# Patient Record
Sex: FEMALE | Race: WHITE | NOT HISPANIC OR LATINO | Employment: OTHER | URBAN - METROPOLITAN AREA
[De-identification: names, ages, dates, MRNs, and addresses within clinical notes are randomized per-mention and may not be internally consistent; named-entity substitution may affect disease eponyms.]

---

## 2017-01-03 ENCOUNTER — OFFICE VISIT (OUTPATIENT)
Dept: INTERNAL MEDICINE | Facility: CLINIC | Age: 62
End: 2017-01-03

## 2017-01-03 VITALS
SYSTOLIC BLOOD PRESSURE: 130 MMHG | WEIGHT: 165.9 LBS | OXYGEN SATURATION: 98 % | HEART RATE: 79 BPM | TEMPERATURE: 98 F | DIASTOLIC BLOOD PRESSURE: 90 MMHG | BODY MASS INDEX: 26.98 KG/M2

## 2017-01-03 DIAGNOSIS — J06.9 URI WITH COUGH AND CONGESTION: Primary | ICD-10-CM

## 2017-01-03 LAB
EXPIRATION DATE: NORMAL
INTERNAL CONTROL: NORMAL
Lab: NORMAL
S PYO AG THROAT QL: NEGATIVE

## 2017-01-03 PROCEDURE — 99213 OFFICE O/P EST LOW 20 MIN: CPT | Performed by: INTERNAL MEDICINE

## 2017-01-03 PROCEDURE — 87880 STREP A ASSAY W/OPTIC: CPT | Performed by: INTERNAL MEDICINE

## 2017-01-03 NOTE — PROGRESS NOTES
Subjective   Kelly Strickland is a 61 y.o. female.   Chief Complaint   Patient presents with   • URI       History of Present Illness   Seen on [id number] by PA and given zpak. Cough, nonproductive. No fever or chills. No nausea or vomiting. No diarrhea. Sore throat x 3 days.  The following portions of the patient's history were reviewed and updated as appropriate: allergies, current medications, past family history, past medical history, past social history, past surgical history and problem list.    Review of Systems   Constitutional: Negative for activity change, appetite change, chills, diaphoresis, fatigue, fever and unexpected weight change.   HENT: Positive for sore throat. Negative for congestion, ear discharge, ear pain, mouth sores, nosebleeds, sinus pressure and sneezing.    Eyes: Negative for pain, discharge and itching.   Respiratory: Positive for cough. Negative for chest tightness, shortness of breath and wheezing.    Cardiovascular: Negative for chest pain, palpitations and leg swelling.   Gastrointestinal: Negative for abdominal pain, constipation, diarrhea, nausea and vomiting.   Endocrine: Negative for cold intolerance, heat intolerance, polydipsia and polyphagia.   Genitourinary: Negative for dysuria, flank pain, frequency, hematuria and urgency.   Musculoskeletal: Negative for arthralgias, back pain, gait problem, myalgias, neck pain and neck stiffness.   Skin: Negative for color change, pallor and rash.   Neurological: Negative for seizures, speech difficulty, numbness and headaches.   Psychiatric/Behavioral: Negative for agitation, confusion, decreased concentration and sleep disturbance. The patient is not nervous/anxious.      Visit Vitals   • /90   • Pulse 79   • Temp 98 °F (36.7 °C)   • Wt 165 lb 14.4 oz (75.3 kg)   • SpO2 98%   • BMI 26.98 kg/m2       Objective   Physical Exam   Constitutional: She is oriented to person, place, and time. She appears well-developed.   HENT:   Head:  Normocephalic.   Right Ear: External ear normal.   Left Ear: External ear normal.   Nose: Nose normal.   Mouth/Throat: Oropharynx is clear and moist.   Eyes: Conjunctivae are normal. Pupils are equal, round, and reactive to light.   Neck: No JVD present. No thyromegaly present.   Cardiovascular: Normal rate, regular rhythm and normal heart sounds.  Exam reveals no friction rub.    No murmur heard.  Pulmonary/Chest: Effort normal and breath sounds normal. No respiratory distress. She has no wheezes. She has no rales.   Abdominal: Soft. Bowel sounds are normal. She exhibits no distension. There is no tenderness. There is no guarding.   Musculoskeletal: She exhibits no edema or tenderness.   Lymphadenopathy:     She has no cervical adenopathy.   Neurological: She is alert and oriented to person, place, and time. She has normal reflexes. She displays normal reflexes. No cranial nerve deficit.   Skin: No rash noted.   Psychiatric: She has a normal mood and affect. Her behavior is normal.   Nursing note and vitals reviewed.      Assessment/Plan   Kelly was seen today for uri.    Diagnoses and all orders for this visit:    URI with cough and congestion  -     POC Rapid Strep A    strep neg. Viral. Can use otc products for relief. Notify if sxs worsen or fever develops.

## 2017-01-03 NOTE — MR AVS SNAPSHOT
Kelly Strickland   1/3/2017 8:45 AM   Office Visit    Dept Phone:  [phone]   Encounter #:  [id number]    Provider:  Mary Antonio DO   Department:  [facility] INTERNAL MEDICINE AND ENDOCRINOLOGY [place]                Your Full Care Plan              Today's Medication Changes          These changes are accurate as of: 1/3/17  9:39 AM.  If you have any questions, ask your nurse or doctor.               Stop taking medication(s)listed here:     azithromycin 250 MG tablet   Commonly known as:  ZITHROMAX Z-MALICK   Stopped by:  Mary Antonio DO                      Your Updated Medication List          This list is accurate as of: 1/3/17  9:39 AM.  Always use your most recent med list.                ibandronate 150 MG tablet   Commonly known as:  BONIVA   Take 1 tablet by mouth every 30 (thirty) days.               We Performed the Following     POC Rapid Strep A       You Were Diagnosed With        Codes Comments    URI with cough and congestion    -  Primary ICD-10-CM: J06.9  ICD-9-CM: 465.9       Instructions     None    Patient Instructions History      Upcoming Appointments     Visit Type Date Time Department    FOLLOW UP 1/3/2017  8:45 AM [facility]    PHYSICAL 2017  9:00 AM [facility]      [employer] Signup     [facility] [employer] allows you to send messages to your doctor, view your test results, renew your prescriptions, schedule appointments, and more. To sign up, go to [employer] and click on the Sign Up Now link in the New User? box. Enter your [employer] Activation Code exactly as it appears below along with the last four digits of your Social Security Number and your Date of Birth () to complete the sign-up process. If you do not sign up before the expiration date, you must request a new code.    [employer] Activation Code: [email or web address]  Expires: 2017  5:37 AM    If you have questions, you can email [email or web address] or  call [phone] to talk to our MyChart staff. Remember, "[employer]hart is NOT to be used for urgent needs. For medical emergencies, dial 911.               Other Info from Your Visit           Your Appointments     Jun 29, 2017  9:00 AM EDT   Physical with Mary Antonio DO   [facility] INTERNAL MEDICINE AND ENDOCRINOLOGY [place] (--)    [address] [address] [phone]           Arrive 15 minutes prior to appointment.              Allergies     Other        Reason for Visit     URI           Vital Signs     Blood Pressure Pulse Temperature Weight Oxygen Saturation Body Mass Index    130/90 79 98 °F (36.7 °C) 165 lb 14.4 oz (75.3 kg) 98% 26.98 kg/m2    Smoking Status                   Never Smoker           Problems and Diagnoses Noted     URI with cough and congestion    -  Primary

## 2017-01-31 ENCOUNTER — OFFICE VISIT (OUTPATIENT)
Dept: INTERNAL MEDICINE | Facility: CLINIC | Age: 62
End: 2017-01-31

## 2017-01-31 VITALS
BODY MASS INDEX: 26.51 KG/M2 | HEART RATE: 81 BPM | OXYGEN SATURATION: 99 % | WEIGHT: 163 LBS | SYSTOLIC BLOOD PRESSURE: 130 MMHG | DIASTOLIC BLOOD PRESSURE: 100 MMHG

## 2017-01-31 DIAGNOSIS — F41.1 ANXIETY AS ACUTE REACTION TO EXCEPTIONAL STRESS: Primary | ICD-10-CM

## 2017-01-31 DIAGNOSIS — Z11.59 NEED FOR HEPATITIS C SCREENING TEST: ICD-10-CM

## 2017-01-31 DIAGNOSIS — F43.0 ANXIETY AS ACUTE REACTION TO EXCEPTIONAL STRESS: Primary | ICD-10-CM

## 2017-01-31 LAB — HCV AB SER DONR QL: NORMAL

## 2017-01-31 PROCEDURE — 86803 HEPATITIS C AB TEST: CPT | Performed by: INTERNAL MEDICINE

## 2017-01-31 PROCEDURE — 36415 COLL VENOUS BLD VENIPUNCTURE: CPT | Performed by: INTERNAL MEDICINE

## 2017-01-31 PROCEDURE — 99213 OFFICE O/P EST LOW 20 MIN: CPT | Performed by: INTERNAL MEDICINE

## 2017-01-31 NOTE — PROGRESS NOTES
Subjective   Kelly Strickland is a 61 y.o. female.   Chief Complaint   Patient presents with   • Stress       History of Present Illness   Has a lot of stress right now. Daughter with her 18 m are living with her. Has not taken meds in past. SXS have been present last 3 months.   The following portions of the patient's history were reviewed and updated as appropriate: allergies, current medications, past family history, past medical history, past social history, past surgical history and problem list.    Review of Systems   Constitutional: Negative for activity change, appetite change, chills, diaphoresis, fatigue, fever and unexpected weight change.   HENT: Negative for congestion, ear discharge, ear pain, mouth sores, nosebleeds, sinus pressure, sneezing and sore throat.    Eyes: Negative for pain, discharge and itching.   Respiratory: Negative for cough, chest tightness, shortness of breath and wheezing.    Cardiovascular: Negative for chest pain, palpitations and leg swelling.   Gastrointestinal: Negative for abdominal pain, constipation, diarrhea, nausea and vomiting.   Endocrine: Negative for cold intolerance, heat intolerance, polydipsia and polyphagia.   Genitourinary: Negative for dysuria, flank pain, frequency, hematuria and urgency.   Musculoskeletal: Negative for arthralgias, back pain, gait problem, myalgias, neck pain and neck stiffness.   Skin: Negative for color change, pallor and rash.   Neurological: Negative for seizures, speech difficulty, numbness and headaches.   Psychiatric/Behavioral: Negative for agitation, confusion, decreased concentration and sleep disturbance. The patient is not nervous/anxious.         Stress     Visit Vitals   • /100   • Pulse 81   • Wt 163 lb (73.9 kg)   • SpO2 99%   • BMI 26.51 kg/m2       Objective   Physical Exam   Constitutional: She is oriented to person, place, and time. She appears well-developed.   HENT:   Head: Normocephalic.   Right Ear: External ear  normal.   Left Ear: External ear normal.   Nose: Nose normal.   Mouth/Throat: Oropharynx is clear and moist.   Eyes: Conjunctivae are normal. Pupils are equal, round, and reactive to light.   Neck: No JVD present. No thyromegaly present.   Cardiovascular: Normal rate, regular rhythm and normal heart sounds.  Exam reveals no friction rub.    No murmur heard.  Pulmonary/Chest: Effort normal and breath sounds normal. No respiratory distress. She has no wheezes. She has no rales.   Abdominal: Soft. Bowel sounds are normal. She exhibits no distension. There is no tenderness. There is no guarding.   Musculoskeletal: She exhibits no edema or tenderness.   Lymphadenopathy:     She has no cervical adenopathy.   Neurological: She is alert and oriented to person, place, and time. She has normal reflexes. She displays normal reflexes. No cranial nerve deficit.   Skin: No rash noted.   Psychiatric: She has a normal mood and affect. Her behavior is normal.   Nursing note and vitals reviewed.      Assessment/Plan   Kelly was seen today for stress.    Diagnoses and all orders for this visit:    Anxiety as acute reaction to exceptional stress  -     sertraline (ZOLOFT) 50 MG tablet; Take 1 tablet by mouth Daily.  New script  Will take 1/2 x 1 week.  Need for hepatitis C screening test  -     Hepatitis C Antibody

## 2017-01-31 NOTE — MR AVS SNAPSHOT
Kelly Strickland   1/31/2017 7:30 AM   Office Visit    Dept Phone:  [phone]   Encounter #:  [id number]    Provider:  Mary Antonio DO   Department:  [facility] INTERNAL MEDICINE AND ENDOCRINOLOGY [place]                Your Full Care Plan              Today's Medication Changes          These changes are accurate as of: 1/31/17  7:56 AM.  If you have any questions, ask your nurse or doctor.               New Medication(s)Ordered:     sertraline 50 MG tablet   Commonly known as:  ZOLOFT   Take 1 tablet by mouth Daily.            Where to Get Your Medications      These medications were sent to [facility]/pharmacy #[id number] - [place], [place] - [address] - [phone]  - [phone] [place] [address]    Hours:  24-hours Phone:  [phone]     sertraline 50 MG tablet                  Your Updated Medication List          This list is accurate as of: 1/31/17  7:56 AM.  Always use your most recent med list.                ibandronate 150 MG tablet   Commonly known as:  BONIVA   Take 1 tablet by mouth every 30 (thirty) days.       sertraline 50 MG tablet   Commonly known as:  ZOLOFT   Take 1 tablet by mouth Daily.               We Performed the Following     Hepatitis C Antibody       You Were Diagnosed With        Codes Comments    Anxiety as acute reaction to exceptional stress    -  Primary ICD-10-CM: F41.1, F43.0  ICD-9-CM: 308.0     Need for hepatitis C screening test     ICD-10-CM: Z11.59  ICD-9-CM: V73.89       Instructions     None    Patient Instructions History      Upcoming Appointments     Visit Type Date Time Department    SAME DAY 1/31/2017  7:30 AM [facility]    FOLLOW UP 2/21/2017  7:30 AM [facility]    PHYSICAL 6/29/2017  9:00 AM [facility]      MyChart Signup     [facility] [employer] [facility] allows you to send messages to your doctor, view your test results, renew your prescriptions, schedule appointments, and more. To sign up, go  to [email or web address] and click on the Sign Up Now link in the New User? box. Enter your [employer] Activation Code exactly as it appears below along with the last four digits of your Social Security Number and your Date of Birth () to complete the sign-up process. If you do not sign up before the expiration date, you must request a new code.    [employer] Activation Code: [email or web address]  Expires: 2017  5:38 AM    If you have questions, you can email [email or web address] or call [phone] to talk to our [employer] staff. Remember, [employer] is NOT to be used for urgent needs. For medical emergencies, dial 911.               Other Info from Your Visit           Your Appointments     2017  7:30 AM EST   Follow Up with Mary Antonio DO   [facility] INTERNAL MEDICINE AND ENDOCRINOLOGY [place] (--)    [address]  [facility] [address]   [phone]           Arrive 15 minutes prior to appointment.            2017  9:00 AM EDT   Physical with Mary Antonio DO   [facility] INTERNAL MEDICINE AND ENDOCRINOLOGY [place] (--)    [address]  [facility] [address]   [phone]           Arrive 15 minutes prior to appointment.              Allergies     Other        Reason for Visit     Stress           Vital Signs     Blood Pressure Pulse Weight Oxygen Saturation Body Mass Index Smoking Status    130/100 81 163 lb (73.9 kg) 99% 26.51 kg/m2 Never Smoker      Problems and Diagnoses Noted     Anxiety as acute reaction to exceptional stress    -  Primary    Need for hepatitis C screening test          Results

## 2017-02-13 ENCOUNTER — TELEPHONE (OUTPATIENT)
Dept: INTERNAL MEDICINE | Facility: CLINIC | Age: 62
End: 2017-02-13

## 2017-02-13 RX ORDER — OSELTAMIVIR PHOSPHATE 75 MG/1
75 CAPSULE ORAL DAILY
Qty: 10 CAPSULE | Refills: 0 | Status: SHIPPED | OUTPATIENT
Start: 2017-02-13 | End: 2017-03-10

## 2017-02-13 NOTE — TELEPHONE ENCOUNTER
----- Message from Mary Antonio DO sent at 2/13/2017  2:43 PM EST -----  Contact: PATIENT   tamiflu 75 mg once a day x 10 days (let her know this is prophylaxis dose)  ----- Message -----     From: Tran LIVINGSTON MA     Sent: 2/13/2017   1:44 PM       To: Mary Antonio DO    Pls advise.     ----- Message -----     From: Charo Jain     Sent: 2/13/2017   8:50 AM       To: Myrna Voss MA    RE: TAMIFLU    MS CUEVAS STATES THAT BOTH HER DAUGHTER AND GRANDDAUGHTER HAVE TESTED POSITIVE FOR THE FLU. MS CUEVAS WOULD LIKE TO KNOW IF IT WOULD BE POSSIBLE FOR DR ANTONIO TO WRITE AN ORDER FOR TAMIFLU FOR HER AS A PREVENTATIVE. SHE HAS NO SYMPTOMS AT THIS TIME BUT IS CARING FOR THE FAMILY MEMBERS THAT ARE CURRENTLY ILL.    [facility] ON [address]    CALL BACK #[phone]

## 2017-02-15 PROBLEM — R09.89 CAROTID BRUIT: Status: ACTIVE | Noted: 2017-02-15

## 2017-02-21 ENCOUNTER — OFFICE VISIT (OUTPATIENT)
Dept: INTERNAL MEDICINE | Facility: CLINIC | Age: 62
End: 2017-02-21

## 2017-02-21 VITALS
SYSTOLIC BLOOD PRESSURE: 110 MMHG | WEIGHT: 159.2 LBS | BODY MASS INDEX: 25.89 KG/M2 | HEART RATE: 77 BPM | OXYGEN SATURATION: 99 % | DIASTOLIC BLOOD PRESSURE: 80 MMHG

## 2017-02-21 DIAGNOSIS — F41.9 ANXIETY: Primary | ICD-10-CM

## 2017-02-21 PROCEDURE — 99213 OFFICE O/P EST LOW 20 MIN: CPT | Performed by: INTERNAL MEDICINE

## 2017-02-21 NOTE — PROGRESS NOTES
Subjective   Kelly Strickland is a 62 y.o. female.   Chief Complaint   Patient presents with   • Anxiety       History of Present Illness   F/u on recent diagnosis of anxiety. Started on Zoloft and seems to be working. Takes it at night because it makes her sleepy.  The following portions of the patient's history were reviewed and updated as appropriate: allergies, current medications, past family history, past medical history, past social history, past surgical history and problem list.    Review of Systems   Constitutional: Negative for activity change, appetite change, chills, diaphoresis, fatigue, fever and unexpected weight change.   HENT: Negative for congestion, ear discharge, ear pain, mouth sores, nosebleeds, sinus pressure, sneezing and sore throat.    Eyes: Negative for pain, discharge and itching.   Respiratory: Negative for cough, chest tightness, shortness of breath and wheezing.    Cardiovascular: Negative for chest pain, palpitations and leg swelling.   Gastrointestinal: Negative for abdominal pain, constipation, diarrhea, nausea and vomiting.   Endocrine: Negative for cold intolerance, heat intolerance, polydipsia and polyphagia.   Genitourinary: Negative for dysuria, flank pain, frequency, hematuria and urgency.   Musculoskeletal: Negative for arthralgias, back pain, gait problem, myalgias, neck pain and neck stiffness.   Skin: Negative for color change, pallor and rash.   Neurological: Negative for seizures, speech difficulty, numbness and headaches.   Psychiatric/Behavioral: Negative for agitation, confusion, decreased concentration and sleep disturbance. The patient is not nervous/anxious.      Visit Vitals   • /80   • Pulse 77   • Wt 159 lb 3.2 oz (72.2 kg)   • SpO2 99%   • BMI 25.89 kg/m2       Objective   Physical Exam   Constitutional: She is oriented to person, place, and time. She appears well-developed.   HENT:   Head: Normocephalic.   Right Ear: External ear normal.   Left Ear:  External ear normal.   Nose: Nose normal.   Mouth/Throat: Oropharynx is clear and moist.   Eyes: Conjunctivae are normal. Pupils are equal, round, and reactive to light.   Neck: No JVD present. No thyromegaly present.   Cardiovascular: Normal rate, regular rhythm and normal heart sounds.  Exam reveals no friction rub.    No murmur heard.  Pulmonary/Chest: Effort normal and breath sounds normal. No respiratory distress. She has no wheezes. She has no rales.   Abdominal: Soft. Bowel sounds are normal. She exhibits no distension. There is no tenderness. There is no guarding.   Musculoskeletal: She exhibits no edema or tenderness.   Lymphadenopathy:     She has no cervical adenopathy.   Neurological: She is alert and oriented to person, place, and time. She has normal reflexes. She displays normal reflexes. No cranial nerve deficit.   Skin: No rash noted.   Psychiatric: She has a normal mood and affect. Her behavior is normal.   Nursing note and vitals reviewed.      Assessment/Plan   Kelly was seen today for anxiety.    Diagnoses and all orders for this visit:    Anxiety  Zoloft is helping. Continue current dose.

## 2017-03-10 ENCOUNTER — APPOINTMENT (OUTPATIENT)
Dept: GENERAL RADIOLOGY | Facility: HOSPITAL | Age: 62
End: 2017-03-10

## 2017-03-10 ENCOUNTER — HOSPITAL ENCOUNTER (OUTPATIENT)
Facility: HOSPITAL | Age: 62
Setting detail: OBSERVATION
LOS: 1 days | Discharge: HOME | End: 2017-03-14
Attending: EMERGENCY MEDICINE | Admitting: INTERNAL MEDICINE

## 2017-03-10 ENCOUNTER — HOSPITAL ENCOUNTER (OUTPATIENT)
Facility: HOSPITAL | Age: 62
Setting detail: SURGERY ADMIT
End: 2017-03-10
Attending: ORTHOPAEDIC SURGERY | Admitting: ORTHOPAEDIC SURGERY

## 2017-03-10 DIAGNOSIS — Z74.09 IMPAIRED MOBILITY AND ADLS: ICD-10-CM

## 2017-03-10 DIAGNOSIS — Z74.09 IMPAIRED FUNCTIONAL MOBILITY, BALANCE, GAIT, AND ENDURANCE: ICD-10-CM

## 2017-03-10 DIAGNOSIS — S42.291A OTHER CLOSED DISPLACED FRACTURE OF PROXIMAL END OF RIGHT HUMERUS, INITIAL ENCOUNTER: Primary | ICD-10-CM

## 2017-03-10 DIAGNOSIS — Z78.9 IMPAIRED MOBILITY AND ADLS: ICD-10-CM

## 2017-03-10 PROBLEM — M85.80 OSTEOPENIA: Status: ACTIVE | Noted: 2017-03-10

## 2017-03-10 PROBLEM — K21.9 GERD (GASTROESOPHAGEAL REFLUX DISEASE): Status: ACTIVE | Noted: 2017-03-10

## 2017-03-10 PROBLEM — S42.309A CLOSED FRACTURE OF HUMERUS: Status: ACTIVE | Noted: 2017-03-10

## 2017-03-10 LAB
ANION GAP SERPL CALCULATED.3IONS-SCNC: 2 MMOL/L (ref 3–11)
BASOPHILS # BLD AUTO: 0.01 10*3/MM3 (ref 0–0.2)
BASOPHILS NFR BLD AUTO: 0.1 % (ref 0–1)
BUN BLD-MCNC: 12 MG/DL (ref 9–23)
BUN/CREAT SERPL: 17.1 (ref 7–25)
CALCIUM SPEC-SCNC: 9 MG/DL (ref 8.7–10.4)
CHLORIDE SERPL-SCNC: 110 MMOL/L (ref 99–109)
CO2 SERPL-SCNC: 29 MMOL/L (ref 20–31)
CREAT BLD-MCNC: 0.7 MG/DL (ref 0.6–1.3)
DEPRECATED RDW RBC AUTO: 44.9 FL (ref 37–54)
EOSINOPHIL # BLD AUTO: 0.06 10*3/MM3 (ref 0.1–0.3)
EOSINOPHIL NFR BLD AUTO: 0.5 % (ref 0–3)
ERYTHROCYTE [DISTWIDTH] IN BLOOD BY AUTOMATED COUNT: 13.1 % (ref 11.3–14.5)
GFR SERPL CREATININE-BSD FRML MDRD: 85 ML/MIN/1.73
GLUCOSE BLD-MCNC: 103 MG/DL (ref 70–100)
HCT VFR BLD AUTO: 42.1 % (ref 34.5–44)
HGB BLD-MCNC: 14 G/DL (ref 11.5–15.5)
HOLD SPECIMEN: NORMAL
HOLD SPECIMEN: NORMAL
IMM GRANULOCYTES # BLD: 0.04 10*3/MM3 (ref 0–0.03)
IMM GRANULOCYTES NFR BLD: 0.3 % (ref 0–0.6)
LYMPHOCYTES # BLD AUTO: 1.07 10*3/MM3 (ref 0.6–4.8)
LYMPHOCYTES NFR BLD AUTO: 8.8 % (ref 24–44)
MCH RBC QN AUTO: 31 PG (ref 27–31)
MCHC RBC AUTO-ENTMCNC: 33.3 G/DL (ref 32–36)
MCV RBC AUTO: 93.1 FL (ref 80–99)
MONOCYTES # BLD AUTO: 0.59 10*3/MM3 (ref 0–1)
MONOCYTES NFR BLD AUTO: 4.8 % (ref 0–12)
NEUTROPHILS # BLD AUTO: 10.42 10*3/MM3 (ref 1.5–8.3)
NEUTROPHILS NFR BLD AUTO: 85.5 % (ref 41–71)
PLATELET # BLD AUTO: 276 10*3/MM3 (ref 150–450)
PMV BLD AUTO: 9.5 FL (ref 6–12)
POTASSIUM BLD-SCNC: 4.3 MMOL/L (ref 3.5–5.5)
RBC # BLD AUTO: 4.52 10*6/MM3 (ref 3.89–5.14)
SODIUM BLD-SCNC: 141 MMOL/L (ref 132–146)
WBC NRBC COR # BLD: 12.19 10*3/MM3 (ref 3.5–10.8)
WHOLE BLOOD HOLD SPECIMEN: NORMAL
WHOLE BLOOD HOLD SPECIMEN: NORMAL

## 2017-03-10 PROCEDURE — 25010000002 HYDROMORPHONE PER 4 MG: Performed by: EMERGENCY MEDICINE

## 2017-03-10 PROCEDURE — 99284 EMERGENCY DEPT VISIT MOD MDM: CPT

## 2017-03-10 PROCEDURE — 25010000002 LORAZEPAM PER 2 MG: Performed by: EMERGENCY MEDICINE

## 2017-03-10 PROCEDURE — 93005 ELECTROCARDIOGRAM TRACING: CPT | Performed by: PHYSICIAN ASSISTANT

## 2017-03-10 PROCEDURE — 25010000002 MORPHINE PER 10 MG: Performed by: EMERGENCY MEDICINE

## 2017-03-10 PROCEDURE — 85025 COMPLETE CBC W/AUTO DIFF WBC: CPT | Performed by: PHYSICIAN ASSISTANT

## 2017-03-10 PROCEDURE — 96376 TX/PRO/DX INJ SAME DRUG ADON: CPT

## 2017-03-10 PROCEDURE — 73060 X-RAY EXAM OF HUMERUS: CPT

## 2017-03-10 PROCEDURE — 80048 BASIC METABOLIC PNL TOTAL CA: CPT | Performed by: PHYSICIAN ASSISTANT

## 2017-03-10 PROCEDURE — 25010000002 ONDANSETRON PER 1 MG: Performed by: EMERGENCY MEDICINE

## 2017-03-10 PROCEDURE — 25010000002 HYDROMORPHONE PER 4 MG

## 2017-03-10 PROCEDURE — 71010 HC CHEST PA OR AP: CPT

## 2017-03-10 PROCEDURE — G0378 HOSPITAL OBSERVATION PER HR: HCPCS

## 2017-03-10 PROCEDURE — 25010000002 HYDROMORPHONE PER 4 MG: Performed by: INTERNAL MEDICINE

## 2017-03-10 RX ORDER — LORAZEPAM 2 MG/ML
0.5 INJECTION INTRAMUSCULAR ONCE
Status: COMPLETED | OUTPATIENT
Start: 2017-03-10 | End: 2017-03-10

## 2017-03-10 RX ORDER — SODIUM CHLORIDE 0.9 % (FLUSH) 0.9 %
1-10 SYRINGE (ML) INJECTION AS NEEDED
Status: DISCONTINUED | OUTPATIENT
Start: 2017-03-10 | End: 2017-03-14 | Stop reason: HOSPADM

## 2017-03-10 RX ORDER — HYDROMORPHONE HYDROCHLORIDE 1 MG/ML
0.5 INJECTION, SOLUTION INTRAMUSCULAR; INTRAVENOUS; SUBCUTANEOUS ONCE
Status: COMPLETED | OUTPATIENT
Start: 2017-03-10 | End: 2017-03-10

## 2017-03-10 RX ORDER — MORPHINE SULFATE 4 MG/ML
4 INJECTION, SOLUTION INTRAMUSCULAR; INTRAVENOUS ONCE
Status: COMPLETED | OUTPATIENT
Start: 2017-03-10 | End: 2017-03-10

## 2017-03-10 RX ORDER — ONDANSETRON 2 MG/ML
4 INJECTION INTRAMUSCULAR; INTRAVENOUS ONCE
Status: COMPLETED | OUTPATIENT
Start: 2017-03-10 | End: 2017-03-10

## 2017-03-10 RX ORDER — HYDROMORPHONE HYDROCHLORIDE 1 MG/ML
0.5 INJECTION, SOLUTION INTRAMUSCULAR; INTRAVENOUS; SUBCUTANEOUS
Status: DISCONTINUED | OUTPATIENT
Start: 2017-03-10 | End: 2017-03-14

## 2017-03-10 RX ORDER — HYDROCODONE BITARTRATE AND ACETAMINOPHEN 5; 325 MG/1; MG/1
1 TABLET ORAL EVERY 4 HOURS PRN
Status: DISCONTINUED | OUTPATIENT
Start: 2017-03-10 | End: 2017-03-14 | Stop reason: HOSPADM

## 2017-03-10 RX ORDER — ONDANSETRON 2 MG/ML
4 INJECTION INTRAMUSCULAR; INTRAVENOUS EVERY 6 HOURS PRN
Status: DISCONTINUED | OUTPATIENT
Start: 2017-03-10 | End: 2017-03-14 | Stop reason: HOSPADM

## 2017-03-10 RX ORDER — HYDRALAZINE HYDROCHLORIDE 20 MG/ML
10 INJECTION INTRAMUSCULAR; INTRAVENOUS EVERY 6 HOURS PRN
Status: DISCONTINUED | OUTPATIENT
Start: 2017-03-10 | End: 2017-03-14 | Stop reason: HOSPADM

## 2017-03-10 RX ORDER — IBUPROFEN 200 MG
200 TABLET ORAL EVERY 6 HOURS PRN
COMMUNITY
End: 2017-06-29

## 2017-03-10 RX ORDER — DOCUSATE SODIUM 100 MG/1
100 CAPSULE, LIQUID FILLED ORAL 2 TIMES DAILY
Status: DISCONTINUED | OUTPATIENT
Start: 2017-03-10 | End: 2017-03-14 | Stop reason: HOSPADM

## 2017-03-10 RX ADMIN — LORAZEPAM 0.5 MG: 2 INJECTION INTRAMUSCULAR; INTRAVENOUS at 11:45

## 2017-03-10 RX ADMIN — HYDROMORPHONE HYDROCHLORIDE 1 MG: 1 INJECTION, SOLUTION INTRAMUSCULAR; INTRAVENOUS; SUBCUTANEOUS at 08:37

## 2017-03-10 RX ADMIN — DOCUSATE SODIUM 100 MG: 100 CAPSULE, LIQUID FILLED ORAL at 17:27

## 2017-03-10 RX ADMIN — LORAZEPAM 0.5 MG: 2 INJECTION INTRAMUSCULAR; INTRAVENOUS at 10:21

## 2017-03-10 RX ADMIN — MORPHINE SULFATE 4 MG: 4 INJECTION, SOLUTION INTRAMUSCULAR; INTRAVENOUS at 08:20

## 2017-03-10 RX ADMIN — HYDROMORPHONE HYDROCHLORIDE 0.5 MG: 1 INJECTION, SOLUTION INTRAMUSCULAR; INTRAVENOUS; SUBCUTANEOUS at 11:44

## 2017-03-10 RX ADMIN — HYDROMORPHONE HYDROCHLORIDE 0.5 MG: 1 INJECTION, SOLUTION INTRAMUSCULAR; INTRAVENOUS; SUBCUTANEOUS at 19:27

## 2017-03-10 RX ADMIN — HYDROMORPHONE HYDROCHLORIDE 0.5 MG: 1 INJECTION, SOLUTION INTRAMUSCULAR; INTRAVENOUS; SUBCUTANEOUS at 22:02

## 2017-03-10 RX ADMIN — ONDANSETRON 4 MG: 2 INJECTION INTRAMUSCULAR; INTRAVENOUS at 10:22

## 2017-03-10 RX ADMIN — Medication 1 MG: at 08:37

## 2017-03-10 RX ADMIN — SERTRALINE HYDROCHLORIDE 25 MG: 50 TABLET ORAL at 20:10

## 2017-03-10 RX ADMIN — HYDROMORPHONE HYDROCHLORIDE 0.5 MG: 1 INJECTION, SOLUTION INTRAMUSCULAR; INTRAVENOUS; SUBCUTANEOUS at 10:21

## 2017-03-10 RX ADMIN — HYDROCODONE BITARTRATE AND ACETAMINOPHEN 1 TABLET: 5; 325 TABLET ORAL at 15:53

## 2017-03-10 RX ADMIN — HYDROMORPHONE HYDROCHLORIDE 0.5 MG: 1 INJECTION, SOLUTION INTRAMUSCULAR; INTRAVENOUS; SUBCUTANEOUS at 16:47

## 2017-03-10 NOTE — PLAN OF CARE
Problem: Fall Risk (Adult)  Goal: Identify Related Risk Factors and Signs and Symptoms  Outcome: Ongoing (interventions implemented as appropriate)    03/10/17 1642   Fall Risk   Fall Risk: Related Risk Factors history of falls   Fall Risk: Signs and Symptoms presence of risk factors       Goal: Absence of Falls  Outcome: Ongoing (interventions implemented as appropriate)    03/10/17 1642   Fall Risk (Adult)   Absence of Falls making progress toward outcome         Problem: Pain, Acute (Adult)  Goal: Identify Related Risk Factors and Signs and Symptoms  Outcome: Ongoing (interventions implemented as appropriate)    03/10/17 1642   Pain, Acute   Related Risk Factors (Acute Pain) fear;patient perception;persistent pain   Signs and Symptoms (Acute Pain) alteration in muscle tone;verbalization of pain descriptors;facial mask of pain/grimace       Goal: Acceptable Pain Control/Comfort Level  Outcome: Ongoing (interventions implemented as appropriate)    03/10/17 1642   Pain, Acute (Adult)   Acceptable Pain Control/Comfort Level making progress toward outcome

## 2017-03-10 NOTE — ED PROVIDER NOTES
Subjective   Patient is a 62 y.o. female presenting with fall.   History provided by:  Patient  Fall   Mechanism of injury: fall    Injury location:  Shoulder/arm  Shoulder/arm injury location:  R upper arm  Incident location:  Home  Time since incident: [facility].  Fall:     Fall occurred: Walking down stairs, tripped on last step while carrying a child in left arm and landed on rigth arm.   Prior to arrival data:     Medications administered:  Fentanyl (Zofran )    Immobilization:  RUE splint (by EMS)  Associated symptoms: no abdominal pain, no back pain, no chest pain, no difficulty breathing, no headaches, no loss of consciousness, no neck pain and no vomiting    Risk factors: no anticoagulation therapy    Denies prior hx of fractures. Obvious deformity to right upper arm.   Patient has not eaten today, last intake last night.    Review of Systems   Respiratory: Negative for shortness of breath.    Cardiovascular: Negative for chest pain.   Gastrointestinal: Negative for abdominal pain and vomiting.   Musculoskeletal: Positive for arthralgias (Right upper arm ). Negative for back pain and neck pain.   Neurological: Negative for dizziness, loss of consciousness, weakness and headaches.   All other systems reviewed and are negative.      Past Medical History   Diagnosis Date   • Acid reflux    • Basal cell carcinoma of skin    • Herpes zoster    • Migraine    • Nephrolithiasis    • Osteopenia    • Thoracic vertebral fracture    • Vitamin D deficiency        Allergies   Allergen Reactions   • Other        Past Surgical History   Procedure Laterality Date   • Cholecystectomy     • Mohs surgery     • Cystoscopy w/ laser lithotripsy     • Breast biopsy         Family History   Problem Relation Age of Onset   • Stroke Mother    • Hypertension Mother    • Osteoporosis Mother    • Kidney cancer Father    • Prostate cancer Father    • Diabetes type II Father    • Breast cancer Sister    • Osteoporosis Maternal Aunt    • Breast  cancer Maternal Aunt    • Colon cancer Maternal Aunt        Social History     Social History   • Marital status:      Spouse name: N/A   • Number of children: N/A   • Years of education: N/A     Social History Main Topics   • Smoking status: Never Smoker   • Smokeless tobacco: None   • Alcohol use Yes      Comment: occasionally   • Drug use: No   • Sexual activity: Not Asked     Other Topics Concern   • None     Social History Narrative           Objective   Physical Exam   Constitutional: She appears well-developed and well-nourished. No distress.   HENT:   Head: Atraumatic.   Eyes: Conjunctivae are normal.   Neck: Normal range of motion. Neck supple.   Cardiovascular: Normal rate, regular rhythm and intact distal pulses.    Pulses:       Carotid pulses are 2+ on the right side, and 2+ on the left side.  Pulmonary/Chest: Effort normal and breath sounds normal.   Musculoskeletal:        Right elbow: No tenderness found.        Right wrist: She exhibits no tenderness.        Cervical back: She exhibits no tenderness.        Right upper arm: She exhibits tenderness, swelling and deformity.        Right forearm: She exhibits no tenderness.   Patient able to move all fingers and denies any numbness or tingling to right hand.   Skin intact at area of deformity.    Skin: Skin is warm.   Psychiatric: Her mood appears anxious.   Nursing note and vitals reviewed.      Splint Application  Date/Time: 3/10/2017 5:51 PM  Performed by: STEWART ROSALES  Authorized by: DANNA LIMA   Consent: Verbal consent obtained.  Risks and benefits: risks, benefits and alternatives were discussed  Consent given by: patient  Location details: right arm  Splint type: long arm  Supplies used: Ortho-Glass and cotton padding  Post-procedure: The splinted body part was neurovascularly unchanged following the procedure.  Patient tolerance: Patient tolerated the procedure well with no immediate complications               ED Course  ED  Course   Comment By Time   Contacted 's office. Staff relayed Dr. Dias will contact ED regarding treatment.  PATRICIA Toribio 03/10 0935      Spoke with Dr. Dias multiple times. He instructed to place patient in posterior right long arm and sling. Admit to Dr. DURAN and he will see in consult. Surgery likely planned for Monday for open internal fixation.     Re-examined patient multiple times in ED and discussed treatment plan / admission. Pt is agreeable with plan.     Discussed patient with Dr. Castro.     Recent Results (from the past 24 hour(s))   Basic Metabolic Panel    Collection Time: 03/10/17  9:55 AM   Result Value Ref Range    Glucose 103 (H) 70 - 100 mg/dL    BUN 12 9 - 23 mg/dL    Creatinine 0.70 0.60 - 1.30 mg/dL    Sodium 141 132 - 146 mmol/L    Potassium 4.3 3.5 - 5.5 mmol/L    Chloride 110 (H) 99 - 109 mmol/L    CO2 29.0 20.0 - 31.0 mmol/L    Calcium 9.0 8.7 - 10.4 mg/dL    eGFR Non African Amer 85 >60 mL/min/1.73    BUN/Creatinine Ratio 17.1 7.0 - 25.0    Anion Gap 2.0 (L) 3.0 - 11.0 mmol/L   Green Top (Gel)    Collection Time: 03/10/17  9:55 AM   Result Value Ref Range    Extra Tube Hold for add-ons.    Lavender Top    Collection Time: 03/10/17  9:55 AM   Result Value Ref Range    Extra Tube hold for add-on    CBC Auto Differential    Collection Time: 03/10/17  9:55 AM   Result Value Ref Range    WBC 12.19 (H) 3.50 - 10.80 10*3/mm3    RBC 4.52 3.89 - 5.14 10*6/mm3    Hemoglobin 14.0 11.5 - 15.5 g/dL    Hematocrit 42.1 34.5 - 44.0 %    MCV 93.1 80.0 - 99.0 fL    MCH 31.0 27.0 - 31.0 pg    MCHC 33.3 32.0 - 36.0 g/dL    RDW 13.1 11.3 - 14.5 %    RDW-SD 44.9 37.0 - 54.0 fl    MPV 9.5 6.0 - 12.0 fL    Platelets 276 150 - 450 10*3/mm3    Neutrophil % 85.5 (H) 41.0 - 71.0 %    Lymphocyte % 8.8 (L) 24.0 - 44.0 %    Monocyte % 4.8 0.0 - 12.0 %    Eosinophil % 0.5 0.0 - 3.0 %    Basophil % 0.1 0.0 - 1.0 %    Immature Grans % 0.3 0.0 - 0.6 %    Neutrophils, Absolute 10.42 (H) 1.50 - 8.30 10*3/mm3     "Lymphocytes, Absolute 1.07 0.60 - 4.80 10*3/mm3    Monocytes, Absolute 0.59 0.00 - 1.00 10*3/mm3    Eosinophils, Absolute 0.06 (L) 0.10 - 0.30 10*3/mm3    Basophils, Absolute 0.01 0.00 - 0.20 10*3/mm3    Immature Grans, Absolute 0.04 (H) 0.00 - 0.03 10*3/mm3   Light Blue Top    Collection Time: 03/10/17 10:19 AM   Result Value Ref Range    Extra Tube hold for add-on    Gold Top - SST    Collection Time: 03/10/17 10:19 AM   Result Value Ref Range    Extra Tube Hold for add-ons.      Note: In addition to lab results from this visit, the labs listed above may include labs taken at another facility or during a different encounter within the last 24 hours. Please correlate lab times with ED admission and discharge times for further clarification of the services performed during this visit.    XR Chest 1 View   Final Result   No acute chest pathology.       D:  03/10/2017   E:  03/10/2017       This report was finalized on 3/10/2017 4:04 PM by Dr. Pb Singletary MD.          XR Humerus Right   Final Result   Fracture of the right humerus with overlapping of fracture   fragments.       D:  03/10/2017   E:  03/10/2017       This report was finalized on 3/10/2017 3:36 PM by Dr. Pb Singletary MD.            Vitals:    03/10/17 0812 03/10/17 0900 03/10/17 1000 03/10/17 1226   BP: 169/96 149/86 132/89 137/62   BP Location: Left arm   Left arm   Patient Position: Sitting   Lying   Pulse: 93 73 83 92   Resp: 18   16   Temp: 98.1 °F (36.7 °C)   97.3 °F (36.3 °C)   TempSrc: Oral   Tympanic   SpO2: 97% 96% 92%    Weight: 158 lb (71.7 kg)      Height: 65\" (165.1 cm)        Medications   pneumococcal polysaccharide 23-valent (PNEUMOVAX-23) vaccine 0.5 mL (not administered)   sodium chloride 0.9 % flush 1-10 mL (not administered)   hydrALAZINE (APRESOLINE) injection 10 mg (not administered)   ondansetron (ZOFRAN) injection 4 mg (not administered)   sodium chloride 0.9 % bolus 500 mL (not administered)   HYDROcodone-acetaminophen (NORCO) " 5-325 MG per tablet 1 tablet (1 tablet Oral Given 3/10/17 1553)   HYDROmorphone (DILAUDID) injection 0.5 mg (0.5 mg Intravenous Given 3/10/17 1647)   sertraline (ZOLOFT) tablet 25 mg (not administered)   docusate sodium (COLACE) capsule 100 mg (100 mg Oral Given 3/10/17 1727)   Morphine sulfate (PF) injection 4 mg (4 mg Intravenous Given 3/10/17 0820)   HYDROmorphone (DILAUDID) injection 1 mg ( Intravenous Canceled Entry 3/10/17 0839)   LORazepam (ATIVAN) injection 0.5 mg (0.5 mg Intravenous Given 3/10/17 1021)   HYDROmorphone (DILAUDID) injection 0.5 mg (0.5 mg Intravenous Given 3/10/17 1021)   ondansetron (ZOFRAN) injection 4 mg (4 mg Intravenous Given 3/10/17 1022)   HYDROmorphone (DILAUDID) injection 0.5 mg (0.5 mg Intravenous Given 3/10/17 1144)   LORazepam (ATIVAN) injection 0.5 mg (0.5 mg Intravenous Given 3/10/17 1145)     ECG/EMG Results (last 24 hours)     Procedure Component Value Units Date/Time    ECG 12 Lead [[id number]] Collected:  03/10/17 1037     Updated:  03/10/17 1044    Narrative:       Test Reason : Pre op  Blood Pressure : **/** mmHG  Vent. Rate : 071 BPM     Atrial Rate : 071 BPM     P-R Int : 134 ms          QRS Dur : 086 ms      QT Int : 380 ms       P-R-T Axes : 026 004 -07 degrees     QTc Int : 412 ms    Normal sinus rhythm  Nonspecific T wave abnormality  Abnormal ECG  No previous ECGs available  Confirmed by JENNIFER PERSON MD ([id number]) on 3/10/2017 10:44:42 AM    Referred By:  RAZA           Confirmed By:JENNIFER PERSON MD                    [facility]    Final diagnoses:   Other closed displaced fracture of proximal end of right humerus, initial encounter            PATRICIA Toribio  03/10/17 1065

## 2017-03-10 NOTE — CONSULTS
Patient: Kelly Strickland    Date of Admission: 3/10/2017  8:16 AM    YOB: 1955    Medical Record Number: [id number]    Attending Physician: Percy Rhodes MD     Primary Care Physician:  Dr. Mary Antonio    Consulting Physician: Cameron Dias MD      Chief Complaints: Right arm pain    History of Present Illness: Kelly is a pleasant 63yo RHD female admitted to [facility] with a right humerus fracture.  She was carrying her granddaughter in her left arm and missed the last step.  She fell onto her right side.  She experienced immediate pain and deformity to the right arm.  She presented to [facility] ER where x-rays revealed a displaced humerus fracture.  I was contacted for definitive management of the fracture and she was admitted to Dr. DURAN for medical management.  Upon my evaluation of the patient and her hospital room she is comfortable in a long arm splint from the ER.  She has an isolated complaint of right arm pain.  Denies any numbness or tingling.  She takes Boniva for osteopenia.         Allergies   Allergen Reactions   • Other         Home Medications:  Prescriptions Prior to Admission   Medication Sig Dispense Refill Last Dose   • ibandronate (BONIVA) 150 MG tablet Take 1 tablet by mouth every 30 (thirty) days. 12 tablet 0 Past Month at Unknown time   • ibuprofen (ADVIL,MOTRIN) 200 MG tablet Take 200 mg by mouth Every 6 (Six) Hours As Needed for Mild Pain (1-3).      • sertraline (ZOLOFT) 50 MG tablet Take 1 tablet by mouth Daily. (Patient taking differently: Take 25 mg by mouth Every Night.) 30 tablet 1 Taking       Current Medications:  Scheduled Meds:  docusate sodium 100 mg Oral BID   sertraline 25 mg Oral Nightly     Continuous Infusions:   PRN Meds:.hydrALAZINE  •  HYDROcodone-acetaminophen  •  HYDROmorphone  •  ondansetron  •  pneumococcal polysaccharide 23-valent  •  sodium chloride  •  sodium chloride    Past Medical History   Diagnosis Date   • Acid reflux     • Basal cell carcinoma of skin    • Herpes zoster    • Migraine    • Nephrolithiasis    • Osteopenia    • Thoracic vertebral fracture    • Vitamin D deficiency         Past Surgical History   Procedure Laterality Date   • Cholecystectomy     • Mohs surgery     • Cystoscopy w/ laser lithotripsy     • Breast biopsy          Social History     Occupational History   • Not on file.     Social History Main Topics   • Smoking status: Never Smoker   • Smokeless tobacco: Not on file   • Alcohol use Yes      Comment: occasionally   • Drug use: No   • Sexual activity: Not on file    Social History     Social History Narrative        Family History   Problem Relation Age of Onset   • Stroke Mother    • Hypertension Mother    • Osteoporosis Mother    • Kidney cancer Father    • Prostate cancer Father    • Diabetes type II Father    • Breast cancer Sister    • Osteoporosis Maternal Aunt    • Breast cancer Maternal Aunt    • Colon cancer Maternal Aunt          Review of Systems:   HEENT: Patient denies any headaches, vision changes, change in hearing, or tinnitus, Patient denies any rhinorrhea,epistaxis, sinus pain, mouth or dental problems, sore throat or hoarseness, or dysphagia  Pulmonary: Patient denies any cough, congestion, SOA, or wheezing  Cardiovascular: Patient denies any chest pain, dyspnea, palpitations, weakness, intolerance of exercise, varicosities, swelling of extremities, known murmur  Gastrointestinal:  Patient denies nausea, vomiting, diarrhea, constipation, loss  of appetite, change in appetite, dysphagia, gas, heartburn, melena, change in bowel habits, use of laxatives or other drugs to alter the function of the gastrointestinal tract.  Genital/Urinary: Patient denies dysuria, change in color of urine, change in frequency of urination, pain with urgency, incontinence, retention, or nocturia.  Musculoskeletal: Right arm pain  Neurological: Patient denies dizziness, tremor, ataxia, difficulty in speaking,  "change in speech, paresthesia, loss of sensation, seizures, syncope, changes in memory.  Endocrine system: Patient denies tremors, palpitations, intolerance of heat or cold, polyuria, polydipsia, polyphagia, diaphoresis, exophthalmos, or goiter.  Psychological: Patient denies thoughts/plans or harming self or other; depression,  insomnia, night terrors, mindy, memory loss, disorientation.  Skin: Patient denies any bruising, rashes, discoloration, pruritus, wounds, ulcers, decubiti, changes in the hair or nails  Hematopoietic: Patient denies history of spontaneous or excessive bleeding, epistaxis, hematuria, melena, fatigue, enlarged or tender lymph nodes, pallor, history of anemia.    Physical Exam: 62 y.o. female  General Appearance:    Alert, cooperative, in no acute distress                 Vitals:    03/10/17 0812 03/10/17 0900 03/10/17 1000 03/10/17 1226   BP: 169/96 149/86 132/89 137/62   BP Location: Left arm   Left arm   Patient Position: Sitting   Lying   Pulse: 93 73 83 92   Resp: 18   16   Temp: 98.1 °F (36.7 °C)   97.3 °F (36.3 °C)   TempSrc: Oral   Tympanic   SpO2: 97% 96% 92%    Weight: 158 lb (71.7 kg)      Height: 65\" (165.1 cm)           Extremities:  Right upper extremity is in a long arm posterior splint from the ER.  By report skin was intact  Neurovascularly intact distally with positive thumb extension, finger abduction, finger flexion and thumb adduction sensation intact to light touch   2+ radial pulse and brisk capillary refill        Diagnostic Tests:    Admission on 03/10/2017   Component Date Value Ref Range Status   • Glucose 03/10/2017 103* 70 - 100 mg/dL Final   • BUN 03/10/2017 12  9 - 23 mg/dL Final   • Creatinine 03/10/2017 0.70  0.60 - 1.30 mg/dL Final   • Sodium 03/10/2017 141  132 - 146 mmol/L Final   • Potassium 03/10/2017 4.3  3.5 - 5.5 mmol/L Final   • Chloride 03/10/2017 110* 99 - 109 mmol/L Final   • CO2 03/10/2017 29.0  20.0 - 31.0 mmol/L Final   • Calcium 03/10/2017 9.0  " 8.7 - 10.4 mg/dL Final   • eGFR Non African Amer 03/10/2017 85  >60 mL/min/1.73 Final   • BUN/Creatinine Ratio 03/10/2017 17.1  7.0 - 25.0 Final   • Anion Gap 03/10/2017 2.0* 3.0 - 11.0 mmol/L Final   • Extra Tube 03/10/2017 hold for add-on   Final    Auto resulted   • Extra Tube 03/10/2017 Hold for add-ons.   Final    Auto resulted.   • Extra Tube 03/10/2017 hold for add-on   Final    Auto resulted   • Extra Tube 03/10/2017 Hold for add-ons.   Final    Auto resulted.   • WBC 03/10/2017 12.19* 3.50 - 10.80 10*3/mm3 Final   • RBC 03/10/2017 4.52  3.89 - 5.14 10*6/mm3 Final   • Hemoglobin 03/10/2017 14.0  11.5 - 15.5 g/dL Final   • Hematocrit 03/10/2017 42.1  34.5 - 44.0 % Final   • MCV 03/10/2017 93.1  80.0 - 99.0 fL Final   • MCH 03/10/2017 31.0  27.0 - 31.0 pg Final   • MCHC 03/10/2017 33.3  32.0 - 36.0 g/dL Final   • RDW 03/10/2017 13.1  11.3 - 14.5 % Final   • RDW-SD 03/10/2017 44.9  37.0 - 54.0 fl Final   • MPV 03/10/2017 9.5  6.0 - 12.0 fL Final   • Platelets 03/10/2017 276  150 - 450 10*3/mm3 Final   • Neutrophil % 03/10/2017 85.5* 41.0 - 71.0 % Final   • Lymphocyte % 03/10/2017 8.8* 24.0 - 44.0 % Final   • Monocyte % 03/10/2017 4.8  0.0 - 12.0 % Final   • Eosinophil % 03/10/2017 0.5  0.0 - 3.0 % Final   • Basophil % 03/10/2017 0.1  0.0 - 1.0 % Final   • Immature Grans % 03/10/2017 0.3  0.0 - 0.6 % Final   • Neutrophils, Absolute 03/10/2017 10.42* 1.50 - 8.30 10*3/mm3 Final   • Lymphocytes, Absolute 03/10/2017 1.07  0.60 - 4.80 10*3/mm3 Final   • Monocytes, Absolute 03/10/2017 0.59  0.00 - 1.00 10*3/mm3 Final   • Eosinophils, Absolute 03/10/2017 0.06* 0.10 - 0.30 10*3/mm3 Final   • Basophils, Absolute 03/10/2017 0.01  0.00 - 0.20 10*3/mm3 Final   • Immature Grans, Absolute 03/10/2017 0.04* 0.00 - 0.03 10*3/mm3 Final       Xr Humerus Right    Result Date: 3/10/2017  Narrative: EXAMINATION: XR HUMERUS RIGHT- 03/10/2017  INDICATION: pain, fall, deformity  COMPARISON: NONE  FINDINGS: A fracture involves the  diaphyseal shaft of the humerus. There is marked overlap of fracture fragments. Marked soft tissue swelling is present.         Impression: Fracture of the right humerus with overlapping of fracture fragments.  D:  03/10/2017 E:  03/10/2017  This report was finalized on 3/10/2017 3:36 PM by Dr. Pb Singletary MD.      Xr Chest 1 View    Result Date: 3/10/2017  Narrative: EXAMINATION: XR CHEST 1 VW- 03/10/2017  INDICATION: S42.291A-Other displaced fracture of upper end of right humerus, initial encounter for closed fracture  COMPARISON: 01/19/2012  FINDINGS: The heart size is normal. There is no pneumothorax or effusion. The lungs are free of opacities. The osseous structures of the chest are normal.         Impression: No acute chest pathology.  D:  03/10/2017 E:  03/10/2017  This report was finalized on 3/10/2017 4:04 PM by Dr. Pb Singletary MD.          Assessment:62-year-old right-hand-dominant female with displaced humerus shaft fracture    Patient Active Problem List   Diagnosis   • Disorder of bilirubin excretion   • Elevated blood-pressure reading without diagnosis of hypertension   • Herpes zoster   • Carotid bruit   • Closed fracture of humerus   • GERD (gastroesophageal reflux disease)   • Osteopenia           Plan:  The patient voiced understanding of the risks, benefits, and alternative forms of treatment that were discussed and the patient consents to proceed with open reduction internal fixation of the right humerus shaft fracture.  -I reviewed the x-ray findings with Kelly and her sister. Her  returns from a business trip to [facility].  She understands that with this amount of fracture displacement and shortening that we would recommend open reduction internal fixation.  I discussed the advantages and disadvantages of operative versus nonoperative treatment and she agrees to proceed with surgery.  She understands risks to include but not limited to infection, pain, stiffness, malunion,  nonunion, neurovascular injury and medical risks associated with surgery.  She is comfortable in the long arm splint and pain is well-controlled.  -I discussed this case with Dr. Jimenes in the office today and she is on his surgery schedule for Monday for open reduction internal fixation of the right humerus shaft fracture.  -Continue the long-arm splint over the weekend and pain control as needed.  Okay to ambulate as tolerated.  -Plan nothing by mouth after midnight Sunday night for surgery Monday morning.  -I answered all questions to her satisfaction, she understands the plan and agrees to proceed with surgery.  -Thank you very much for this consult, Kelly was a pleasure to evaluate and treat.      Discharge Plan: plan discharge home 1-2 days postop      Date: 3/10/2017    Cameron Dias MD

## 2017-03-10 NOTE — H&P
Patient Name: Kelly Strickland  MRN: [id number]  : 1955  DOS: 3/10/2017    Attending: Percy Rhodes MD    Primary Care Provider: Mary Antonio DO      Chief complaint:  Right humerus fracture    Subjective   Patient is a 62 y.o. female presented to [facility] ER after a fall with complaints of right arm pain. Xray confirmed a right humerus fracture. She is being admitted for pain management and orthopedic consult for possible surgical intervention. She denies pain other than arm. She denies syncope at time of fall.    She apparently was going down some steps at her home, holding a grandchild. Missed a step when the dog went in front of her, fell landing on RUE.     When seen in her room she is doing ok. She is drowsy from pain medication, but pain is controlled. She denies nausea, shortness of breath or chest pain. No hx of DVT or PE.       Allergies:  Allergies   Allergen Reactions   • Other        Meds:  Prescriptions Prior to Admission   Medication Sig Dispense Refill Last Dose   • ibandronate (BONIVA) 150 MG tablet Take 1 tablet by mouth every 30 (thirty) days. 12 tablet 0 Past Month at Unknown time   • ibuprofen (ADVIL,MOTRIN) 200 MG tablet Take 200 mg by mouth Every 6 (Six) Hours As Needed for Mild Pain (1-3).      • sertraline (ZOLOFT) 50 MG tablet Take 1 tablet by mouth Daily. (Patient taking differently: Take 25 mg by mouth Every Night.) 30 tablet 1 Taking       History:   Past Medical History   Diagnosis Date   • Acid reflux    • Basal cell carcinoma of skin    • Herpes zoster    • Migraine    • Nephrolithiasis    • Osteopenia    • Thoracic vertebral fracture    • Vitamin D deficiency      Past Surgical History   Procedure Laterality Date   • Cholecystectomy     • Mohs surgery     • Cystoscopy w/ laser lithotripsy     • Breast biopsy       Family History   Problem Relation Age of Onset   • Stroke Mother    • Hypertension Mother    • Osteoporosis Mother    • Kidney cancer Father    • Prostate  "cancer Father    • Diabetes type II Father    • Breast cancer Sister    • Osteoporosis Maternal Aunt    • Breast cancer Maternal Aunt    • Colon cancer Maternal Aunt      Social History   Substance Use Topics   • Smoking status: Never Smoker   • Smokeless tobacco: None   • Alcohol use Yes      Comment: occasionally   . 2 children. Works in the home.    Review of Systems  All systems were reviewed and negative except for:  Gastrointestinal: postitive for  constipation    Vital Signs  Visit Vitals   • /62 (BP Location: Left arm, Patient Position: Lying)   • Pulse 92   • Temp 97.3 °F (36.3 °C) (Tympanic)   • Resp 16   • Ht 65\" (165.1 cm)   • Wt 158 lb (71.7 kg)   • SpO2 92%   • BMI 26.29 kg/m2       Physical Exam:    General Appearance:    Alert, cooperative, in no acute distress   Head:    Normocephalic, without obvious abnormality, atraumatic   Eyes:            Lids and lashes normal, conjunctivae and sclerae normal, no   icterus, no pallor, corneas clear, PERRLA   Ears:    Ears appear intact with no abnormalities noted   Neck:   No adenopathy, supple, trachea midline, no thyromegaly, no     carotid bruit, no JVD   Lungs:     Clear to auscultation,respirations regular, even and                   unlabored    Heart:    Regular rhythm and normal rate, normal S1 and S2, no            murmur, no gallop, no rub, no click   Abdomen:     Normal bowel sounds, no masses, no organomegaly, soft        non-tender, non-distended, no guarding, no rebound                 tenderness   Genitalia:    Deferred   Extremities:   Right arm in soft cast and sling. Good cap refill and movement of right digits.   Pulses:   Pulses palpable and equal bilaterally   Skin:   No bleeding, bruising or rash   Neurologic:   Cranial nerves 2 - 12 grossly intact, sensation intact      I reviewed the patient's new clinical results.         Results from last 7 days  Lab Units 03/10/17  0955   WBC 10*3/mm3 12.19*   HEMOGLOBIN g/dL 14.0 "   HEMATOCRIT % 42.1   PLATELETS 10*3/mm3 276       Results from last 7 days  Lab Units 03/10/17  0955   SODIUM mmol/L 141   POTASSIUM mmol/L 4.3   CHLORIDE mmol/L 110*   TOTAL CO2 mmol/L 29.0   BUN mg/dL 12   CREATININE mg/dL 0.70   CALCIUM mg/dL 9.0   GLUCOSE mg/dL 103*     Study Result      EXAMINATION: XR HUMERUS RIGHT- 03/10/2017      INDICATION: pain, fall, deformity       COMPARISON: NONE      FINDINGS: A fracture involves the diaphyseal shaft of the humerus. There  is marked overlap of fracture fragments. Marked soft tissue swelling is  present.       IMPRESSION:  Fracture of the right humerus with overlapping of fracture  fragments.      D: 03/10/2017  E: 03/10/2017       Assessment and Plan:   Active Problems:    Closed fracture of humerus    GERD (gastroesophageal reflux disease)    Osteopenia      Plan  1. Orthopedic consult( Discussed with . Planned surgery Monday by )  2. Pain control-prns( start with Lortab, Dilaudid prn, change as needed)  3. IS-encourage  4. DVT proph- [facility]  5. Bowel regimen  6. Resume home medications as appropriate  7. Monitor post-op labs  8. DC planning. Following surgery.WILFRID Dennison  03/10/17  1:44 PM   Seen and examined by me. Agree with above. Discussed with patient.

## 2017-03-11 LAB
ANION GAP SERPL CALCULATED.3IONS-SCNC: 6 MMOL/L (ref 3–11)
BASOPHILS # BLD AUTO: 0.01 10*3/MM3 (ref 0–0.2)
BASOPHILS NFR BLD AUTO: 0.1 % (ref 0–1)
BUN BLD-MCNC: 10 MG/DL (ref 9–23)
BUN/CREAT SERPL: 16.7 (ref 7–25)
CALCIUM SPEC-SCNC: 8.9 MG/DL (ref 8.7–10.4)
CHLORIDE SERPL-SCNC: 104 MMOL/L (ref 99–109)
CO2 SERPL-SCNC: 27 MMOL/L (ref 20–31)
CREAT BLD-MCNC: 0.6 MG/DL (ref 0.6–1.3)
DEPRECATED RDW RBC AUTO: 47.3 FL (ref 37–54)
EOSINOPHIL # BLD AUTO: 0.14 10*3/MM3 (ref 0.1–0.3)
EOSINOPHIL NFR BLD AUTO: 2.1 % (ref 0–3)
ERYTHROCYTE [DISTWIDTH] IN BLOOD BY AUTOMATED COUNT: 13.4 % (ref 11.3–14.5)
GFR SERPL CREATININE-BSD FRML MDRD: 101 ML/MIN/1.73
GLUCOSE BLD-MCNC: 91 MG/DL (ref 70–100)
HCT VFR BLD AUTO: 38.3 % (ref 34.5–44)
HGB BLD-MCNC: 12.3 G/DL (ref 11.5–15.5)
IMM GRANULOCYTES # BLD: 0.02 10*3/MM3 (ref 0–0.03)
IMM GRANULOCYTES NFR BLD: 0.3 % (ref 0–0.6)
LYMPHOCYTES # BLD AUTO: 1.3 10*3/MM3 (ref 0.6–4.8)
LYMPHOCYTES NFR BLD AUTO: 19.2 % (ref 24–44)
MCH RBC QN AUTO: 30.4 PG (ref 27–31)
MCHC RBC AUTO-ENTMCNC: 32.1 G/DL (ref 32–36)
MCV RBC AUTO: 94.8 FL (ref 80–99)
MONOCYTES # BLD AUTO: 0.5 10*3/MM3 (ref 0–1)
MONOCYTES NFR BLD AUTO: 7.4 % (ref 0–12)
NEUTROPHILS # BLD AUTO: 4.8 10*3/MM3 (ref 1.5–8.3)
NEUTROPHILS NFR BLD AUTO: 70.9 % (ref 41–71)
NRBC BLD MANUAL-RTO: 0 /100 WBC (ref 0–0)
PLATELET # BLD AUTO: 261 10*3/MM3 (ref 150–450)
PMV BLD AUTO: 9.6 FL (ref 6–12)
POTASSIUM BLD-SCNC: 3.8 MMOL/L (ref 3.5–5.5)
RBC # BLD AUTO: 4.04 10*6/MM3 (ref 3.89–5.14)
SODIUM BLD-SCNC: 137 MMOL/L (ref 132–146)
WBC NRBC COR # BLD: 6.77 10*3/MM3 (ref 3.5–10.8)

## 2017-03-11 PROCEDURE — G0378 HOSPITAL OBSERVATION PER HR: HCPCS

## 2017-03-11 PROCEDURE — 25010000002 HYDROMORPHONE PER 4 MG: Performed by: INTERNAL MEDICINE

## 2017-03-11 PROCEDURE — 85025 COMPLETE CBC W/AUTO DIFF WBC: CPT | Performed by: INTERNAL MEDICINE

## 2017-03-11 PROCEDURE — 96376 TX/PRO/DX INJ SAME DRUG ADON: CPT

## 2017-03-11 PROCEDURE — 80048 BASIC METABOLIC PNL TOTAL CA: CPT | Performed by: INTERNAL MEDICINE

## 2017-03-11 PROCEDURE — 94799 UNLISTED PULMONARY SVC/PX: CPT

## 2017-03-11 RX ORDER — OXYCODONE HYDROCHLORIDE AND ACETAMINOPHEN 5; 325 MG/1; MG/1
1 TABLET ORAL EVERY 4 HOURS PRN
Status: DISCONTINUED | OUTPATIENT
Start: 2017-03-11 | End: 2017-03-14

## 2017-03-11 RX ADMIN — OXYCODONE AND ACETAMINOPHEN 1 TABLET: 5; 325 TABLET ORAL at 10:34

## 2017-03-11 RX ADMIN — OXYCODONE AND ACETAMINOPHEN 1 TABLET: 5; 325 TABLET ORAL at 05:59

## 2017-03-11 RX ADMIN — OXYCODONE AND ACETAMINOPHEN 1 TABLET: 5; 325 TABLET ORAL at 02:15

## 2017-03-11 RX ADMIN — DOCUSATE SODIUM 100 MG: 100 CAPSULE, LIQUID FILLED ORAL at 07:49

## 2017-03-11 RX ADMIN — OXYCODONE AND ACETAMINOPHEN 1 TABLET: 5; 325 TABLET ORAL at 22:33

## 2017-03-11 RX ADMIN — HYDROMORPHONE HYDROCHLORIDE 0.5 MG: 1 INJECTION, SOLUTION INTRAMUSCULAR; INTRAVENOUS; SUBCUTANEOUS at 01:17

## 2017-03-11 RX ADMIN — OXYCODONE AND ACETAMINOPHEN 1 TABLET: 5; 325 TABLET ORAL at 18:34

## 2017-03-11 RX ADMIN — SERTRALINE HYDROCHLORIDE 25 MG: 50 TABLET ORAL at 20:54

## 2017-03-11 RX ADMIN — DOCUSATE SODIUM 100 MG: 100 CAPSULE, LIQUID FILLED ORAL at 18:34

## 2017-03-11 RX ADMIN — HYDROMORPHONE HYDROCHLORIDE 0.5 MG: 1 INJECTION, SOLUTION INTRAMUSCULAR; INTRAVENOUS; SUBCUTANEOUS at 20:54

## 2017-03-11 RX ADMIN — OXYCODONE AND ACETAMINOPHEN 1 TABLET: 5; 325 TABLET ORAL at 14:44

## 2017-03-11 NOTE — PROGRESS NOTES
"IM progress note      Kelly Strickland  [id number]  1955     LOS: 1 day     Attending: Percy Rhodes MD    Primary Care Provider: Mary Antonio DO      Chief Complaint/Reason for visit:    Chief Complaint   Patient presents with   • Arm Injury       Subjective   Pain is better controlled with Percocet. No f/c/n/vom/sob.     Objective     Vital Signs  Blood pressure 118/56, pulse 73, temperature 98.4 °F (36.9 °C), temperature source Temporal Artery , resp. rate 16, height 65\" (165.1 cm), weight 158 lb (71.7 kg), SpO2 96 %.  Temp (24hrs), Av.9 °F (36.6 °C), Min:97.3 °F (36.3 °C), Max:98.4 °F (36.9 °C)      Intake/Output:    Intake/Output Summary (Last 24 hours) at 17 1041  Last data filed at 17 0758   Gross per 24 hour   Intake      0 ml   Output    450 ml   Net   -450 ml           Physical Exam:     General Appearance:    Alert, cooperative, in no acute distress   Head:    Normocephalic, without obvious abnormality, atraumatic    Lungs:     Normal effort, symmetric chest rise, no crepitus, clear to      auscultation bilaterally                  Heart:    Regular rhythm and normal rate, normal S1 and S2    Abdomen:     Normal bowel sounds, no masses, no organomegaly, soft        non-tender, non-distended, no guarding, no rebound                tenderness   Extremities:   No clubbing, cyanosis or edema.  No deformities.    RUE in sling and splint. Moves hand well. Cap refill normal.   Pulses:   Pulses palpable and equal bilaterally   Skin:   No bleeding, bruising or rash          Results Review:     I reviewed the patient's new clinical results.     Results from last 7 days  Lab Units 17  0513 03/10/17  0955   WBC 10*3/mm3 6.77 12.19*   HEMOGLOBIN g/dL 12.3 14.0   HEMATOCRIT % 38.3 42.1   PLATELETS 10*3/mm3 261 276       Results from last 7 days  Lab Units 17  0513 03/10/17  0955   SODIUM mmol/L 137 141   POTASSIUM mmol/L 3.8 4.3   CHLORIDE mmol/L 104 110*   TOTAL CO2 mmol/L " 27.0 29.0   BUN mg/dL 10 12   CREATININE mg/dL 0.60 0.70   CALCIUM mg/dL 8.9 9.0   GLUCOSE mg/dL 91 103*     I reviewed the patient's new imaging including images and reports.    All medications reviewed.     docusate sodium 100 mg Oral BID   sertraline 25 mg Oral Nightly       hydrALAZINE 10 mg Q6H PRN   HYDROcodone-acetaminophen 1 tablet Q4H PRN   HYDROmorphone 0.5 mg Q2H PRN   ondansetron 4 mg Q6H PRN   oxyCODONE-acetaminophen 1 tablet Q4H PRN   pneumococcal polysaccharide 23-valent 0.5 mL During Hospitalization   sodium chloride 500 mL TID PRN   sodium chloride 1-10 mL PRN       Assessment/Plan     Active Problems:    Closed fracture of humerus, right.     GERD (gastroesophageal reflux disease)    Osteopenia       Plan  1. Encouraged ambulation.  2. Pain control-prns. Regimen working well.    3. IS-encouraged  4. DVT proph-[facility]/ in expectation of surgery Monday/ambulate.  5. Bowel regimen     Discussed with pt and her . Discussed with .    Percy Rhodes MD  03/11/17  10:41 AM

## 2017-03-11 NOTE — PROGRESS NOTES
"Visit Vitals   • /56   • Pulse 73   • Temp 98.4 °F (36.9 °C) (Temporal Artery )   • Resp 16   • Ht 65\" (165.1 cm)   • Wt 158 lb (71.7 kg)   • SpO2 96%   • BMI 26.29 kg/m2       Lab Results (last 24 hours)     Procedure Component Value Units Date/Time    CBC & Differential [[id number]] Collected:  03/10/17 0955    Specimen:  Blood Updated:  03/10/17 1036    Narrative:       The following orders were created for panel order CBC & Differential.  Procedure                               Abnormality         Status                     ---------                               -----------         ------                     CBC Auto Differential[[id number]]         Abnormal            Final result                 Please view results for these tests on the individual orders.    CBC Auto Differential [[id number]]  (Abnormal) Collected:  03/10/17 0955    Specimen:  Blood from Arm, Left Updated:  03/10/17 1036     WBC 12.19 (H) 10*3/mm3      RBC 4.52 10*6/mm3      Hemoglobin 14.0 g/dL      Hematocrit 42.1 %      MCV 93.1 fL      MCH 31.0 pg      MCHC 33.3 g/dL      RDW 13.1 %      RDW-SD 44.9 fl      MPV 9.5 fL      Platelets 276 10*3/mm3      Neutrophil % 85.5 (H) %      Lymphocyte % 8.8 (L) %      Monocyte % 4.8 %      Eosinophil % 0.5 %      Basophil % 0.1 %      Immature Grans % 0.3 %      Neutrophils, Absolute 10.42 (H) 10*3/mm3      Lymphocytes, Absolute 1.07 10*3/mm3      Monocytes, Absolute 0.59 10*3/mm3      Eosinophils, Absolute 0.06 (L) 10*3/mm3      Basophils, Absolute 0.01 10*3/mm3      Immature Grans, Absolute 0.04 (H) 10*3/mm3     Basic Metabolic Panel [[id number]]  (Abnormal) Collected:  03/10/17 0955    Specimen:  Blood from Arm, Left Updated:  03/10/17 1055     Glucose 103 (H) mg/dL      BUN 12 mg/dL      Creatinine 0.70 mg/dL      Sodium 141 mmol/L      Potassium 4.3 mmol/L      Chloride 110 (H) mmol/L      CO2 29.0 mmol/L      Calcium 9.0 mg/dL      eGFR Non African Amer 85 mL/min/1.73      BUN/Creatinine Ratio " 17.1      Anion Gap 2.0 (L) mmol/L     Narrative:       National Kid[employer] Guidelines    Stage                           Description                             GFR                      1                               Normal or High                          90+  2                               Mild decrease                            60-89  3                               Moderate decrease                   30-59  4                               Severe decrease                       15-29  5                               Kidney failure                             <15    Green Top (Gel) [[id number]] Collected:  03/10/17 0955    Specimen:  Blood from Arm, Left Updated:  03/10/17 1401     Extra Tube Hold for add-ons.       Auto resulted.       Lavender Top [[id number]] Collected:  03/10/17 0955    Specimen:  Blood from Arm, Left Updated:  03/10/17 1401     Extra Tube hold for add-on       Auto resulted       [place] Draw [[id number]] Collected:  03/10/17 0955    Specimen:  Blood Updated:  03/10/17 1501    Narrative:       The following orders were created for panel order [place] Draw.  Procedure                               Abnormality         Status                     ---------                               -----------         ------                     Light Blue Top[[id number]]                                    Final result               Green Top (Gel)[[id number]]                                   Final result               Lavender Top[[id number]]                                      Final result               Gold Top - SST[[id number]]                                    Final result               Green Top (No Gel)[[id number]]                                                             Please view results for these tests on the individual orders.    Light Blue Top [[id number]] Collected:  03/10/17 1019    Specimen:  Blood Updated:  03/10/17 1501     Extra Tube hold for add-on       Auto resulted       Gold Top - SST  [[id number]] Collected:  03/10/17 1019    Specimen:  Blood Updated:  03/10/17 1501     Extra Tube Hold for add-ons.       Auto resulted.       Basic Metabolic Panel [[id number]]  (Normal) Collected:  03/11/17 0513    Specimen:  Blood Updated:  03/11/17 0707     Glucose 91 mg/dL      BUN 10 mg/dL      Creatinine 0.60 mg/dL      Sodium 137 mmol/L      Potassium 3.8 mmol/L      Chloride 104 mmol/L      CO2 27.0 mmol/L      Calcium 8.9 mg/dL      eGFR Non African Amer 101 mL/min/1.73      BUN/Creatinine Ratio 16.7      Anion Gap 6.0 mmol/L     Narrative:       National Kidney Foundation Guidelines    Stage                           Description                             GFR                      1                               Normal or High                          90+  2                               Mild decrease                            60-89  3                               Moderate decrease                   30-59  4                               Severe decrease                       15-29  5                               Kidney failure                             <15    CBC Auto Differential [[id number]]  (Abnormal) Collected:  03/11/17 0513    Specimen:  Blood Updated:  03/11/17 0826     WBC 6.77 10*3/mm3      RBC 4.04 10*6/mm3      Hemoglobin 12.3 g/dL      Hematocrit 38.3 %      MCV 94.8 fL      MCH 30.4 pg      MCHC 32.1 g/dL      RDW 13.4 %      RDW-SD 47.3 fl      MPV 9.6 fL      Platelets 261 10*3/mm3      Neutrophil % 70.9 %      Lymphocyte % 19.2 (L) %      Monocyte % 7.4 %      Eosinophil % 2.1 %      Basophil % 0.1 %      Immature Grans % 0.3 %      Neutrophils, Absolute 4.80 10*3/mm3      Lymphocytes, Absolute 1.30 10*3/mm3      Monocytes, Absolute 0.50 10*3/mm3      Eosinophils, Absolute 0.14 10*3/mm3      Basophils, Absolute 0.01 10*3/mm3      Immature Grans, Absolute 0.02 10*3/mm3      nRBC 0.0 /100 WBC     Narrative:       Verified by repeat analysis.          Imaging Results (last 24 hours)      Procedure Component Value Units Date/Time    XR Humerus Right [[id number]] Collected:  03/10/17 0951     Updated:  03/10/17 1538    Narrative:       EXAMINATION: XR HUMERUS RIGHT- 03/10/2017     INDICATION: pain, fall, deformity      COMPARISON: NONE     FINDINGS: A fracture involves the diaphyseal shaft of the humerus. There  is marked overlap of fracture fragments. Marked soft tissue swelling is  present.           Impression:       Fracture of the right humerus with overlapping of fracture  fragments.     D:  03/10/2017  E:  03/10/2017     This report was finalized on 3/10/2017 3:36 PM by Dr. Pb Singletary MD.       XR Chest 1 View [[id number]] Collected:  03/10/17 1210     Updated:  03/10/17 1606    Narrative:       EXAMINATION: XR CHEST 1 VW- 03/10/2017     INDICATION: S42.291A-Other displaced fracture of upper end of right  humerus, initial encounter for closed fracture      COMPARISON: 01/19/2012     FINDINGS: The heart size is normal. There is no pneumothorax or  effusion. The lungs are free of opacities. The osseous structures of the  chest are normal.           Impression:       No acute chest pathology.     D:  03/10/2017  E:  03/10/2017     This report was finalized on 3/10/2017 4:04 PM by Dr. Pb Singletary MD.             Patient Care Team:  Mary Antonio DO as PCP - General    SUBJECTIVE: Kelly reports she is feeling better.  Pain is well-controlled with Percocet.  She is eating and drinking okay.  She's been getting up to go to the bathroom on her own.    PHYSICAL EXAM  Right long arm splint is clean, dry and intact  Neurovascularly intact distally     Active Problems:    Closed fracture of humerus    GERD (gastroesophageal reflux disease)    Osteopenia      PLAN / DISPOSITION: 62-year-old female with displaced right humerus shaft fracture    -Continue adequate pain control over the weekend, currently doing well with Percocet.  -Plan open reduction internal fixation right humerus shaft fracture by   Yudy on Monday.    Cameron Dias MD  03/11/17  9:06 AM

## 2017-03-11 NOTE — PLAN OF CARE
Problem: Fall Risk (Adult)  Goal: Identify Related Risk Factors and Signs and Symptoms  Outcome: Ongoing (interventions implemented as appropriate)    03/11/17 0443   Fall Risk   Fall Risk: Related Risk Factors history of falls;environment unfamiliar   Fall Risk: Signs and Symptoms presence of risk factors       Goal: Absence of Falls  Outcome: Ongoing (interventions implemented as appropriate)    03/11/17 0443   Fall Risk (Adult)   Absence of Falls making progress toward outcome         Problem: Pain, Acute (Adult)  Goal: Identify Related Risk Factors and Signs and Symptoms  Outcome: Ongoing (interventions implemented as appropriate)    03/11/17 0443   Pain, Acute   Related Risk Factors (Acute Pain) trauma   Signs and Symptoms (Acute Pain) BADLs/IADLs reluctance/inability to perform;guarding/abnormal posturing/positioning;verbalization of pain descriptors       Goal: Acceptable Pain Control/Comfort Level  Outcome: Ongoing (interventions implemented as appropriate)    03/11/17 0443   Pain, Acute (Adult)   Acceptable Pain Control/Comfort Level making progress toward outcome         Problem: Patient Care Overview (Adult)  Goal: Plan of Care Review  Outcome: Ongoing (interventions implemented as appropriate)    03/11/17 0443   Coping/Psychosocial Response Interventions   Plan Of Care Reviewed With patient   Patient Care Overview   Progress no change   Outcome Evaluation   Outcome Summary/Follow up Plan Patients pain was better controlled since adding Perocet PRN patient was able to rest several hours and reports pain was decreased.

## 2017-03-12 ENCOUNTER — ANESTHESIA EVENT (OUTPATIENT)
Dept: PERIOP | Facility: HOSPITAL | Age: 62
End: 2017-03-12

## 2017-03-12 PROCEDURE — 94799 UNLISTED PULMONARY SVC/PX: CPT

## 2017-03-12 PROCEDURE — 25010000002 HYDROMORPHONE PER 4 MG: Performed by: INTERNAL MEDICINE

## 2017-03-12 PROCEDURE — 96376 TX/PRO/DX INJ SAME DRUG ADON: CPT

## 2017-03-12 PROCEDURE — G0378 HOSPITAL OBSERVATION PER HR: HCPCS

## 2017-03-12 RX ORDER — FAMOTIDINE 10 MG/ML
20 INJECTION, SOLUTION INTRAVENOUS ONCE
Status: CANCELLED | OUTPATIENT
Start: 2017-03-12 | End: 2017-03-12

## 2017-03-12 RX ORDER — FAMOTIDINE 20 MG/1
20 TABLET, FILM COATED ORAL ONCE
Status: CANCELLED | OUTPATIENT
Start: 2017-03-12 | End: 2017-03-12

## 2017-03-12 RX ORDER — LIDOCAINE HYDROCHLORIDE 10 MG/ML
1 INJECTION, SOLUTION EPIDURAL; INFILTRATION; INTRACAUDAL; PERINEURAL ONCE
Status: CANCELLED | OUTPATIENT
Start: 2017-03-12 | End: 2017-03-12

## 2017-03-12 RX ORDER — SODIUM CHLORIDE 0.9 % (FLUSH) 0.9 %
1-10 SYRINGE (ML) INJECTION AS NEEDED
Status: CANCELLED | OUTPATIENT
Start: 2017-03-12

## 2017-03-12 RX ORDER — SODIUM CHLORIDE, SODIUM LACTATE, POTASSIUM CHLORIDE, CALCIUM CHLORIDE 600; 310; 30; 20 MG/100ML; MG/100ML; MG/100ML; MG/100ML
9 INJECTION, SOLUTION INTRAVENOUS CONTINUOUS
Status: CANCELLED | OUTPATIENT
Start: 2017-03-12

## 2017-03-12 RX ADMIN — OXYCODONE AND ACETAMINOPHEN 1 TABLET: 5; 325 TABLET ORAL at 16:07

## 2017-03-12 RX ADMIN — OXYCODONE AND ACETAMINOPHEN 1 TABLET: 5; 325 TABLET ORAL at 07:32

## 2017-03-12 RX ADMIN — SERTRALINE HYDROCHLORIDE 25 MG: 50 TABLET ORAL at 20:54

## 2017-03-12 RX ADMIN — HYDROMORPHONE HYDROCHLORIDE 0.5 MG: 1 INJECTION, SOLUTION INTRAMUSCULAR; INTRAVENOUS; SUBCUTANEOUS at 18:05

## 2017-03-12 RX ADMIN — OXYCODONE AND ACETAMINOPHEN 1 TABLET: 5; 325 TABLET ORAL at 03:45

## 2017-03-12 RX ADMIN — DOCUSATE SODIUM 100 MG: 100 CAPSULE, LIQUID FILLED ORAL at 17:36

## 2017-03-12 RX ADMIN — HYDROMORPHONE HYDROCHLORIDE 0.5 MG: 1 INJECTION, SOLUTION INTRAMUSCULAR; INTRAVENOUS; SUBCUTANEOUS at 23:24

## 2017-03-12 RX ADMIN — DOCUSATE SODIUM 100 MG: 100 CAPSULE, LIQUID FILLED ORAL at 09:39

## 2017-03-12 RX ADMIN — OXYCODONE AND ACETAMINOPHEN 1 TABLET: 5; 325 TABLET ORAL at 20:54

## 2017-03-12 RX ADMIN — OXYCODONE AND ACETAMINOPHEN 1 TABLET: 5; 325 TABLET ORAL at 11:50

## 2017-03-12 NOTE — PROGRESS NOTES
"IM progress note      Kelly Strickland  [id number]  1955     LOS: 1 day     Attending: Percy Rhodes MD    Primary Care Provider: Mary Antonio DO      Chief Complaint/Reason for visit:    Chief Complaint   Patient presents with   • Arm Injury       Subjective   Doing well. Pain control is adequate. Denies f/c/n/v/sob/cp.    Objective     Vital Signs  Blood pressure 121/66, pulse 75, temperature 97.5 °F (36.4 °C), resp. rate 16, height 65\" (165.1 cm), weight 158 lb (71.7 kg), SpO2 95 %.  Temp (24hrs), Av °F (36.7 °C), Min:97.3 °F (36.3 °C), Max:98.7 °F (37.1 °C)      Intake/Output:    Intake/Output Summary (Last 24 hours) at 17 0951  Last data filed at 17 1608   Gross per 24 hour   Intake      0 ml   Output    900 ml   Net   -900 ml       Physical Exam:     General Appearance:    Alert, cooperative, in no acute distress   Head:    Normocephalic, without obvious abnormality, atraumatic    Lungs:     Normal effort, symmetric chest rise, no crepitus, clear to      auscultation bilaterally                  Heart:    Regular rhythm and normal rate, normal S1 and S2    Abdomen:     Normal bowel sounds   Extremities:   No clubbing, cyanosis or edema.  No deformities.    RUE in sling and splint. Moves hand well. Cap refill normal.   Pulses:   Pulses palpable and equal bilaterally   Skin:   No bleeding, bruising or rash          Results Review:     I reviewed the patient's new clinical results.     Results from last 7 days  Lab Units 17  0513 03/10/17  0955   WBC 10*3/mm3 6.77 12.19*   HEMOGLOBIN g/dL 12.3 14.0   HEMATOCRIT % 38.3 42.1   PLATELETS 10*3/mm3 261 276       Results from last 7 days  Lab Units 17  0513 03/10/17  0955   SODIUM mmol/L 137 141   POTASSIUM mmol/L 3.8 4.3   CHLORIDE mmol/L 104 110*   TOTAL CO2 mmol/L 27.0 29.0   BUN mg/dL 10 12   CREATININE mg/dL 0.60 0.70   CALCIUM mg/dL 8.9 9.0   GLUCOSE mg/dL 91 103*     I reviewed the patient's new imaging including " images and reports.    All medications reviewed.     docusate sodium 100 mg Oral BID   sertraline 25 mg Oral Nightly       hydrALAZINE 10 mg Q6H PRN   HYDROcodone-acetaminophen 1 tablet Q4H PRN   HYDROmorphone 0.5 mg Q2H PRN   ondansetron 4 mg Q6H PRN   oxyCODONE-acetaminophen 1 tablet Q4H PRN   pneumococcal polysaccharide 23-valent 0.5 mL During Hospitalization   sodium chloride 500 mL TID PRN   sodium chloride 1-10 mL PRN       Assessment/Plan   Active Problems:    Closed fracture of humerus, right.     GERD (gastroesophageal reflux disease)    Osteopenia       Plan  1. Encouraged ambulation.  2. Pain control-prns.  3. IS-encouraged use, explained importance  4. DVT proph- [facility] (In expectation of surgery Monday)  5. Bowel regimen  6. OR Monday by Dr. Yudy More, APRN  03/12/17  9:51 AM

## 2017-03-12 NOTE — PLAN OF CARE
Problem: Fall Risk (Adult)  Intervention: Monitor/Assist with Self Care    03/11/17 0800 03/11/17 2233 03/12/17 1438   Monitor/Assist with Self Care   Ambulation --  --  2-->assistive person   Transferring --  --  2-->assistive person   Toileting --  --  2-->assistive person   Bathing --  --  2-->assistive person   Dressing --  --  2-->assistive person   Eating --  --  0-->independent   Communication --  --  0-->understands/communicates without difficulty   Swallowing (if score 2 or more for any item, consult Rehab Services) --  0-->swallows foods/liquids without difficulty --    Activity   Activity Type --  --  activity adjusted per tolerance   Activity Level of Assistance --  --  assistance, 1 person   Musculoskeletal Interventions   Self-Care Promotion independence encouraged --  --        Intervention: Reduce Risk/Promote Restraint Free Environment    03/12/17 1438   Safety Interventions   Safety/Security Measures bed alarm set   Environmental Safety Modification clutter free environment maintained   Restraint Interventions   Safety Promotion/Fall Prevention safety round/check completed       Intervention: Review Medications/Identify Contributors to Fall Risk    03/11/17 0800   Safety Interventions   Medication Review/Management medications reviewed         Goal: Identify Related Risk Factors and Signs and Symptoms  Outcome: Ongoing (interventions implemented as appropriate)    03/11/17 0443   Fall Risk   Fall Risk: Related Risk Factors history of falls;environment unfamiliar   Fall Risk: Signs and Symptoms presence of risk factors       Goal: Absence of Falls  Outcome: Ongoing (interventions implemented as appropriate)    Problem: Pain, Acute (Adult)  Intervention: Monitor/Manage Analgesia    03/11/17 0800 03/11/17 2233 03/12/17 1438   Manage Acute Burn Pain   Bowel Intervention --  ambulation promoted;adequate fluid intake promoted --    Pain Management Interventions --  --  pillow support   Safety  Interventions   Medication Review/Management medications reviewed --  --        Intervention: Mutually Develop/Implement Acute Pain Management Plan    03/12/17 1438   Manage Acute Burn Pain   Pain Management Interventions pillow support       Intervention: Support/Optimize Psychosocial Response to Acute Pain    03/10/17 2010 03/11/17 0800   Coping Strategies   Trust Relationship/Rapport --  care explained;choices provided;questions answered   Diversional Activities television --    Supportive Measures --  active listening utilized         Goal: Identify Related Risk Factors and Signs and Symptoms  Outcome: Ongoing (interventions implemented as appropriate)    03/12/17 0314   Pain, Acute   Related Risk Factors (Acute Pain) trauma   Signs and Symptoms (Acute Pain) verbalization of pain descriptors;guarding/abnormal posturing/positioning       Goal: Acceptable Pain Control/Comfort Level  Outcome: Ongoing (interventions implemented as appropriate)    03/12/17 1530   Pain, Acute (Adult)   Acceptable Pain Control/Comfort Level making progress toward outcome         Problem: Patient Care Overview (Adult)  Goal: Plan of Care Review  Outcome: Ongoing (interventions implemented as appropriate)    03/12/17 0314 03/12/17 1438   Coping/Psychosocial Response Interventions   Plan Of Care Reviewed With --  patient   Patient Care Overview   Progress no change --    Outcome Evaluation   Outcome Summary/Follow up Plan Pt alert and oriented x4, moderate swelling to right shoulder noted, right  moderate, radial pulses +2, c/o pain managed with PRN pain medications.  --        Goal: Adult Individualization and Mutuality  Outcome: Ongoing (interventions implemented as appropriate)    03/12/17 1530   Individualization   Patient Specific Goals pain control       Goal: Discharge Needs Assessment  Outcome: Ongoing (interventions implemented as appropriate)    03/12/17 0314   Discharge Needs Assessment   Concerns To Be Addressed no  discharge needs identified   Readmission Within The Last 30 Days no previous admission in last 30 days   Discharge Disposition still a patient   Discharge Planning Comments NPO at midnight 3-13-17 surgery with dr. fuller monday.   Current Health   Anticipated Changes Related to Illness inability to care for self   Self-Care   Equipment Currently Used at Home none   Living Environment   Transportation Available car

## 2017-03-12 NOTE — PLAN OF CARE
Problem: Fall Risk (Adult)  Intervention: Reduce Risk/Promote Restraint Free Environment    03/12/17 0154   Safety Interventions   Safety/Security Measures bed alarm set   Environmental Safety Modification assistive device/personal items within reach   Restraint Interventions   Safety Promotion/Fall Prevention safety round/check completed;toileting scheduled         Goal: Identify Related Risk Factors and Signs and Symptoms    03/11/17 0443   Fall Risk   Fall Risk: Related Risk Factors history of falls;environment unfamiliar   Fall Risk: Signs and Symptoms presence of risk factors       Goal: Absence of Falls    03/12/17 0314   Fall Risk (Adult)   Absence of Falls making progress toward outcome         Problem: Pain, Acute (Adult)  Goal: Identify Related Risk Factors and Signs and Symptoms    03/12/17 0314   Pain, Acute   Related Risk Factors (Acute Pain) trauma   Signs and Symptoms (Acute Pain) verbalization of pain descriptors;guarding/abnormal posturing/positioning         Problem: Patient Care Overview (Adult)  Goal: Plan of Care Review    03/12/17 0314   Coping/Psychosocial Response Interventions   Plan Of Care Reviewed With patient   Patient Care Overview   Progress no change   Outcome Evaluation   Outcome Summary/Follow up Plan Pt alert and oriented x4, moderate swelling to right shoulder noted, right  moderate, radial pulses +2, c/o pain managed with PRN pain medications.        Goal: Discharge Needs Assessment    03/12/17 0314   Discharge Needs Assessment   Concerns To Be Addressed no discharge needs identified   Readmission Within The Last 30 Days no previous admission in last 30 days   Discharge Disposition still a patient   Discharge Planning Comments NPO at midnight 3-13-17 surgery with dr. fuller monday.   Current Health   Anticipated Changes Related to Illness inability to care for self   Self-Care   Equipment Currently Used at Home none   Living Environment   Transportation Available car

## 2017-03-12 NOTE — PROGRESS NOTES
"Visit Vitals   • /66   • Pulse 75   • Temp 97.5 °F (36.4 °C)   • Resp 16   • Ht 65\" (165.1 cm)   • Wt 158 lb (71.7 kg)   • SpO2 95%   • BMI 26.29 kg/m2       Lab Results (last 24 hours)     ** No results found for the last 24 hours. **          Imaging Results (last 24 hours)     ** No results found for the last 24 hours. **          Patient Care Team:  Mary Antonio DO as PCP - General    SUBJECTIVE: Kelly reports her pain has been well-controlled.  She does have significant pain when she gets up to go the bathroom but otherwise is doing well.  She is tolerating a regular diet.  She is ready for surgery tomorrow.    PHYSICAL EXAM  Right long arm splint is clean, dry and intact  Neurovascularly intact distally     Active Problems:    Closed fracture of humerus    GERD (gastroesophageal reflux disease)    Osteopenia      PLAN / DISPOSITION: 62-year-old female with right displaced humerus shaft fracture  -Plan to surgery tomorrow for ORIF right humerus shaft fracture with Dr. Jimenes  -Continue adequate pain control  -I answered her questions regarding the surgery and recovery period and they are comfortable with the plan.  -Nothing by mouth after midnight and plan on Ancef for preoperative antibiotics    Cameron Dias MD  03/12/17  10:12 AM      "

## 2017-03-13 ENCOUNTER — ANESTHESIA (OUTPATIENT)
Dept: PERIOP | Facility: HOSPITAL | Age: 62
End: 2017-03-13

## 2017-03-13 ENCOUNTER — APPOINTMENT (OUTPATIENT)
Dept: GENERAL RADIOLOGY | Facility: HOSPITAL | Age: 62
End: 2017-03-13

## 2017-03-13 PROCEDURE — 25010000002 FENTANYL CITRATE (PF) 100 MCG/2ML SOLUTION: Performed by: NURSE ANESTHETIST, CERTIFIED REGISTERED

## 2017-03-13 PROCEDURE — G0378 HOSPITAL OBSERVATION PER HR: HCPCS

## 2017-03-13 PROCEDURE — 94799 UNLISTED PULMONARY SVC/PX: CPT

## 2017-03-13 PROCEDURE — 96376 TX/PRO/DX INJ SAME DRUG ADON: CPT

## 2017-03-13 PROCEDURE — 25010000002 ROPIVACAINE PER 1 MG: Performed by: NURSE ANESTHETIST, CERTIFIED REGISTERED

## 2017-03-13 PROCEDURE — C1713 ANCHOR/SCREW BN/BN,TIS/BN: HCPCS | Performed by: ORTHOPAEDIC SURGERY

## 2017-03-13 PROCEDURE — 25010000002 PROPOFOL 10 MG/ML EMULSION: Performed by: NURSE ANESTHETIST, CERTIFIED REGISTERED

## 2017-03-13 PROCEDURE — 25010000002 ONDANSETRON PER 1 MG: Performed by: NURSE ANESTHETIST, CERTIFIED REGISTERED

## 2017-03-13 PROCEDURE — 25010000002 FENTANYL CITRATE (PF) 100 MCG/2ML SOLUTION: Performed by: ANESTHESIOLOGY

## 2017-03-13 PROCEDURE — 25010000002 ONDANSETRON PER 1 MG: Performed by: INTERNAL MEDICINE

## 2017-03-13 PROCEDURE — 25010000002 HYDROMORPHONE PER 4 MG: Performed by: INTERNAL MEDICINE

## 2017-03-13 PROCEDURE — 25010000002 PROMETHAZINE PER 50 MG: Performed by: NURSE ANESTHETIST, CERTIFIED REGISTERED

## 2017-03-13 PROCEDURE — 76000 FLUOROSCOPY <1 HR PHYS/QHP: CPT

## 2017-03-13 PROCEDURE — 25010000002 HYDROMORPHONE PER 4 MG: Performed by: NURSE ANESTHETIST, CERTIFIED REGISTERED

## 2017-03-13 DEVICE — SCRW CORT S/TAP 3.5X28MM: Type: IMPLANTABLE DEVICE | Site: ARM | Status: FUNCTIONAL

## 2017-03-13 DEVICE — SCRW CORT S/TAP 3.5X24MM: Type: IMPLANTABLE DEVICE | Status: FUNCTIONAL

## 2017-03-13 DEVICE — SCRW CORT S/TAP 4.5X30MM: Type: IMPLANTABLE DEVICE | Site: ARM | Status: FUNCTIONAL

## 2017-03-13 DEVICE — PLT LCP NRW SS 9H 4.5X170MM: Type: IMPLANTABLE DEVICE | Site: ARM | Status: FUNCTIONAL

## 2017-03-13 DEVICE — SCRW CORT S/TAP 4.5X26MM: Type: IMPLANTABLE DEVICE | Site: ARM | Status: FUNCTIONAL

## 2017-03-13 DEVICE — SCRW CORT S/TAP 4.5X28MM: Type: IMPLANTABLE DEVICE | Site: ARM | Status: FUNCTIONAL

## 2017-03-13 RX ORDER — CEFAZOLIN SODIUM 2 G/100ML
2 INJECTION, SOLUTION INTRAVENOUS ONCE
Status: DISCONTINUED | OUTPATIENT
Start: 2017-03-13 | End: 2017-03-13 | Stop reason: HOSPADM

## 2017-03-13 RX ORDER — ONDANSETRON 2 MG/ML
4 INJECTION INTRAMUSCULAR; INTRAVENOUS ONCE AS NEEDED
Status: DISCONTINUED | OUTPATIENT
Start: 2017-03-13 | End: 2017-03-13 | Stop reason: HOSPADM

## 2017-03-13 RX ORDER — PROMETHAZINE HYDROCHLORIDE 25 MG/1
25 SUPPOSITORY RECTAL ONCE AS NEEDED
Status: COMPLETED | OUTPATIENT
Start: 2017-03-13 | End: 2017-03-13

## 2017-03-13 RX ORDER — MAGNESIUM HYDROXIDE 1200 MG/15ML
LIQUID ORAL AS NEEDED
Status: DISCONTINUED | OUTPATIENT
Start: 2017-03-13 | End: 2017-03-13 | Stop reason: HOSPADM

## 2017-03-13 RX ORDER — PROMETHAZINE HYDROCHLORIDE 25 MG/1
25 TABLET ORAL ONCE AS NEEDED
Status: COMPLETED | OUTPATIENT
Start: 2017-03-13 | End: 2017-03-13

## 2017-03-13 RX ORDER — NALOXONE HCL 0.4 MG/ML
0.4 VIAL (ML) INJECTION AS NEEDED
Status: DISCONTINUED | OUTPATIENT
Start: 2017-03-13 | End: 2017-03-13 | Stop reason: HOSPADM

## 2017-03-13 RX ORDER — SODIUM CHLORIDE 0.9 % (FLUSH) 0.9 %
1-10 SYRINGE (ML) INJECTION AS NEEDED
Status: DISCONTINUED | OUTPATIENT
Start: 2017-03-13 | End: 2017-03-13 | Stop reason: HOSPADM

## 2017-03-13 RX ORDER — FENTANYL CITRATE 50 UG/ML
100 INJECTION, SOLUTION INTRAMUSCULAR; INTRAVENOUS ONCE
Status: COMPLETED | OUTPATIENT
Start: 2017-03-13 | End: 2017-03-13

## 2017-03-13 RX ORDER — PROPOFOL 10 MG/ML
VIAL (ML) INTRAVENOUS AS NEEDED
Status: DISCONTINUED | OUTPATIENT
Start: 2017-03-13 | End: 2017-03-13 | Stop reason: SURG

## 2017-03-13 RX ORDER — ROPIVACAINE HYDROCHLORIDE 2 MG/ML
6 INJECTION, SOLUTION EPIDURAL; INFILTRATION CONTINUOUS
Status: DISCONTINUED | OUTPATIENT
Start: 2017-03-13 | End: 2017-03-14

## 2017-03-13 RX ORDER — FAMOTIDINE 20 MG/1
20 TABLET, FILM COATED ORAL ONCE
Status: COMPLETED | OUTPATIENT
Start: 2017-03-13 | End: 2017-03-13

## 2017-03-13 RX ORDER — HYDROMORPHONE HYDROCHLORIDE 1 MG/ML
0.5 INJECTION, SOLUTION INTRAMUSCULAR; INTRAVENOUS; SUBCUTANEOUS
Status: DISCONTINUED | OUTPATIENT
Start: 2017-03-13 | End: 2017-03-13 | Stop reason: HOSPADM

## 2017-03-13 RX ORDER — PROMETHAZINE HYDROCHLORIDE 25 MG/ML
6.25 INJECTION, SOLUTION INTRAMUSCULAR; INTRAVENOUS ONCE AS NEEDED
Status: COMPLETED | OUTPATIENT
Start: 2017-03-13 | End: 2017-03-13

## 2017-03-13 RX ORDER — SODIUM CHLORIDE, SODIUM LACTATE, POTASSIUM CHLORIDE, CALCIUM CHLORIDE 600; 310; 30; 20 MG/100ML; MG/100ML; MG/100ML; MG/100ML
9 INJECTION, SOLUTION INTRAVENOUS CONTINUOUS
Status: DISCONTINUED | OUTPATIENT
Start: 2017-03-13 | End: 2017-03-14 | Stop reason: HOSPADM

## 2017-03-13 RX ORDER — LABETALOL HYDROCHLORIDE 5 MG/ML
5 INJECTION, SOLUTION INTRAVENOUS
Status: DISCONTINUED | OUTPATIENT
Start: 2017-03-13 | End: 2017-03-13 | Stop reason: HOSPADM

## 2017-03-13 RX ORDER — LIDOCAINE HYDROCHLORIDE 10 MG/ML
1 INJECTION, SOLUTION EPIDURAL; INFILTRATION; INTRACAUDAL; PERINEURAL ONCE
Status: COMPLETED | OUTPATIENT
Start: 2017-03-13 | End: 2017-03-13

## 2017-03-13 RX ORDER — SODIUM CHLORIDE, SODIUM LACTATE, POTASSIUM CHLORIDE, CALCIUM CHLORIDE 600; 310; 30; 20 MG/100ML; MG/100ML; MG/100ML; MG/100ML
9 INJECTION, SOLUTION INTRAVENOUS CONTINUOUS
Status: DISCONTINUED | OUTPATIENT
Start: 2017-03-13 | End: 2017-03-13 | Stop reason: SDUPTHER

## 2017-03-13 RX ORDER — LIDOCAINE HYDROCHLORIDE 10 MG/ML
INJECTION, SOLUTION EPIDURAL; INFILTRATION; INTRACAUDAL; PERINEURAL AS NEEDED
Status: DISCONTINUED | OUTPATIENT
Start: 2017-03-13 | End: 2017-03-13 | Stop reason: SURG

## 2017-03-13 RX ORDER — ONDANSETRON 2 MG/ML
INJECTION INTRAMUSCULAR; INTRAVENOUS AS NEEDED
Status: DISCONTINUED | OUTPATIENT
Start: 2017-03-13 | End: 2017-03-13 | Stop reason: SURG

## 2017-03-13 RX ORDER — FENTANYL CITRATE 50 UG/ML
INJECTION, SOLUTION INTRAMUSCULAR; INTRAVENOUS AS NEEDED
Status: DISCONTINUED | OUTPATIENT
Start: 2017-03-13 | End: 2017-03-13 | Stop reason: SURG

## 2017-03-13 RX ORDER — LIDOCAINE HYDROCHLORIDE 10 MG/ML
1 INJECTION, SOLUTION EPIDURAL; INFILTRATION; INTRACAUDAL; PERINEURAL ONCE
Status: DISCONTINUED | OUTPATIENT
Start: 2017-03-13 | End: 2017-03-13 | Stop reason: HOSPADM

## 2017-03-13 RX ORDER — HYDROCODONE BITARTRATE AND ACETAMINOPHEN 5; 325 MG/1; MG/1
1 TABLET ORAL ONCE AS NEEDED
Status: DISCONTINUED | OUTPATIENT
Start: 2017-03-13 | End: 2017-03-13 | Stop reason: HOSPADM

## 2017-03-13 RX ORDER — CEFAZOLIN SODIUM 2 G/100ML
2 INJECTION, SOLUTION INTRAVENOUS ONCE
Status: DISCONTINUED | OUTPATIENT
Start: 2017-03-13 | End: 2017-03-13 | Stop reason: SDUPTHER

## 2017-03-13 RX ORDER — BUPIVACAINE HYDROCHLORIDE 5 MG/ML
INJECTION, SOLUTION EPIDURAL; INTRACAUDAL AS NEEDED
Status: DISCONTINUED | OUTPATIENT
Start: 2017-03-13 | End: 2017-03-13 | Stop reason: SURG

## 2017-03-13 RX ADMIN — LIDOCAINE HYDROCHLORIDE 0.2 ML: 10 INJECTION, SOLUTION EPIDURAL; INFILTRATION; INTRACAUDAL; PERINEURAL at 11:00

## 2017-03-13 RX ADMIN — HYDROMORPHONE HYDROCHLORIDE 0.5 MG: 1 INJECTION, SOLUTION INTRAMUSCULAR; INTRAVENOUS; SUBCUTANEOUS at 15:40

## 2017-03-13 RX ADMIN — HYDROMORPHONE HYDROCHLORIDE 0.5 MG: 1 INJECTION, SOLUTION INTRAMUSCULAR; INTRAVENOUS; SUBCUTANEOUS at 03:45

## 2017-03-13 RX ADMIN — SERTRALINE HYDROCHLORIDE 25 MG: 50 TABLET ORAL at 20:29

## 2017-03-13 RX ADMIN — FENTANYL CITRATE 50 MCG: 50 INJECTION, SOLUTION INTRAMUSCULAR; INTRAVENOUS at 13:30

## 2017-03-13 RX ADMIN — PROPOFOL 200 MG: 10 INJECTION, EMULSION INTRAVENOUS at 12:21

## 2017-03-13 RX ADMIN — LIDOCAINE HYDROCHLORIDE 50 MG: 10 INJECTION, SOLUTION EPIDURAL; INFILTRATION; INTRACAUDAL; PERINEURAL at 12:21

## 2017-03-13 RX ADMIN — Medication 6 ML/HR: at 14:50

## 2017-03-13 RX ADMIN — FAMOTIDINE 20 MG: 20 TABLET ORAL at 11:15

## 2017-03-13 RX ADMIN — PROMETHAZINE HYDROCHLORIDE 6.25 MG: 25 INJECTION INTRAMUSCULAR; INTRAVENOUS at 14:35

## 2017-03-13 RX ADMIN — BUPIVACAINE HYDROCHLORIDE 15 ML: 5 INJECTION, SOLUTION EPIDURAL; INTRACAUDAL; PERINEURAL at 14:34

## 2017-03-13 RX ADMIN — SODIUM CHLORIDE, POTASSIUM CHLORIDE, SODIUM LACTATE AND CALCIUM CHLORIDE 9 ML/HR: 600; 310; 30; 20 INJECTION, SOLUTION INTRAVENOUS at 11:00

## 2017-03-13 RX ADMIN — ONDANSETRON 4 MG: 2 INJECTION INTRAMUSCULAR; INTRAVENOUS at 13:42

## 2017-03-13 RX ADMIN — FENTANYL CITRATE 50 MCG: 50 INJECTION, SOLUTION INTRAMUSCULAR; INTRAVENOUS at 12:35

## 2017-03-13 RX ADMIN — HYDROMORPHONE HYDROCHLORIDE 0.5 MG: 1 INJECTION, SOLUTION INTRAMUSCULAR; INTRAVENOUS; SUBCUTANEOUS at 07:12

## 2017-03-13 RX ADMIN — FENTANYL CITRATE 50 MCG: 50 INJECTION, SOLUTION INTRAMUSCULAR; INTRAVENOUS at 12:21

## 2017-03-13 RX ADMIN — HYDROMORPHONE HYDROCHLORIDE 0.5 MG: 1 INJECTION, SOLUTION INTRAMUSCULAR; INTRAVENOUS; SUBCUTANEOUS at 14:20

## 2017-03-13 RX ADMIN — Medication 6 ML/HR: at 14:02

## 2017-03-13 RX ADMIN — HYDROMORPHONE HYDROCHLORIDE 0.5 MG: 1 INJECTION, SOLUTION INTRAMUSCULAR; INTRAVENOUS; SUBCUTANEOUS at 09:41

## 2017-03-13 RX ADMIN — DOCUSATE SODIUM 100 MG: 100 CAPSULE, LIQUID FILLED ORAL at 18:30

## 2017-03-13 RX ADMIN — ONDANSETRON 4 MG: 2 INJECTION INTRAMUSCULAR; INTRAVENOUS at 15:39

## 2017-03-13 RX ADMIN — FENTANYL CITRATE 50 MCG: 50 INJECTION, SOLUTION INTRAMUSCULAR; INTRAVENOUS at 12:42

## 2017-03-13 RX ADMIN — FENTANYL CITRATE 100 MCG: 50 INJECTION, SOLUTION INTRAMUSCULAR; INTRAVENOUS at 14:35

## 2017-03-13 RX ADMIN — SODIUM CHLORIDE, POTASSIUM CHLORIDE, SODIUM LACTATE AND CALCIUM CHLORIDE: 600; 310; 30; 20 INJECTION, SOLUTION INTRAVENOUS at 13:20

## 2017-03-13 NOTE — ANESTHESIA PROCEDURE NOTES
Peripheral Block    Patient location during procedure: pre-op  Start time: 3/13/2017 2:35 PM  Stop time: 3/13/2017 2:50 PM  Reason for block: at surgeon's request and post-op pain management  Performed by  CRNA: REN RODARTE  Assisted by: TANJA ORO  Preanesthetic Checklist  Completed: patient identified, site marked, surgical consent, pre-op evaluation, timeout performed, IV checked, risks and benefits discussed and monitors and equipment checked  Peripheral Block Prep:  Sterile barriers:cap, gloves, mask and sterile barriers  Prep: ChloraPrep  Patient monitoring: blood pressure monitoring, continuous pulse oximetry and EKG  Peripheral Procedure  Sedation:yes  Guidance:ultrasound guided  Images:still images obtained    Laterality:right  Block Type:infraclavicular  Injection Technique:catheter  Needle Type:Tuohy  Needle Gauge:18 G  Catheter Size:20 G (20g)  Medications  Preservative Free Saline:5ml  Local Injected:bupivacaine 0.5% Local Amount Injected:15mL  Post Assessment  Injection Assessment: negative aspiration for heme, no paresthesia on injection and incremental injection  Patient Tolerance:comfortable throughout block  Complications:no  Additional Notes  Procedure:                 CATHETER at skin: 8                                        Analgesia was achieved with 1% Lidocaine 2 ml for infiltration of skin       The affected upper extremity was adducted within the patients ROM.  The US probe was placed approximately at the distal inferior third of the clavicle in a cephalad to caudad orientation.  The brachial plexus, subclavian artery and vein where visualized and the patient was marked and sterile prep and drape where completed.   A 4 inch B-Perkins echogenic Touhy 360 degree needle was placed inferiorly to the clavicle in a caudad direction, in plane US technique.  The needle was visualized as it passed through Pectoralis Major and to the posterior aspect of the artery where LA was placed and  spread was visualized. Injection of LA was incremental, and negative aspiration every 5 MLs.  Injection pressure was also noted as minimal and patient denied pain on injection.   A 20 gauge catheter was then placed through the needle and positioned and location was confirmed with injection of LA.  The catheter insertions site was sealed with skin afix and steristrips secured the curled catheter.  A sterile CHG/Tegaderm was placed over the site and the LABELED catheter taped to skin.  Thank you

## 2017-03-13 NOTE — PROGRESS NOTES
Discharge Planning Assessment  [facility]     Patient Name: Kelly Strickland  MRN: [id number]  Today's Date: 3/13/2017    Admit Date: 3/10/2017          Discharge Needs Assessment       03/13/17 1650    Discharge Needs Assessment    Concerns To Be Addressed basic needs concerns;discharge planning concerns    Readmission Within The Last 30 Days no previous admission in last 30 days    Anticipated Changes Related to Illness inability to care for self    Equipment Currently Used at Home none    Equipment Needed After Discharge none    Transportation Available car;family or friend will provide    Discharge Disposition home or self-care    Discharge Contact Information if Applicable [place] ( spouse)            Discharge Plan       03/13/17 1651    Case Management/Social Work Plan    Plan Home    Patient/Family In Agreement With Plan yes    Additional Comments I met with Mrs Strickland to discuss d/c plan. Her plan is to return home. She lives with  and daughter who can assist with care. Prior to admit she was independent with all ADL's.She has insurance coverage for Rx meds. No d/c needs identifed , please advise if any arise.        Discharge Placement     No information found        Expected Discharge Date and Time     Expected Discharge Date Expected Discharge Time    Mar 15, 2017               Demographic Summary       03/13/17 1648    Referral Information    Admission Type observation    Arrived From home or self-care    Referral Source admission list    Reason For Consult discharge planning    Record Reviewed history and physical;medical record    Contact Information    Permission Granted to Share Information With ;family/designee    Primary Care Physician Information    Name Mary Antonio            Functional Status       03/13/17 1650    Functional Status Prior    Ambulation 0-->independent    Transferring 0-->independent    Toileting 0-->independent    Bathing 0-->independent     Dressing 0-->independent    Eating 0-->independent    Communication 0-->understands/communicates without difficulty    Swallowing 0-->swallows foods/liquids without difficulty    IADL    Medications independent    Meal Preparation independent    Housekeeping independent    Laundry independent    Shopping independent    Oral Care independent    Cognitive/Perceptual/Developmental    Current Mental Status/Cognitive Functioning no deficits noted    Recent Changes in Mental Status/Cognitive Functioning no changes    Employment/Financial    Employment/Finance Comments [employer] insurance            Psychosocial     None            Abuse/Neglect     None            Legal     None            Substance Abuse     None            Patient Forms     None          Sonja C Kellerman, RN

## 2017-03-13 NOTE — PROGRESS NOTES
"IM progress note      Kelly Strickland  [id number]  1955     LOS: 1 day     Attending: Percy Rhodes MD    Primary Care Provider: Mary Antonio DO      Chief Complaint/Reason for visit:    Chief Complaint   Patient presents with   • Arm Injury       Subjective   Feeling good. Pain is well controlled. Denies f/c/n/v/sob/cp.  ( Seen later after surgery, tolerated well. Good pain control with nerve block)wy  Objective     Vital Signs  Blood pressure 132/62, pulse 80, temperature 98.1 °F (36.7 °C), resp. rate 16, height 65\" (165.1 cm), weight 158 lb (71.7 kg), SpO2 94 %.  Temp (24hrs), Av.3 °F (36.8 °C), Min:98.1 °F (36.7 °C), Max:98.4 °F (36.9 °C)      Intake/Output:    Intake/Output Summary (Last 24 hours) at 17 0918  Last data filed at 17 0857   Gross per 24 hour   Intake    360 ml   Output    450 ml   Net    -90 ml       Physical Exam:     General Appearance:    Alert, cooperative, in no acute distress   Head:    Normocephalic, without obvious abnormality, atraumatic    Lungs:     Normal effort, symmetric chest rise, no crepitus, clear to      auscultation bilaterally                  Heart:    Regular rhythm and normal rate, normal S1 and S2    Abdomen:     Normal bowel sounds   Extremities:   No clubbing, cyanosis or edema.  No deformities.    RUE in sling and splint. Moves hand well. Cap refill normal.   Pulses:   Pulses palpable and equal bilaterally   Skin:   No bleeding, bruising or rash          Results Review:     I reviewed the patient's new clinical results.     Results from last 7 days  Lab Units 17  0513 03/10/17  0955   WBC 10*3/mm3 6.77 12.19*   HEMOGLOBIN g/dL 12.3 14.0   HEMATOCRIT % 38.3 42.1   PLATELETS 10*3/mm3 261 276       Results from last 7 days  Lab Units 17  0513 03/10/17  0955   SODIUM mmol/L 137 141   POTASSIUM mmol/L 3.8 4.3   CHLORIDE mmol/L 104 110*   TOTAL CO2 mmol/L 27.0 29.0   BUN mg/dL 10 12   CREATININE mg/dL 0.60 0.70   CALCIUM mg/dL " 8.9 9.0   GLUCOSE mg/dL 91 103*     I reviewed the patient's new imaging including images and reports.    All medications reviewed.     docusate sodium 100 mg Oral BID   sertraline 25 mg Oral Nightly       hydrALAZINE 10 mg Q6H PRN   HYDROcodone-acetaminophen 1 tablet Q4H PRN   HYDROmorphone 0.5 mg Q2H PRN   ondansetron 4 mg Q6H PRN   oxyCODONE-acetaminophen 1 tablet Q4H PRN   pneumococcal polysaccharide 23-valent 0.5 mL During Hospitalization   sodium chloride 500 mL TID PRN   sodium chloride 1-10 mL PRN       Assessment/Plan   Active Problems:    Closed fracture of humerus, right.     GERD (gastroesophageal reflux disease)    Osteopenia    ORIF proximal humerus fracture, right 3/13/17     Plan  1. Encouraged ambulation  2. Pain control-prns  3. IS-encouraged use  4. DVT proph- [facility]   5. Bowel regimen  6. OR today by Dr. Jimenes.     Sheri More, WILFRID  03/13/17  9:18 AM   Seen and examined by me. Agree with above. Discussed with patient. Discussed possible discharge home tomorrow.

## 2017-03-13 NOTE — ANESTHESIA PREPROCEDURE EVALUATION
Anesthesia Evaluation     Patient summary reviewed and Nursing notes reviewed      Airway   Mallampati: I  TM distance: <3 FB  Neck ROM: full  no difficulty expected  Dental - normal exam     Pulmonary - normal exam   (+) COPD,   Cardiovascular - negative cardio ROS and normal exam        Neuro/Psych- negative ROS  GI/Hepatic/Renal/Endo - negative ROS     Musculoskeletal (-) negative ROS    Abdominal  - normal exam    Bowel sounds: normal.   Substance History - negative use     OB/GYN negative ob/gyn ROS         Other                                    Anesthesia Plan    ASA 3     general and regional     intravenous induction   Anesthetic plan and risks discussed with patient.

## 2017-03-13 NOTE — BRIEF OP NOTE
HUMERUS PROXIMAL OPEN REDUCTION INTERNAL FIXATION  Procedure Note    Kelly Strickland  3/10/2017 - 3/13/2017    Pre-op Diagnosis:   * No pre-op diagnosis entered *    Post-op Diagnosis:     Post-Op Diagnosis Codes:     * Fracture of humeral shaft, right, closed [S42.301A]    Procedure/CPT® Codes:  DE OPEN FIXATN MID HUMERUS FRACTURE [67734]    Procedure(s):  OPEN REDUCTION INTERNAL FIXATION RIGHT HUMERUS    Surgeon(s):  Ryan Jimenes MD    Anesthesia: General with Block    Staff:   Circulator: Louise Benjamin RN; Gosia Wall RN  Scrub Person: Geraldine Brooks  Vendor Representative: Maksim Kaplan  Assistant: PATRICIA Chase  Orientee: Darlyn Cross RN    Estimated Blood Loss: 260 mL  Urine Voided: * No values recorded between 3/13/2017 12:14 PM and 3/13/2017  2:01 PM *    Specimens:                * No specimens in log *      Drains:           Findings: per dictation    Complications: none      Ryan Jimenes MD     Date: 3/13/2017  Time: 2:01 PM

## 2017-03-13 NOTE — ANESTHESIA PROCEDURE NOTES
Airway  Urgency: elective    Date/Time: 3/13/2017 2:13 PM    General Information and Staff    Patient location during procedure: OR  Anesthesiologist: TANJA YOUNGBLOOD  CRNA: CORNELIO DIA    Indications and Patient Condition  Indications for airway management: airway protection    Preoxygenated: yes  Mask difficulty assessment: 0 - not attempted    Final Airway Details  Final airway type: supraglottic airway      Successful airway: classic  Size 4    Number of attempts at approach: 1    Additional Comments  LMA placed atraumatically, lips, teeth, gums as preop. No leak audible, TV appropriate for weight.

## 2017-03-13 NOTE — PERIOPERATIVE NURSING NOTE
Patient arrived from floor with IV site (18 g L AC) redness and hardening. IV was d/c per policy per AMINTA Hannah RN. A small amount of whitish pus drained from site. Site warm and slightly tender per pt. Pt temp 99.0 today in preop. Redness was marked with skin marker. Site was cleaned and covered with clean dressing per AMINTA Hannah RN. These findings were discussed with Dr Jimenes in pre-op. No further orders at this time.

## 2017-03-13 NOTE — ANESTHESIA POSTPROCEDURE EVALUATION
Patient: Kelly Strickland    Procedure Summary     Date Anesthesia Start Anesthesia Stop Room / Location    03/13/17 1214  [facility] / [facility]       Procedure Diagnosis Surgeon Provider    OPEN REDUCTION INTERNAL FIXATION RIGHT HUMERUS (Right Arm Upper) Fracture of humeral shaft, right, closed MD Cleve Roca MD          Anesthesia Type: general, regional  Last vitals  /64 (03/13/17 1420)    Temp 98.1 °F (36.7 °C) (03/13/17 1420)    Pulse 83 (03/13/17 1420)   Resp 16 (03/13/17 1420)    SpO2        Post Anesthesia Care and Evaluation    Patient location during evaluation: PACU  Patient participation: complete - patient participated  Level of consciousness: awake and responsive to verbal stimuli  Pain score: 2  Pain management: adequate  Airway patency: patent  Anesthetic complications: No anesthetic complications    Cardiovascular status: acceptable  Respiratory status: acceptable  Hydration status: acceptable    Comments: Pt awake and responsive. SV. VSS. Report to RN. Patient Vitals in the past 24 hrs:  03/13/17 1420, BP:128/64, Temp:98.1 °F (36.7 °C), Temp src:Temporal Art, Pulse:83, Resp:16  03/13/17 1058, BP:148/81, Temp:99 °F (37.2 °C), Temp src:Temporal Art, Pulse:92, Resp:18, SpO2:94 %  03/13/17 0800, BP:132/62, Temp:98.1 °F (36.7 °C), Pulse:80, Resp:16, SpO2:94 %  03/13/17 0354, BP:115/56, Temp:98.4 °F (36.9 °C), Temp src:Tympanic, Pulse:81, SpO2:92 %  03/12/17 2041, BP:123/57, Temp:98.3 °F (36.8 °C), Temp src:Tympanic, Pulse:81, SpO2:94 %  03/12/17 1538, BP:137/62, Temp:98.2 °F (36.8 °C), Pulse:98, Resp:16, SpO2:97 %  133/78. p 72. r 16. t 98.1      Post 120/83. Pulse 78. Sat 99. resp 12 temp 98

## 2017-03-13 NOTE — PLAN OF CARE
Problem: Fall Risk (Adult)  Goal: Identify Related Risk Factors and Signs and Symptoms  Outcome: Ongoing (interventions implemented as appropriate)    03/13/17 0433   Fall Risk   Fall Risk: Related Risk Factors history of falls;environment unfamiliar   Fall Risk: Signs and Symptoms presence of risk factors       Goal: Absence of Falls  Outcome: Ongoing (interventions implemented as appropriate)    03/13/17 0433   Fall Risk (Adult)   Absence of Falls making progress toward outcome         Problem: Pain, Acute (Adult)  Goal: Identify Related Risk Factors and Signs and Symptoms  Outcome: Ongoing (interventions implemented as appropriate)    03/13/17 0433   Pain, Acute   Related Risk Factors (Acute Pain) trauma   Signs and Symptoms (Acute Pain) verbalization of pain descriptors;guarding/abnormal posturing/positioning;BADLs/IADLs reluctance/inability to perform       Goal: Acceptable Pain Control/Comfort Level  Outcome: Ongoing (interventions implemented as appropriate)    03/13/17 0433   Pain, Acute (Adult)   Acceptable Pain Control/Comfort Level making progress toward outcome         Problem: Patient Care Overview (Adult)  Goal: Plan of Care Review  Outcome: Ongoing (interventions implemented as appropriate)    03/13/17 0433   Coping/Psychosocial Response Interventions   Plan Of Care Reviewed With patient   Patient Care Overview   Progress no change   Outcome Evaluation   Outcome Summary/Follow up Plan Patient has been NPO since midnight for surgery in the am, patients pain managed with PRN pain medications and ice packs.

## 2017-03-14 ENCOUNTER — ANESTHESIA EVENT (OUTPATIENT)
Dept: INPATIENT UNIT | Facility: HOSPITAL | Age: 62
End: 2017-03-14

## 2017-03-14 ENCOUNTER — ANESTHESIA (OUTPATIENT)
Dept: INPATIENT UNIT | Facility: HOSPITAL | Age: 62
End: 2017-03-14

## 2017-03-14 VITALS
HEIGHT: 65 IN | OXYGEN SATURATION: 94 % | SYSTOLIC BLOOD PRESSURE: 141 MMHG | HEART RATE: 106 BPM | BODY MASS INDEX: 26.33 KG/M2 | RESPIRATION RATE: 16 BRPM | DIASTOLIC BLOOD PRESSURE: 67 MMHG | WEIGHT: 158 LBS | TEMPERATURE: 97.6 F

## 2017-03-14 PROBLEM — Z87.81 S/P ORIF (OPEN REDUCTION INTERNAL FIXATION) FRACTURE: Status: ACTIVE | Noted: 2017-03-14

## 2017-03-14 PROBLEM — G89.18 ACUTE POST-OPERATIVE PAIN: Status: ACTIVE | Noted: 2017-03-14

## 2017-03-14 PROBLEM — Z98.890 S/P ORIF (OPEN REDUCTION INTERNAL FIXATION) FRACTURE: Status: ACTIVE | Noted: 2017-03-14

## 2017-03-14 PROCEDURE — 97161 PT EVAL LOW COMPLEX 20 MIN: CPT

## 2017-03-14 PROCEDURE — G0378 HOSPITAL OBSERVATION PER HR: HCPCS

## 2017-03-14 PROCEDURE — G8987 SELF CARE CURRENT STATUS: HCPCS

## 2017-03-14 PROCEDURE — G8979 MOBILITY GOAL STATUS: HCPCS

## 2017-03-14 PROCEDURE — 25010000002 ONDANSETRON PER 1 MG: Performed by: INTERNAL MEDICINE

## 2017-03-14 PROCEDURE — 25010000002 HYDROMORPHONE PER 4 MG: Performed by: INTERNAL MEDICINE

## 2017-03-14 PROCEDURE — 97530 THERAPEUTIC ACTIVITIES: CPT

## 2017-03-14 PROCEDURE — G8988 SELF CARE GOAL STATUS: HCPCS

## 2017-03-14 PROCEDURE — G8980 MOBILITY D/C STATUS: HCPCS

## 2017-03-14 PROCEDURE — G8989 SELF CARE D/C STATUS: HCPCS

## 2017-03-14 PROCEDURE — 97165 OT EVAL LOW COMPLEX 30 MIN: CPT

## 2017-03-14 PROCEDURE — G8978 MOBILITY CURRENT STATUS: HCPCS

## 2017-03-14 RX ORDER — BUPRENORPHINE HYDROCHLORIDE 0.32 MG/ML
INJECTION INTRAMUSCULAR; INTRAVENOUS AS NEEDED
Status: SHIPPED | OUTPATIENT
Start: 2017-03-14

## 2017-03-14 RX ORDER — BUPIVACAINE HYDROCHLORIDE 2.5 MG/ML
INJECTION, SOLUTION EPIDURAL; INFILTRATION; INTRACAUDAL AS NEEDED
Status: SHIPPED | OUTPATIENT
Start: 2017-03-14

## 2017-03-14 RX ORDER — ONDANSETRON 4 MG/1
4 TABLET, FILM COATED ORAL EVERY 8 HOURS PRN
Qty: 15 TABLET | Refills: 0 | Status: SHIPPED | OUTPATIENT
Start: 2017-03-14 | End: 2018-02-08

## 2017-03-14 RX ORDER — PSEUDOEPHEDRINE HCL 30 MG
100 TABLET ORAL 2 TIMES DAILY
Qty: 60 CAPSULE | Refills: 0 | Status: SHIPPED | OUTPATIENT
Start: 2017-03-14 | End: 2018-02-08

## 2017-03-14 RX ORDER — OXYCODONE HYDROCHLORIDE AND ACETAMINOPHEN 5; 325 MG/1; MG/1
2 TABLET ORAL EVERY 4 HOURS PRN
Status: DISCONTINUED | OUTPATIENT
Start: 2017-03-14 | End: 2017-03-14

## 2017-03-14 RX ORDER — ROPIVACAINE HYDROCHLORIDE 2 MG/ML
4 INJECTION, SOLUTION EPIDURAL; INFILTRATION CONTINUOUS
Status: DISCONTINUED | OUTPATIENT
Start: 2017-03-14 | End: 2017-03-14 | Stop reason: HOSPADM

## 2017-03-14 RX ORDER — OXYCODONE AND ACETAMINOPHEN 10; 325 MG/1; MG/1
1 TABLET ORAL EVERY 4 HOURS PRN
Qty: 50 TABLET | Refills: 0 | Status: SHIPPED | OUTPATIENT
Start: 2017-03-14 | End: 2017-03-24

## 2017-03-14 RX ORDER — DEXAMETHASONE SODIUM PHOSPHATE 10 MG/ML
INJECTION, SOLUTION INTRAMUSCULAR; INTRAVENOUS AS NEEDED
Status: SHIPPED | OUTPATIENT
Start: 2017-03-14

## 2017-03-14 RX ORDER — OXYCODONE AND ACETAMINOPHEN 10; 325 MG/1; MG/1
2 TABLET ORAL EVERY 4 HOURS PRN
Status: DISCONTINUED | OUTPATIENT
Start: 2017-03-14 | End: 2017-03-14 | Stop reason: HOSPADM

## 2017-03-14 RX ORDER — OXYCODONE AND ACETAMINOPHEN 10; 325 MG/1; MG/1
1 TABLET ORAL EVERY 6 HOURS PRN
Refills: 0
Start: 2017-03-14 | End: 2017-03-14 | Stop reason: HOSPADM

## 2017-03-14 RX ORDER — ROPIVACAINE HYDROCHLORIDE 2 MG/ML
4 INJECTION, SOLUTION EPIDURAL; INFILTRATION CONTINUOUS
Start: 2017-03-14 | End: 2017-06-29

## 2017-03-14 RX ADMIN — HYDROMORPHONE HYDROCHLORIDE 0.5 MG: 1 INJECTION, SOLUTION INTRAMUSCULAR; INTRAVENOUS; SUBCUTANEOUS at 05:29

## 2017-03-14 RX ADMIN — OXYCODONE AND ACETAMINOPHEN 1 TABLET: 5; 325 TABLET ORAL at 02:44

## 2017-03-14 RX ADMIN — BUPRENORPHINE HYDROCHLORIDE 0.3 MG: 0.32 INJECTION INTRAMUSCULAR; INTRAVENOUS at 12:00

## 2017-03-14 RX ADMIN — OXYCODONE AND ACETAMINOPHEN 2 TABLET: 5; 325 TABLET ORAL at 06:57

## 2017-03-14 RX ADMIN — DOCUSATE SODIUM 100 MG: 100 CAPSULE, LIQUID FILLED ORAL at 10:59

## 2017-03-14 RX ADMIN — DEXAMETHASONE SODIUM PHOSPHATE 2 MG: 10 INJECTION, SOLUTION INTRAMUSCULAR; INTRAVENOUS at 12:00

## 2017-03-14 RX ADMIN — HYDROCODONE BITARTRATE AND ACETAMINOPHEN 1 TABLET: 5; 325 TABLET ORAL at 14:27

## 2017-03-14 RX ADMIN — BUPIVACAINE HYDROCHLORIDE 15 ML: 2.5 INJECTION, SOLUTION EPIDURAL; INFILTRATION; INTRACAUDAL at 12:00

## 2017-03-14 RX ADMIN — ONDANSETRON 4 MG: 2 INJECTION INTRAMUSCULAR; INTRAVENOUS at 02:39

## 2017-03-14 NOTE — NURSING NOTE
Acute Pain Service:  On-Q teaching completed with patient and spouse.  Video demonstration, handout and bracelet provided with [facility] on call central phone number.  Instructed to call with any questions or concerns.  Patient verbalized understanding.  Service will continue to follow until catheter DC'd.  Please contact patient at [phone] if needed.

## 2017-03-14 NOTE — PLAN OF CARE
Problem: Fall Risk (Adult)  Goal: Identify Related Risk Factors and Signs and Symptoms  Outcome: Ongoing (interventions implemented as appropriate)    03/14/17 0413   Fall Risk   Fall Risk: Related Risk Factors history of falls;environment unfamiliar   Fall Risk: Signs and Symptoms presence of risk factors       Goal: Absence of Falls  Outcome: Ongoing (interventions implemented as appropriate)    03/14/17 0413   Fall Risk (Adult)   Absence of Falls making progress toward outcome         Problem: Pain, Acute (Adult)  Goal: Identify Related Risk Factors and Signs and Symptoms  Outcome: Ongoing (interventions implemented as appropriate)    03/14/17 0413   Pain, Acute   Related Risk Factors (Acute Pain) surgery   Signs and Symptoms (Acute Pain) BADLs/IADLs reluctance/inability to perform;facial mask of pain/grimace;guarding/abnormal posturing/positioning;moaning;verbalization of pain descriptors;pacing/restlessness       Goal: Acceptable Pain Control/Comfort Level  Outcome: Ongoing (interventions implemented as appropriate)    Problem: Patient Care Overview (Adult)  Goal: Plan of Care Review  Outcome: Ongoing (interventions implemented as appropriate)    03/14/17 0413   Coping/Psychosocial Response Interventions   Plan Of Care Reviewed With patient   Patient Care Overview   Progress improving   Outcome Evaluation   Outcome Summary/Follow up Plan Patients pain was well controlled with On Q Ball, during the night she stated the numbness was uncomfortable turned the On Q ball down to 4 to see if that would help with senstaion and controlling pain. Patients pain was increased so we turned the ON q Ball back up to 6 and PRN pain medication was given. Patient stated she is feeling better at this time.

## 2017-03-14 NOTE — THERAPY DISCHARGE NOTE
Acute Care - Physical Therapy Initial Eval/Discharge   [place]     Patient Name: Kelly Strickland  : 1955  MRN: [id number]  Today's Date: 3/14/2017   Onset of Illness/Injury or Date of Surgery Date: 17  Date of Referral to PT: 17  Referring Physician: MD Yudy      Admit Date: 3/10/2017    Visit Dx:    ICD-10-CM ICD-9-CM   1. Other closed displaced fracture of proximal end of right humerus, initial encounter S42.291A 812.09   2. Impaired functional mobility, balance, gait, and endurance Z74.09 V49.89     Patient Active Problem List   Diagnosis   • Disorder of bilirubin excretion   • Elevated blood-pressure reading without diagnosis of hypertension   • Herpes zoster   • Carotid bruit   • Closed fracture of humerus   • GERD (gastroesophageal reflux disease)   • Osteopenia   • S/P ORIF right humeral shaft fracture    • Acute post-operative pain     Past Medical History   Diagnosis Date   • Acid reflux    • Basal cell carcinoma of skin    • Herpes zoster    • Migraine    • Nephrolithiasis    • Osteopenia    • Thoracic vertebral fracture    • Vitamin D deficiency      Past Surgical History   Procedure Laterality Date   • Cholecystectomy     • Mohs surgery     • Cystoscopy w/ laser lithotripsy     • Breast biopsy     • Pr open treatment prox humeral fracture Right 3/13/2017     Procedure: OPEN REDUCTION INTERNAL FIXATION RIGHT HUMERUS;  Surgeon: Ryan Jimenes MD;  Location: [facility];  Service: Orthopedics          PT ASSESSMENT (last 72 hours)      PT Evaluation       17 1454 17    Rehab Evaluation    Document Type evaluation;discharge summary  -MJ     Subjective Information agree to therapy;complains of;pain  -MJ     Patient Effort, Rehab Treatment good  -MJ     Symptoms Noted During/After Treatment none  -MJ     General Information    Patient Profile Review yes  -MJ     Onset of Illness/Injury or Date of Surgery Date 17  -MJ     Referring Physician MD Yudy  -MJ      General Observations Pt sitting EOB, sling to R UE, interscalene nerve catheter.  present  -MJ     Pertinent History Of Current Problem Pt presents for surgical management of R humerus fracture after a fall  -MJ     Precautions/Limitations brace on when up;shoulder precautions;other (see comments)   sling to R UE; nerve catheter  -MJ     Prior Level of Function independent:;all household mobility;community mobility;gait;transfer;bed mobility  -MJ     Equipment Currently Used at Home none  -MJ     Plans/Goals Discussed With patient and family;agreed upon  -MJ     Risks Reviewed patient and family:;LOB;increased discomfort  -MJ     Benefits Reviewed patient and family:;improve function;increase independence;increase strength;increase balance;decrease pain  -MJ     Barriers to Rehab none identified  -MJ     Living Environment    Lives With spouse  -MJ     Living Arrangements house  -MJ     Home Accessibility stairs to enter home   walk-in shower  -MJ     Number of Stairs to Enter Home 1  -MJ     Clinical Impression    Date of Referral to PT 03/14/17  -     PT Diagnosis s/p R humerus ORIF  -     Criteria for Skilled Therapeutic Interventions Met no;no problems identified which require skilled intervention  -MJ     Pain Assessment    Pain Assessment 0-10  -MJ     Pain Score 4  -MJ     Post Pain Score 4  -MJ     Pain Type Acute pain  -MJ     Pain Location Arm  -MJ     Pain Orientation Right  -MJ     Pain Intervention(s) Repositioned;Ambulation/increased activity  -     Cognitive Assessment/Intervention    Current Cognitive/Communication Assessment functional  -     Orientation Status oriented x 4  -MJ     Follows Commands/Answers Questions 100% of the time;able to follow multi-step instructions  -MJ     Personal Safety WNL/WFL  -MJ     ROM (Range of Motion)    General ROM no range of motion deficits identified  -MJ     General ROM Detail for B-L LE, defer UE to OT  -MJ     MMT (Manual Muscle Testing)     General MMT Assessment no strength deficits identified  -MJ     General MMT Assessment Detail for B-L LE, defer UE to OT  -MJ     Muscle Tone Assessment    Muscle Tone Assessment  RUE;LUE;Bilateral Lower Extremities  -AB    LUE Muscle Tone Assessment  associated movements noted  -AB    RUE Muscle Tone Assessment  severely decreased tone  -AB    Bilateral Lower Extremities Muscle Tone Assessment  associated movements noted  -AB    Bed Mobility, Assessment/Treatment    Bed Mob, Supine to Sit, [place] not tested   Pt sitting EOB  -MJ     Bed Mob, Sit to Supine, [place] not tested   Pt sitting EOB  -MJ     Transfer Assessment/Treatment    Transfers, Sit-Stand [place] independent  -MJ     Transfers, Stand-Sit [place] independent  -MJ     Gait Assessment/Treatment    Gait, [place] Level supervision required  -MJ     Gait, Distance (Feet) 200  -MJ     Gait, Gait Pattern Analysis swing-through gait  -MJ     Gait, Gait Deviations leydi decreased  -MJ     Gait, Impairments pain  -MJ     Gait, Comment Pt demo step through gait pattern. Pt initially supports R UE with L UE despite sling, with cues to allow sling to support arm pt's balance improved.   -MJ     Positioning and Restraints    Pre-Treatment Position in bed  -MJ     Post Treatment Position bed  -MJ     In Bed notified nsg;sitting EOB;call light within reach;encouraged to call for assist;with family/caregiver;with OT  -MJ       03/13/17 1650 03/13/17 0730    General Information    Equipment Currently Used at Home none  -SK     Living Environment    Transportation Available car;family or friend will provide  -SK     Muscle Tone Assessment    Muscle Tone Assessment  Bilateral Lower Extremities;RUE;LUE  -CC    LUE Muscle Tone Assessment  associated movements noted  -CC    RUE Muscle Tone Assessment  severely decreased tone  -      03/12/17 2054 03/12/17 1904    Muscle Tone Assessment    Muscle Tone Assessment Bilateral Lower  Extremities;RUE;LUE  -AB LLE;RLE;Bilateral Lower Extremities  -PM    LUE Muscle Tone Assessment associated movements noted  -AB     RUE Muscle Tone Assessment severely decreased tone  -AB     LLE Muscle Tone Assessment  associated movements noted  -PM    RLE Muscle Tone Assessment  severely decreased tone  -PM    Bilateral Lower Extremities Muscle Tone Assessment  associated movements noted  -PM      03/12/17 1805 03/12/17 1704    Muscle Tone Assessment    Muscle Tone Assessment LLE;RLE;Bilateral Lower Extremities  -PM LLE;RLE;Bilateral Lower Extremities  -PM    LLE Muscle Tone Assessment associated movements noted  -PM associated movements noted  -PM    RLE Muscle Tone Assessment severely decreased tone  -PM severely decreased tone  -PM    Bilateral Lower Extremities Muscle Tone Assessment associated movements noted  -PM associated movements noted  -PM      03/12/17 1607 03/12/17 1438    Muscle Tone Assessment    Muscle Tone Assessment LLE;RLE;Bilateral Lower Extremities  -PM LLE;RLE;Bilateral Lower Extremities  -PM    LLE Muscle Tone Assessment associated movements noted  -PM associated movements noted  -PM    RLE Muscle Tone Assessment severely decreased tone  -PM severely decreased tone  -PM    Bilateral Lower Extremities Muscle Tone Assessment associated movements noted  -PM associated movements noted  -PM      03/12/17 1341 03/12/17 1250    Muscle Tone Assessment    Muscle Tone Assessment LLE;RLE;Bilateral Lower Extremities  -PM LLE;RLE;Bilateral Lower Extremities  -PM    LLE Muscle Tone Assessment associated movements noted  -PM associated movements noted  -PM    RLE Muscle Tone Assessment severely decreased tone  -PM severely decreased tone  -PM    Bilateral Lower Extremities Muscle Tone Assessment associated movements noted  -PM associated movements noted  -PM      03/12/17 1041 03/12/17 0900    Muscle Tone Assessment    Muscle Tone Assessment LLE;RLE;Bilateral Lower Extremities  -PM LLE;RLE;Bilateral Lower  Extremities  -PM    LLE Muscle Tone Assessment associated movements noted  -PM associated movements noted  -PM    RLE Muscle Tone Assessment severely decreased tone  -PM severely decreased tone  -PM    Bilateral Lower Extremities Muscle Tone Assessment associated movements noted  -PM associated movements noted  -PM      03/12/17 0314       General Information    Equipment Currently Used at Home none  -JR     Living Environment    Transportation Available car  -JR       User Key  (r) = Recorded By, (t) = Taken By, (c) = Cosigned By    Initials Name Provider Type    JAN Rivas, PT Physical Therapist    SK Sonja C Kellerman, RN Case Manager    AB Serenity Melgar, RN Registered Nurse    CC Winnie Olguin, RN Registered Nurse    PM Krysta Arreola LPN Licensed Nurse     Louise Reed, DEANA Registered Nurse          Physical Therapy Education     Title: PT OT SLP Therapies (Done)     Topic: Physical Therapy (Done)     Point: Mobility training (Done)    Learning Progress Summary    Learner Readiness Method Response Comment Documented by Status   Patient Acceptance E,D [facility] 03/14/17 1537 Done   Significant Other Acceptance E,D [facility] 03/14/17 1537 Done               Point: Body mechanics (Done)    Learning Progress Summary    Learner Readiness Method Response Comment Documented by Status   Patient Acceptance E,D [facility] 03/14/17 1537 Done   Significant Other Acceptance E,D [facility] 03/14/17 1537 Done               Point: Precautions (Done)    Learning Progress Summary    Learner Readiness Method Response Comment Documented by Status   Patient Acceptance E,D [facility] 03/14/17 1537 Done   Significant Other Acceptance E,D [facility] 03/14/17 1537 Done                      User Key     Initials Effective Dates Name Provider Type Discipline     03/14/16 -  Gregorio Rivas, PT Physical Therapist PT                PT Recommendation and Plan  Anticipated Discharge Disposition: home with assist  PT Frequency: evaluation only  Plan  of Care Review  Plan Of Care Reviewed With: patient  Outcome Summary/Follow up Plan: PT eval completed. Pt demo good balance, is independent with functional mobility, and is discharging home with assist. No goals established as discharge occurs same day as evaluation.               Outcome Measures       03/14/17 1454          How much help from another person do you currently need...    Turning from your back to your side while in flat bed without using bedrails? 4  -MJ      Moving from lying on back to sitting on the side of a flat bed without bedrails? 4  -MJ      Moving to and from a bed to a chair (including a wheelchair)? 4  -MJ      Standing up from a chair using your arms (e.g., wheelchair, bedside chair)? 4  -MJ      Climbing 3-5 steps with a railing? 3  -MJ      To walk in hospital room? 3  -MJ      AM-PAC 6 Clicks Score 22  -MJ      Functional Assessment    Outcome Measure Options AM-PAC 6 Clicks Basic Mobility (PT)  -        User Key  (r) = Recorded By, (t) = Taken By, (c) = Cosigned By    Initials Name Provider Type    JAN Rivas PT Physical Therapist           Time Calculation:         PT Charges       03/14/17 1541          Time Calculation    Start Time 1454  -MJ      PT Received On 03/14/17  -      Time Calculation- PT    Total Timed Code Minutes- PT 0 minute(s)  -        User Key  (r) = Recorded By, (t) = Taken By, (c) = Cosigned By    Initials Name Provider Type    JAN Rivas PT Physical Therapist          Therapy Charges for Today     Code Description Service Date Service Provider Modifiers Qty    10017083014 HC PT MOBILITY CURRENT 3/14/2017 Gregorio Rivas PT GP, CJ 1    94500887266 HC PT MOBILITY PROJECTED 3/14/2017 Gregorio Rivas PT GP, CJ 1    30217148896 HC PT MOBILITY DISCHARGE 3/14/2017 Gregorio Rivas PT GP, CJ 1    85704779957 HC PT EVAL LOW COMPLEXITY 3 3/14/2017 Gregorio Rivas PT GP 1          PT G-Codes  PT Professional Judgement Used?: Yes  Outcome Measure Options: AM-PAC 6  Clicks Basic Mobility (PT)  Score: 22  Functional Limitation: Mobility: Walking and moving around  Mobility: Walking and Moving Around Current Status (): At least 20 percent but less than 40 percent impaired, limited or restricted  Mobility: Walking and Moving Around Goal Status (): At least 20 percent but less than 40 percent impaired, limited or restricted  Mobility: Walking and Moving Around Discharge Status (): At least 20 percent but less than 40 percent impaired, limited or restricted    PT Discharge Summary  Anticipated Discharge Disposition: home with assist  Reason for Discharge: Discharge from facility  Outcomes Achieved: Discharge from facility occurred on same date as evluation  Discharge Destination: Home with assist    Gregorio Rivas, PT  3/14/2017

## 2017-03-14 NOTE — PROGRESS NOTES
[facility]    Acute pain service Inpatient Progress Note    Patient Name: Kelly Strickland  :  1955  MRN:  [id number]        Acute Pain  Service Inpatient Progress Note:    Analgesia:Poor  Pain Score:9/10  LOC: alert and awake  Resp Status: room air  Cardiac: VS stable  Side Effects:None  Catheter Site:clean  Cath type: peripheral nerve cath(MOOG pump)  Infusion rate: 8ml/hr  Comments: Patient still having a lot of pain in the upper arm.  We decided to reblock her and remove infraclavicular catheter and replace it with an interscalene catheter.  I bolused her infraclavicular catheter to help with lower incision pain and placed interscalene cath.

## 2017-03-14 NOTE — ANESTHESIA PROCEDURE NOTES
Peripheral Block    Patient location during procedure: pre-op  Start time: 3/14/2017 12:00 PM  Stop time: 3/14/2017 12:17 PM  Reason for block: at surgeon's request and post-op pain management  Performed by  CRNA: REN RODARTE  Assisted by: SRINIVAS GARCIA  Preanesthetic Checklist  Completed: patient identified, site marked, surgical consent, pre-op evaluation, timeout performed, IV checked, risks and benefits discussed and monitors and equipment checked  Peripheral Block Prep:  Sterile barriers:cap, gloves, mask and sterile barriers  Prep: ChloraPrep  Patient monitoring: blood pressure monitoring, continuous pulse oximetry and EKG  Peripheral Procedure  Sedation:no  Guidance:ultrasound guided  Images:still images obtained    Laterality:right  Block Type:interscalene  Injection Technique:catheter  Needle Type:Tuohy  Needle Gauge:18 G  Catheter Size:20 G (20g)  Medications  Preservative Free Saline:5ml  Comment:Buprenex 300 mcg  PF Decadron 2mg  Local Injected:bupivacaine 0.25% without epinephrine Local Amount Injected:15mL  Post Assessment  Injection Assessment: negative aspiration for heme, no paresthesia on injection and incremental injection  Patient Tolerance:comfortable throughout block  Complications:no  Additional Notes  Procedure:                Catheter at skin-7                                      Anesthesia was provide by 1% Lidocaine 2 ml for infiltration of skin      The pt was placed in semifowlers position with a slight tilt of the thorax contralateral to the insertion site.  The Insertion Site was prepped and draped in sterile fashion.  The skin was anesthetized with Lidocaine 1% 1ml injection utilizing a 25g needle.  Utilizing ultrasound guidance, a BBraun 2 inch 18 g Contiplex echogenic touhy needle was advanced in-plane.  Hydro dissection of tissue was achieved with Normal saline. Major vessels(carotid and Internal Jugular) where visualized as the brachial plexus was approached at the  approximate level of C-7/ T-1.  Cervical 5 and Branches of Cervical 6 nerve roots where visualized and the needle tip was placed posterior at the level of C-6 roots.  LA spread was visualized and injection was made incrementally every 5 mls with aspiration. Injection pressure was normal or little, there was no intraneural injection, no vascular injection.      The BBraun 20 g wire stylet  catheter was then placed under US guidance on the posterior aspect of the Brachial Plexus.  Location of catheter was confirmed with NS injection visualized with US . The tuohy was then removed and the skin was sealed with Skin AFix at catheter insertion site.  Skin was prepped with mastisol and the labeled catheter  was secured with steristrips and a CHG tegaderm. Thank You.

## 2017-03-14 NOTE — THERAPY DISCHARGE NOTE
Acute Care - Occupational Therapy Initial Eval/Discharge  [facility]     Patient Name: Kelly Strickland  : 1955  MRN: [id number]  Today's Date: 3/14/2017  Onset of Illness/Injury or Date of Surgery Date: 17  Date of Referral to OT: 17  Referring Physician: MD Yudy       Admit Date: 3/10/2017       ICD-10-CM ICD-9-CM   1. Other closed displaced fracture of proximal end of right humerus, initial encounter S42.291A 812.09   2. Impaired functional mobility, balance, gait, and endurance Z74.09 V49.89   3. Impaired mobility and ADLs Z74.09 799.89     Patient Active Problem List   Diagnosis   • Disorder of bilirubin excretion   • Elevated blood-pressure reading without diagnosis of hypertension   • Herpes zoster   • Carotid bruit   • Closed fracture of humerus   • GERD (gastroesophageal reflux disease)   • Osteopenia   • S/P ORIF right humeral shaft fracture    • Acute post-operative pain     Past Medical History   Diagnosis Date   • Acid reflux    • Basal cell carcinoma of skin    • Herpes zoster    • Migraine    • Nephrolithiasis    • Osteopenia    • Thoracic vertebral fracture    • Vitamin D deficiency      Past Surgical History   Procedure Laterality Date   • Cholecystectomy     • Mohs surgery     • Cystoscopy w/ laser lithotripsy     • Breast biopsy     • Pr open treatment prox humeral fracture Right 3/13/2017     Procedure: OPEN REDUCTION INTERNAL FIXATION RIGHT HUMERUS;  Surgeon: Ryan Jimenes MD;  Location: [facility];  Service: Orthopedics          OT ASSESSMENT FLOWSHEET (last 72 hours)      OT Evaluation       17 1455 17 1454 17 2029 17 1650 17 0730    Rehab Evaluation    Document Type evaluation;discharge summary  -EL evaluation;discharge summary  -MJ       Subjective Information agree to therapy;complains of;pain  -EL agree to therapy;complains of;pain  -MJ       Patient Effort, Rehab Treatment excellent  -EL good  -MJ       Symptoms Noted  During/After Treatment none  -EL none  -MJ       General Information    Patient Profile Review yes  -EL yes  -MJ       Onset of Illness/Injury or Date of Surgery Date 03/13/17  -EL 03/13/17  -MJ       Referring Physician MD Yudy   -EL MD Yudy  -       General Observations Pt sitting EOB on arrival, R UE sling, interscalene nerve catheter,  present.   -EL Pt sitting EOB, sling to R UE, interscalene nerve catheter.  present  -MJ       Pertinent History Of Current Problem Pt presented to ED s/p fall at home with R UE pain. Pt is now POD #1 R proximal humerus ORIF. Received verbal orders from Dr. Jimenes's nurse Lisa this date for R UE WBAT and wean from R UE sling as tolerated.   -EL Pt presents for surgical management of R humerus fracture after a fall  -MJ       Precautions/Limitations fall precautions   R UE WBAT, R UE sling for comfort, On-Q ball  -EL brace on when up;shoulder precautions;other (see comments)   sling to R UE; nerve catheter  -MJ       Prior Level of Function independent:;all household mobility;community mobility;ADL's;home management;cooking;cleaning;driving;shopping  -EL independent:;all household mobility;community mobility;gait;transfer;bed mobility  -MJ       Equipment Currently Used at Home none  -EL none  -MJ  none  -SK     Plans/Goals Discussed With patient and family;agreed upon  -EL patient and family;agreed upon  -MJ       Risks Reviewed patient and family:;increased discomfort  -EL patient and family:;LOB;increased discomfort  -MJ       Benefits Reviewed patient and family:;improve function;increase independence  -EL patient and family:;improve function;increase independence;increase strength;increase balance;decrease pain  -MJ       Barriers to Rehab none identified  -EL none identified  -MJ       Living Environment    Lives With spouse  -EL spouse  -MJ       Living Arrangements house  -EL house  -MJ       Home Accessibility stairs to enter home   walk in shower  with built in seat   -EL stairs to enter home   walk-in shower  -MJ       Number of Stairs to Enter Home 1  -EL 1  -MJ       Transportation Available    car;family or friend will provide  -SK     Clinical Impression    Date of Referral to OT 03/14/17  -EL        OT Diagnosis decreased independence with ADLs   -EL        Patient/Family Goals Statement return to OF   -        Criteria for Skilled Therapeutic Interventions Met yes;treatment indicated  -EL        Rehab Potential good, to achieve stated therapy goals  -EL        Therapy Frequency evaluation only  -EL        Functional Level Prior    Ambulation    0-->independent  -SK     Transferring    0-->independent  -SK     Toileting    0-->independent  -SK     Bathing    0-->independent  -SK     Dressing    0-->independent  -SK     Eating    0-->independent  -SK     Communication    0-->understands/communicates without difficulty  -SK     Swallowing    0-->swallows foods/liquids without difficulty  -SK     Pain Assessment    Pain Assessment 0-10  -EL 0-10  -MJ       Pain Score 4  -EL 4  -MJ       Post Pain Score 4  -EL 4  -MJ       Pain Type Acute pain  -EL Acute pain  -MJ       Pain Location Arm  -EL Arm  -MJ       Pain Orientation Right  -EL Right  -MJ       Pain Intervention(s) Repositioned;Ambulation/increased activity  -EL Repositioned;Ambulation/increased activity  -MJ       Response to Interventions tolerated   -EL        Vision Assessment/Intervention    Visual Impairment WFL  -EL        Cognitive Assessment/Intervention    Current Cognitive/Communication Assessment functional  -EL functional  -MJ       Orientation Status oriented x 4  -EL oriented x 4  -MJ       Follows Commands/Answers Questions able to follow single-step instructions;100% of the time  -% of the time;able to follow multi-step instructions  -MJ       Personal Safety WNL/WFL  -EL WNL/WFL  -MJ       Personal Safety Interventions fall prevention program maintained;gait belt;nonskid  shoes/slippers when out of bed  -EL        ROM (Range of Motion)    General ROM upper extremity range of motion deficits identified  -EL no range of motion deficits identified  -MJ       General ROM Detail L UE WFL; R UE deferred  -EL for B-L LE, defer UE to OT  -MJ       MMT (Manual Muscle Testing)    General MMT Assessment upper extremity strength deficits identified  -EL no strength deficits identified  -       General MMT Assessment Detail L UE WFL; R UE deferred   -EL for B-L LE, defer UE to OT  -MJ       Muscle Tone Assessment    Muscle Tone Assessment   RUE;LUE;Bilateral Lower Extremities  -AB  Bilateral Lower Extremities;RUE;LUE  -CC    LUE Muscle Tone Assessment   associated movements noted  -AB  associated movements noted  -CC    RUE Muscle Tone Assessment   severely decreased tone  -AB  severely decreased tone  -CC    Bilateral Lower Extremities Muscle Tone Assessment   associated movements noted  -AB      Bed Mobility, Assessment/Treatment    Bed Mob, Supine to Sit, [place]  not tested   Pt sitting EOB  -MJ       Bed Mob, Sit to Supine, [place]  not tested   Pt sitting EOB  -       Bed Mobility, Comment pt sitting EOB on arrival, returned to sitting EOB at conclusion; educated on rolling to L side for supine to sit transfer to avoid pressure on R UE during bed mobility   -EL        Transfer Assessment/Treatment    Transfers, Sit-Stand [place] independent  -EL independent  -       Transfers, Stand-Sit [place] independent  -EL independent  -       Functional Mobility    Functional Mobility- Ind. Level independent  -EL        Functional Mobility-Distance (Feet) --   in hallway  -EL        Upper Body Bathing Assessment/Training    UB Bathing Assess/Train, Comment educated on pendulum technique for axilla care   -EL        Upper Body Dressing Assessment/Training    UB Dressing Assess/Train, Clothing Type doffing:;donning:;button up   R UE sling   -EL        UB Dressing  Assess/Train, Assist Device omid technique  -EL        UB Dressing Assess/Train, Position sitting;edge of bed  -EL        UB Dressing Assess/Train, [place] moderate assist (50% patient effort);verbal cues required  -EL        UB Dressing Assess/Train, Impairments ROM decreased;strength decreased  -EL        UB Dressing Assess/Train, Comment educated on omid dressing technique and sling management with care of On-Q ball  -EL        Positioning and Restraints    Pre-Treatment Position in bed  -EL in bed  -MJ       Post Treatment Position bed  -EL bed  -MJ       In Bed notified nsg;call light within reach;sitting EOB;encouraged to call for assist;with family/caregiver;with brace  -EL notified nsg;sitting EOB;call light within reach;encouraged to call for assist;with family/caregiver;with OT  -MJ         03/12/17 2054 03/12/17 1904 03/12/17 1805 03/12/17 1704 03/12/17 1607    Muscle Tone Assessment    Muscle Tone Assessment Bilateral Lower Extremities;RUE;LUE  -AB LLE;RLE;Bilateral Lower Extremities  -PM LLE;RLE;Bilateral Lower Extremities  -PM LLE;RLE;Bilateral Lower Extremities  -PM LLE;RLE;Bilateral Lower Extremities  -PM    LUE Muscle Tone Assessment associated movements noted  -AB        RUE Muscle Tone Assessment severely decreased tone  -AB        LLE Muscle Tone Assessment  associated movements noted  -PM associated movements noted  -PM associated movements noted  -PM associated movements noted  -PM    RLE Muscle Tone Assessment  severely decreased tone  -PM severely decreased tone  -PM severely decreased tone  -PM severely decreased tone  -PM    Bilateral Lower Extremities Muscle Tone Assessment  associated movements noted  -PM associated movements noted  -PM associated movements noted  -PM associated movements noted  -PM      03/12/17 1438 03/12/17 1341 03/12/17 1250 03/12/17 1041 03/12/17 0900    Muscle Tone Assessment    Muscle Tone Assessment LLE;RLE;Bilateral Lower Extremities  -PM LLE;RLE;Bilateral  Lower Extremities  -PM LLE;RLE;Bilateral Lower Extremities  -PM LLE;RLE;Bilateral Lower Extremities  -PM LLE;RLE;Bilateral Lower Extremities  -PM    LLE Muscle Tone Assessment associated movements noted  -PM associated movements noted  -PM associated movements noted  -PM associated movements noted  -PM associated movements noted  -PM    RLE Muscle Tone Assessment severely decreased tone  -PM severely decreased tone  -PM severely decreased tone  -PM severely decreased tone  -PM severely decreased tone  -PM    Bilateral Lower Extremities Muscle Tone Assessment associated movements noted  -PM associated movements noted  -PM associated movements noted  -PM associated movements noted  -PM associated movements noted  -PM      03/12/17 0314                General Information    Equipment Currently Used at Home none  -JR        Living Environment    Transportation Available car  -JR          User Key  (r) = Recorded By, (t) = Taken By, (c) = Cosigned By    Initials Name Effective Dates    JAN Rivas, PT 03/14/16 -     SK Sonja C Kellerman, RN 03/14/16 -     AB Serenity Melgar, RN 06/16/16 -     CC Winnie Olguin, DEANA 06/16/16 -     PM Krysta Arreola LPN 03/14/16 -     EL Yolanda Martinez OT 06/08/16 -     JR Louise Reed RN 09/18/16 -           Occupational Therapy Education     Title: PT OT SLP Therapies (Done)     Topic: Occupational Therapy (Done)     Point: ADL training (Done)    Description: Instruct learner(s) on proper safety adaptation and remediation techniques during self care or transfers.   Instruct in proper use of assistive devices.    Learning Progress Summary    Learner Readiness Method Response Comment Documented by Status   Patient Acceptance KAYLIN CHUNG DU Educated on sling management, omid dressing, pendulum technique for axilla care, On-Q ball management, and home safety upon D/C. EL 03/14/17 1607 Done   Family Acceptance KAYLIN CHUNG DU Educated on sling management, omid dressing, pendulum technique for  axilla care, On-Q ball management, and home safety upon D/C.  03/14/17 1607 Done               Point: Precautions (Done)    Description: Instruct learner(s) on prescribed precautions during self-care and functional transfers.    Learning Progress Summary    Learner Readiness Method Response Comment Documented by Status   Patient Acceptance SHELDON,KAYLIN KENNETH DENISE Educated on sling management, omid dressing, pendulum technique for axilla care, On-Q ball management, and home safety upon D/C.  03/14/17 1607 Done   Family Acceptance SHELDON,KAYLIN KENNETH DENISE Educated on sling management, omid dressing, pendulum technique for axilla care, On-Q ball management, and home safety upon D/C.  03/14/17 1607 Done               Point: Body mechanics (Done)    Description: Instruct learner(s) on proper positioning and spine alignment during self-care, functional mobility activities and/or exercises.    Learning Progress Summary    Learner Readiness Method Response Comment Documented by Status   Patient Acceptance SHELDON,KAYLIN KENNETH DENISE Educated on sling management, omid dressing, pendulum technique for axilla care, On-Q ball management, and home safety upon D/C.  03/14/17 1607 Done   Family Acceptance SHELDON,KAYLIN KENNETH DENISE Educated on sling management, omid dressing, pendulum technique for axilla care, On-Q ball management, and home safety upon D/C.  03/14/17 1607 Done                      User Key     Initials Effective Dates Name Provider Type Discipline     06/08/16 -  Yolanda Martinez, OT Occupational Therapist OT                OT Recommendation and Plan  Therapy Frequency: evaluation only  Plan of Care Review  Plan Of Care Reviewed With: patient  Progress: progress toward functional goals as expected  Outcome Summary/Follow up Plan: OT sundar completed this date. Pt educated on sx precautions, sling management, On-Q ball care, omid dressing and axilla care. Pt and  demo good understanding of teaching. Recommend home with assist. Plan for D/C this date.             OT Goals       03/14/17 1608          Patient Education OT LTG    Patient Education OT LTG, Date Established 03/14/17  -EL      Patient Education OT LTG, Time to Achieve by discharge  -EL      Patient Education OT LTG, Education Type 1 hand/omid technique;precautions per surgeon;rodolfo/doff brace;brace use/care  -EL      Patient Education OT LTG, Education Understanding demonstrates adequately;verbalizes understanding  -EL      Patient Education OT LTG Outcome goal met  -EL      UB Dressing OT LTG    UB Dressing Goal OT LTG, Date Established 03/14/17  -EL      UB Dressing Goal OT LTG, Time to Achieve by discharge  -EL      UB Dressing Goal OT LTG, Activity Type demo omid-dressing and sling management with assist from caregiver   -EL      UB Dressing Goal OT LTG, [place] Level moderate assist (50% patient effort);verbal cues required  -EL      UB Dressing Goal OT LTG, Outcome goal met  -EL        User Key  (r) = Recorded By, (t) = Taken By, (c) = Cosigned By    Initials Name Provider Type    SHERWNI Martinez, OT Occupational Therapist                Outcome Measures       03/14/17 1455 03/14/17 1454       How much help from another person do you currently need...    Turning from your back to your side while in flat bed without using bedrails?  4  -MJ     Moving from lying on back to sitting on the side of a flat bed without bedrails?  4  -MJ     Moving to and from a bed to a chair (including a wheelchair)?  4  -MJ     Standing up from a chair using your arms (e.g., wheelchair, bedside chair)?  4  -MJ     Climbing 3-5 steps with a railing?  3  -MJ     To walk in hospital room?  3  -MJ     AM-PAC 6 Clicks Score  22  -MJ     How much help from another is currently needed...    Putting on and taking off regular lower body clothing? 3  -EL      Bathing (including washing, rinsing, and drying) 3  -EL      Toileting (which includes using toilet bed pan or urinal) 3  -EL      Putting on and taking off regular upper  body clothing 3  -EL      Taking care of personal grooming (such as brushing teeth) 3  -EL      Eating meals 3  -EL      Score 18  -EL      Functional Assessment    Outcome Measure Options AM-PAC 6 Clicks Daily Activity (OT)  -EL AM-PAC 6 Clicks Basic Mobility (PT)  -MJ       User Key  (r) = Recorded By, (t) = Taken By, (c) = Cosigned By    Initials Name Provider Type    JAN Rivas, PT Physical Therapist    EL Yolanda Martinez, OT Occupational Therapist          Time Calculation:         Time Calculation- OT       03/14/17 1613          Time Calculation- OT    OT Start Time 1455  -EL      Total Timed Code Minutes- OT 15 minute(s)  -EL      OT Received On 03/14/17  -EL        User Key  (r) = Recorded By, (t) = Taken By, (c) = Cosigned By    Initials Name Provider Type    EL Yolanda Martinez OT Occupational Therapist          Therapy Charges for Today     Code Description Service Date Service Provider Modifiers Qty    [id number]  OT EVAL LOW COMPLEXITY 4 3/14/2017 Yolanda Martinez OT GO 1    [id number]  OT THERAPEUTIC ACT EA 15 MIN 3/14/2017 Yolanda Martinez OT GO 1    [id number]  OT SELFCARE CURRENT 3/14/2017 Yolanda Martinez OT GO, CK 1    [id number] HC OT SELFCARE PROJECTED 3/14/2017 Yolanda Martinez OT PACO, CJ 1    [id number]  OT SELFCARE DISCHARGE 3/14/2017 Yolanda Martinez OT PACO, CK 1          OT G-codes  OT Functional Scales Options: AM-PAC 6 Clicks Daily Activity (OT)  Functional Limitation: Self care  Self Care Current Status (): At least 40 percent but less than 60 percent impaired, limited or restricted  Self Care Goal Status (): At least 20 percent but less than 40 percent impaired, limited or restricted  Self Care Discharge Status (): At least 40 percent but less than 60 percent impaired, limited or restricted         Yolanda Martinez OT  3/14/2017

## 2017-03-14 NOTE — PLAN OF CARE
Problem: Patient Care Overview (Adult)  Goal: Plan of Care Review  Outcome: Ongoing (interventions implemented as appropriate)    03/14/17 1608   Coping/Psychosocial Response Interventions   Plan Of Care Reviewed With patient   Patient Care Overview   Progress progress toward functional goals as expected   Outcome Evaluation   Outcome Summary/Follow up Plan OT sundar completed this date. Pt educated on sx precautions, sling management, On-Q ball care, omid dressing and axilla care. Pt and  demo good understanding of teaching. Recommend home with assist. Plan for D/C this date.          Problem: Orthopaedic Fracture (Adult)  Goal: Signs and Symptoms of Listed Potential Problems Will be Absent or Manageable (Orthopaedic Fracture)  Outcome: Ongoing (interventions implemented as appropriate)    03/14/17 1608   Orthopaedic Fracture   Problems Assessed (Orthopaedic Fracture) functional deficit/ self-care deficit   Problems Present (Orthopaedic Fracture) functional deficit/ self-care deficit         Problem: Inpatient Occupational Therapy  Goal: Patient Education Goal LTG- OT  Outcome: Outcome(s) achieved Date Met:  03/14/17 03/14/17 1608   Patient Education OT LTG   Patient Education OT LTG, Date Established 03/14/17   Patient Education OT LTG, Time to Achieve by discharge   Patient Education OT LTG, Education Type 1 hand/omid technique;precautions per surgeon;rodolfo/doff brace;brace use/care   Patient Education OT LTG, Education Understanding demonstrates adequately;verbalizes understanding   Patient Education OT LTG Outcome goal met       Goal: UB Dressing Goal LTG- OT  Outcome: Outcome(s) achieved Date Met:  03/14/17 03/14/17 1608   UB Dressing OT LTG   UB Dressing Goal OT LTG, Date Established 03/14/17   UB Dressing Goal OT LTG, Time to Achieve by discharge   UB Dressing Goal OT LTG, Activity Type demo omid-dressing and sling management with assist from caregiver    UB Dressing Goal OT LTG, [place] Level  moderate assist (50% patient effort);verbal cues required   UB Dressing Goal OT LTG, Outcome goal met

## 2017-03-14 NOTE — PLAN OF CARE
Problem: Patient Care Overview (Adult)  Goal: Plan of Care Review  Outcome: Ongoing (interventions implemented as appropriate)    03/14/17 3656   Coping/Psychosocial Response Interventions   Plan Of Care Reviewed With patient   Outcome Evaluation   Outcome Summary/Follow up Plan PT eval completed. Pt demo good balance, is independent with functional mobility, and is discharging home with assist. No goals established as discharge occurs same day as evaluation.

## 2017-03-14 NOTE — PROGRESS NOTES
Orthopedic Daily Progress Note      CC: Severe pain    Pain not controlled  General: no fevers, chills  Abdomen: some nausea, no vomiting, or diarrhea    No other complaints    Physical Exam:  I have reviewed the vital signs.  Temp:  [97.1 °F (36.2 °C)-99 °F (37.2 °C)] 97.6 °F (36.4 °C)  Heart Rate:  [] 106  Resp:  [16-18] 16  BP: (101-148)/(52-81) 141/67    Objective  General Appearance:    Alert, cooperative, no distress  Extremities: No clubbing, cyanosis, or edema to lower extremities  Pulses:  2+ in distal surgical extremity  Skin: Dressing Clean/dry/intact  R/M/U n. Motor/sensory intact      Results Review:    I have reviewed the labs, radiology results and diagnostic studies:yes      Results from last 7 days  Lab Units 03/11/17  0513   WBC 10*3/mm3 6.77   HEMOGLOBIN g/dL 12.3   PLATELETS 10*3/mm3 261       Results from last 7 days  Lab Units 03/11/17  0513   SODIUM mmol/L 137   POTASSIUM mmol/L 3.8   TOTAL CO2 mmol/L 27.0   CREATININE mg/dL 0.60   GLUCOSE mg/dL 91       I have reviewed the medications.    Assessment/Problem List  POD# 1 S/p ORIF R humerus fracture    Plan  PT/OT  Pain control - anesthesia to eval block      Discharge Planning: I expect patient to be discharged to home once comfortable.    Ryan Jimenes MD  03/14/17  7:12 AM

## 2017-03-14 NOTE — PROGRESS NOTES
[facility]    Acute pain service Inpatient Progress Note    Patient Name: Kelly Strickland  :  1955  MRN:  [id number]        Acute Pain  Service Inpatient Progress Note:    Analgesia:Poor  Pain Score:10/10  LOC: alert and awake  Resp Status: room air  Cardiac: VS stable  Side Effects:None  Catheter Site:clean, dressing intact and dry  Cath type: peripheral nerve cath with ON Q  Infusion rate: 6ml/hr  Catheter Plan:Catheter to remain Insitu and Infusion rate changed to  Comments: Follow-up on patient from floor RN. Pt uncomfortable at proximal incision site, distal site feels more comfortable comparatively. Able to move all fingers. ON Q bolus infusing. This was changed from 14ml/hr to 8ml/hr , after a 10ml 0.2% ropiv. bolus was given via nerve catheter. Pt and spousal teaching and reassurance given. Will continue to monitor and follow-up on current intervention .

## 2017-03-14 NOTE — OP NOTE
DATE OF PROCEDURE:  03/13/2017    PREOPERATIVE DIAGNOSIS: Right humeral shaft fracture.     POSTOPERATIVE DIAGNOSIS: Right humeral shaft fracture.     PROCEDURE PERFORMED: Open reduction and internal fixation of right humeral shaft fracture.     SURGEON: Ryan Jimenes MD    ASSISTANT: Shahnaz Guzman PA-C, medically necessary for this procedure.     ANESTHESIA: General with postoperative block.     ESTIMATED BLOOD LOSS: 260 mL.     COMPLICATIONS: None.     DISPOSITION: To the recovery room in stable condition.     INDICATIONS: This is a 62-year-old female who fell sustaining a displaced midshaft humerus fracture. There was significant shortening and angulation, and the risks and benefits of surgical intervention, including radial nerve palsy versus nonsurgical management were discussed. I did explain to her that the standard of care in most cases is nonsurgical management with an acceptable 90% healing rate, though there is a significant down with limited mobility and significant pain. Surgical fixation may provide a slightly higher healing rate, though not significantly, but certainly can improve or lead to earlier mobility. The relative pros and cons of each approach were discussed and she wished to proceed with surgical management. She identified her right arm as the correct operative extremity. This was initialed by the surgeon with her acknowledgment.     DESCRIPTION OF PROCEDURE:  She was taken to the operating room and placed in the supine position. Upon induction of adequate anesthesia, she had her right upper extremity prepped and draped in the usual sterile fashion. Timeout confirmed the correct patient and operative extremity and that antibiotics were on board. An anterior incision was made and carried sharply through the skin and subcutaneous tissue. The fascia over the biceps was opened and the cephalic vein was identified and mobilized lateral to that. The biceps was mobilized medially and the brachialis  muscle was identified just over the fracture fragments. Subperiosteal dissection freed the basic split in the brachialis that had already formed. The fracture did appear to be a long oblique, which was cleaned and reduced, and two 3.5 lag screws were placed across the fracture. Unfortunately, in the process of placing the plate, she developed further splintering of the fracture and comminution due to poor bone quality. One of the screws provided excellent fixation and was replaced to provide strong fixation of this. A 4.5 DCP plate was then applied with 4 bicortical screws proximal and 4 bicortical screws distal to provide appropriate fixation of the fracture. AP and lateral images and rotation showed acceptable reduction of the fracture and maintenance of this through gentle range of motion. A small piece of bone did avulse with the deltoid, but involved approximately 1.5 cm in length and 1 cm width, and this was sutured back to the plate for extra stability using #2 FiberWire. The remaining construct was stable. Copious irrigation was carried out. Direct visualization of the radial nerve was not carried out but was palpated and protected throughout the procedure. The subcutaneous tissue was closed with 2-0 Vicryl, the skin with nylon and a sterile dressing was placed. Anesthesia was reversed and the patient was taken to the recovery room in stable condition. All instrument, needle and sponge counts were correct.       MD ZARINA Wilder/sonu  DD: 03/13/2017 13:59:17  DT: 03/13/2017 16:25:07  Voice Rec. ID #[id number]  Voice Original ID #[id number]  Doc ID #[id number]  Rev. #1 (dos)  cc:

## 2017-03-14 NOTE — PATIENT CARE CONFERENCE
Spoke with Lisa at Dr. Jimenes's office at 15:35pm on 3/13/17. Received verbal orders over phone from Dr. Jimenes for WBAT R UE and wean from R UE sling as pt able to tolerate. Educated pt on sling management, omid-dressing, axilla care, and activity orders from Dr. Jimenes. Yolanda Martinez, OT 3/14/2017 3:52 PM

## 2017-03-15 NOTE — PROGRESS NOTES
Nerve Cath Post Op Call    Patient Name: Kelly Strickland  :  1955  MRN:  [id number]  Date of Discharge:     Nerve Cath Post Op Call:    Analgesia:Good  Pain Score:4/10  Side Effects:None  Catheter Site:clean  Patient Controlled ON Q pump infusion rate: 8ml/hr  Catheter Plan:Will continue with plan at home without changes and The patient was instructed to call ON CALL Anesthesia provider for any questions or problems

## 2017-03-16 NOTE — PROGRESS NOTES
[facility]    Nerve Cath Post Op Call    Patient Name: Kelly Strickland  :  1955  MRN:  [id number]  Date of Discharge: 3/14/2017    Nerve Cath Post Op Call:    Analgesia:Fair  Pain Score:6/10  Side Effects:None  Catheter Site:clean  Patient Controlled ON Q pump infusion rate: 8ml/hr  Catheter Plan:Modify plan as follows (see note)    Patient had rough day yesterday and we discussed plan  Turn rate to 12 ml/ hr and leave  Possibly turn down to 10 ml/hr at night  Patient happy with this plan. Will call with further questions.

## 2017-03-17 NOTE — PROGRESS NOTES
[facility]    Nerve Cath Post Op Call    Patient Name: Kelly Strickland  :  1955  MRN:  [id number]  Date of Discharge: 3/14/2017    Nerve Cath Post Op Call:    Catheter Plan:Patient/Family member report nerve catheter previously discontinued, tip intact and The patient was instructed to call ON CALL Anesthesia provider for any questions or problems

## 2017-04-12 DIAGNOSIS — F41.1 ANXIETY AS ACUTE REACTION TO EXCEPTIONAL STRESS: ICD-10-CM

## 2017-04-12 DIAGNOSIS — F43.0 ANXIETY AS ACUTE REACTION TO EXCEPTIONAL STRESS: ICD-10-CM

## 2017-06-10 DIAGNOSIS — F43.0 ANXIETY AS ACUTE REACTION TO EXCEPTIONAL STRESS: ICD-10-CM

## 2017-06-10 DIAGNOSIS — F41.1 ANXIETY AS ACUTE REACTION TO EXCEPTIONAL STRESS: ICD-10-CM

## 2017-06-29 ENCOUNTER — OFFICE VISIT (OUTPATIENT)
Dept: INTERNAL MEDICINE | Facility: CLINIC | Age: 62
End: 2017-06-29

## 2017-06-29 VITALS
BODY MASS INDEX: 26.97 KG/M2 | WEIGHT: 162.1 LBS | OXYGEN SATURATION: 98 % | DIASTOLIC BLOOD PRESSURE: 80 MMHG | HEART RATE: 77 BPM | SYSTOLIC BLOOD PRESSURE: 100 MMHG

## 2017-06-29 DIAGNOSIS — M81.0 OSTEOPOROSIS: ICD-10-CM

## 2017-06-29 DIAGNOSIS — F41.1 ANXIETY AS ACUTE REACTION TO EXCEPTIONAL STRESS: ICD-10-CM

## 2017-06-29 DIAGNOSIS — F43.0 ANXIETY AS ACUTE REACTION TO EXCEPTIONAL STRESS: ICD-10-CM

## 2017-06-29 DIAGNOSIS — Z00.00 HEALTH CARE MAINTENANCE: Primary | ICD-10-CM

## 2017-06-29 LAB
25(OH)D3 SERPL-MCNC: 28.9 NG/ML
ALBUMIN SERPL-MCNC: 4.4 G/DL (ref 3.2–4.8)
ALBUMIN/GLOB SERPL: 1.6 G/DL (ref 1.5–2.5)
ALP SERPL-CCNC: 137 U/L (ref 25–100)
ALT SERPL W P-5'-P-CCNC: 23 U/L (ref 7–40)
ANION GAP SERPL CALCULATED.3IONS-SCNC: 8 MMOL/L (ref 3–11)
ARTICHOKE IGE QN: 115 MG/DL (ref 0–130)
AST SERPL-CCNC: 25 U/L (ref 0–33)
BASOPHILS # BLD AUTO: 0.02 10*3/MM3 (ref 0–0.2)
BASOPHILS NFR BLD AUTO: 0.3 % (ref 0–1)
BILIRUB BLD-MCNC: NEGATIVE MG/DL
BILIRUB SERPL-MCNC: 1.2 MG/DL (ref 0.3–1.2)
BUN BLD-MCNC: 17 MG/DL (ref 9–23)
BUN/CREAT SERPL: 21.3 (ref 7–25)
CALCIUM SPEC-SCNC: 10.1 MG/DL (ref 8.7–10.4)
CHLORIDE SERPL-SCNC: 105 MMOL/L (ref 99–109)
CHOLEST SERPL-MCNC: 207 MG/DL (ref 0–200)
CLARITY, POC: CLEAR
CO2 SERPL-SCNC: 27 MMOL/L (ref 20–31)
COLOR UR: YELLOW
CREAT BLD-MCNC: 0.8 MG/DL (ref 0.6–1.3)
DEPRECATED RDW RBC AUTO: 46.2 FL (ref 37–54)
EOSINOPHIL # BLD AUTO: 0.18 10*3/MM3 (ref 0–0.3)
EOSINOPHIL NFR BLD AUTO: 3.1 % (ref 0–3)
ERYTHROCYTE [DISTWIDTH] IN BLOOD BY AUTOMATED COUNT: 13.5 % (ref 11.3–14.5)
GFR SERPL CREATININE-BSD FRML MDRD: 73 ML/MIN/1.73
GLOBULIN UR ELPH-MCNC: 2.7 GM/DL
GLUCOSE BLD-MCNC: 82 MG/DL (ref 70–100)
GLUCOSE UR STRIP-MCNC: NEGATIVE MG/DL
HCT VFR BLD AUTO: 44.9 % (ref 34.5–44)
HDLC SERPL-MCNC: 76 MG/DL (ref 40–60)
HGB BLD-MCNC: 14.5 G/DL (ref 11.5–15.5)
IMM GRANULOCYTES # BLD: 0.03 10*3/MM3 (ref 0–0.03)
IMM GRANULOCYTES NFR BLD: 0.5 % (ref 0–0.6)
KETONES UR QL: NEGATIVE
LEUKOCYTE EST, POC: NEGATIVE
LYMPHOCYTES # BLD AUTO: 1.84 10*3/MM3 (ref 0.6–4.8)
LYMPHOCYTES NFR BLD AUTO: 31.8 % (ref 24–44)
MCH RBC QN AUTO: 30 PG (ref 27–31)
MCHC RBC AUTO-ENTMCNC: 32.3 G/DL (ref 32–36)
MCV RBC AUTO: 93 FL (ref 80–99)
MONOCYTES # BLD AUTO: 0.53 10*3/MM3 (ref 0–1)
MONOCYTES NFR BLD AUTO: 9.2 % (ref 0–12)
NEUTROPHILS # BLD AUTO: 3.18 10*3/MM3 (ref 1.5–8.3)
NEUTROPHILS NFR BLD AUTO: 55.1 % (ref 41–71)
NITRITE UR-MCNC: NEGATIVE MG/ML
PH UR: 6 [PH] (ref 5–8)
PLATELET # BLD AUTO: 253 10*3/MM3 (ref 150–450)
PMV BLD AUTO: 9.5 FL (ref 6–12)
POTASSIUM BLD-SCNC: 4.4 MMOL/L (ref 3.5–5.5)
PROT SERPL-MCNC: 7.1 G/DL (ref 5.7–8.2)
PROT UR STRIP-MCNC: NEGATIVE MG/DL
RBC # BLD AUTO: 4.83 10*6/MM3 (ref 3.89–5.14)
RBC # UR STRIP: NEGATIVE /UL
SODIUM BLD-SCNC: 140 MMOL/L (ref 132–146)
SP GR UR: 1.02 (ref 1–1.03)
TRIGL SERPL-MCNC: 85 MG/DL (ref 0–150)
TSH SERPL DL<=0.05 MIU/L-ACNC: 2.34 MIU/ML (ref 0.35–5.35)
UROBILINOGEN UR QL: NORMAL
WBC NRBC COR # BLD: 5.78 10*3/MM3 (ref 3.5–10.8)

## 2017-06-29 PROCEDURE — 85025 COMPLETE CBC W/AUTO DIFF WBC: CPT | Performed by: INTERNAL MEDICINE

## 2017-06-29 PROCEDURE — 84443 ASSAY THYROID STIM HORMONE: CPT | Performed by: INTERNAL MEDICINE

## 2017-06-29 PROCEDURE — 99396 PREV VISIT EST AGE 40-64: CPT | Performed by: INTERNAL MEDICINE

## 2017-06-29 PROCEDURE — 80053 COMPREHEN METABOLIC PANEL: CPT | Performed by: INTERNAL MEDICINE

## 2017-06-29 PROCEDURE — 82306 VITAMIN D 25 HYDROXY: CPT | Performed by: INTERNAL MEDICINE

## 2017-06-29 PROCEDURE — 81003 URINALYSIS AUTO W/O SCOPE: CPT | Performed by: INTERNAL MEDICINE

## 2017-06-29 PROCEDURE — 80061 LIPID PANEL: CPT | Performed by: INTERNAL MEDICINE

## 2017-06-29 RX ORDER — IBANDRONATE SODIUM 150 MG/1
150 TABLET, FILM COATED ORAL
Qty: 12 TABLET | Refills: 0 | Status: SHIPPED | OUTPATIENT
Start: 2017-06-29 | End: 2019-11-04

## 2017-06-29 NOTE — PROGRESS NOTES
Chief Complaint   Patient presents with   • Annual Exam           Well PE    Reported Health  Good Yes  FairNo  PoorNo      Dental,Vision,Hearing  Regular dental visitsYes  Vision ProblemsYes  Hearing LossNo      Immunization Status:  Up To DateYes        Lifestyle  Healthy DietYes  Weight ConcernsYes  Regular ExerciseNo  Tobacco UseNo  Alcohol UseYes  Drug AbuseNo  No current exercise secondary to right arm fracture    Screening  Cancer ScreeningYes  Metabolic ScreeningYes  Risk ScreeningYes  Past Medical History:   Diagnosis Date   • Acid reflux    • Basal cell carcinoma of skin    • Herpes zoster    • Humeral fracture    • Migraine    • Nephrolithiasis    • Osteopenia    • Thoracic vertebral fracture    • Vitamin D deficiency      Past Surgical History:   Procedure Laterality Date   • BREAST BIOPSY     • CHOLECYSTECTOMY     • CYSTOSCOPY W/ LASER LITHOTRIPSY     • MOHS SURGERY     • ORIF HUMERAL SHAFT FRACTURE     • NJ OPEN TREATMENT PROX HUMERAL FRACTURE Right 3/13/2017    Procedure: OPEN REDUCTION INTERNAL FIXATION RIGHT HUMERUS;  Surgeon: Ryan Jimenes MD;  Location: [facility];  Service: Orthopedics     Family History   Problem Relation Age of Onset   • Stroke Mother    • Hypertension Mother    • Osteoporosis Mother    • Kidney cancer Father    • Prostate cancer Father    • Diabetes type II Father    • Breast cancer Sister    • Osteoporosis Maternal Aunt    • Breast cancer Maternal Aunt    • Colon cancer Maternal Aunt      Social History     Social History   • Marital status:      Spouse name: N/A   • Number of children: N/A   • Years of education: N/A     Occupational History   • Not on file.     Social History Main Topics   • Smoking status: Never Smoker   • Smokeless tobacco: Never Used   • Alcohol use Yes      Comment: occasionally   • Drug use: No   • Sexual activity: Defer     Other Topics Concern   • Not on file     Social History Narrative         Review of Systems   Constitutional:  Negative for activity change, appetite change, chills, diaphoresis, fatigue, fever and unexpected weight change.   HENT: Negative for congestion, ear discharge, ear pain, mouth sores, nosebleeds, sinus pressure, sneezing and sore throat.    Eyes: Negative for pain, discharge and itching.   Respiratory: Negative for cough, chest tightness, shortness of breath and wheezing.    Cardiovascular: Negative for chest pain, palpitations and leg swelling.   Gastrointestinal: Negative for abdominal pain, constipation, diarrhea, nausea and vomiting.   Endocrine: Negative for cold intolerance, heat intolerance, polydipsia and polyphagia.   Genitourinary: Negative for dysuria, flank pain, frequency, hematuria and urgency.   Musculoskeletal: Negative for arthralgias, back pain, gait problem, myalgias, neck pain and neck stiffness.   Skin: Negative for color change, pallor and rash.   Neurological: Negative for seizures, speech difficulty, numbness and headaches.   Psychiatric/Behavioral: Negative for agitation, confusion, decreased concentration and sleep disturbance. The patient is not nervous/anxious.      /80  Pulse 77  Wt 162 lb 1.6 oz (73.5 kg)  SpO2 98%  BMI 26.97 kg/m2    Physical Exam   Constitutional: She is oriented to person, place, and time. She appears well-developed.   HENT:   Head: Normocephalic.   Right Ear: External ear normal.   Left Ear: External ear normal.   Nose: Nose normal.   Mouth/Throat: Oropharynx is clear and moist.   Eyes: Conjunctivae are normal. Pupils are equal, round, and reactive to light.   Neck: No JVD present. No thyromegaly present.   Cardiovascular: Normal rate, regular rhythm and normal heart sounds.  Exam reveals no friction rub.    No murmur heard.  Pulmonary/Chest: Effort normal and breath sounds normal. No respiratory distress. She has no wheezes. She has no rales.   Abdominal: Soft. Bowel sounds are normal. She exhibits no distension. There is no tenderness. There is no  guarding.   Musculoskeletal: She exhibits no edema or tenderness.   Lymphadenopathy:     She has no cervical adenopathy.   Neurological: She is alert and oriented to person, place, and time. She has normal reflexes. She displays normal reflexes. No cranial nerve deficit.   Skin: No rash noted.   Psychiatric: She has a normal mood and affect. Her behavior is normal.   Nursing note and vitals reviewed.      Diet and Exercise    Healthy Diet Yes  Adequate DietYes  Poor DietNo  Adequate Exercise RegimenNo  Inadequate Exercise RegimenYes      Cervical Cancer Screening    Risks and benefits discussedYes  Screening currentNo  PAP Done Today OrderedYes  Screening Not IndicatedNo  Pap Every 3 yearsYes  Screening Done By GYNNo    Breast Cancer screening  Risks and Benefits DiscussedYes  Self Breast Exam taughtNo  Monthly Self Exam AdvisedYes  Screening CurrentYes  Mammogram OrderedNo  Screening Not IndicatedNo  Screening Managed By GYNNo  Patient DeclinesNo      STD Testing  ChlamydiaNo  GonorrheaNo  HIVNo      Osteoporosis Screening  Risks And Benefits DiscussedYes  BMD CurrentYes  BMD orderedNo  Patient DeclinesNo    Colorectal Cancer Screening  Risks and Benefits DiscussedYes  Screening currentYes  FOBT Supplies givenYes  FOBT Every YearYes  Colonoscopy OrderedNo  Colonoscopy every 5 yearsYes  Colonoscopy every 10 yearsNo  Screening not indicatedNo  Patient declinesNo    Metabolic Screening  GlucoseYes  LipidsYes  CBCYes  TSHYes  UAYes  CMPYes  25OHYes      Immunizations  Risks and benefits discussedYes  Immunizations Up To DateYes  Immunizations NeededNo  Immunizations Per OrdersYes  Patient DNoeclines      Preventative Counseling  NutritionYes  Aerobic ExerciseYes  Weight Bearing ExerciseYes  Weight LossYes  Calcium SupplementsYes  Vitamin D SupplementsYes  Reproductive HealthNo  Cardiovascular Risk ReductionYes  Tobacco CessationNo  Alcohol UseYes  Sunscreen UseYes  Self Skin ExaminationNo  Helmet UseNo  Seat Belt  UseYes  Fall Risk ReductionYes  Advanced Directive PlanningYes      Patient Discussion  PatientYes  FamilyNo  CounselingYes  Kelly was seen today for annual exam.    Diagnoses and all orders for this visit:    Health care maintenance  -     POC Urinalysis Dipstick, Automated

## 2017-07-13 ENCOUNTER — OFFICE VISIT (OUTPATIENT)
Dept: INTERNAL MEDICINE | Facility: CLINIC | Age: 62
End: 2017-07-13

## 2017-07-13 VITALS
BODY MASS INDEX: 27.62 KG/M2 | OXYGEN SATURATION: 98 % | HEART RATE: 64 BPM | SYSTOLIC BLOOD PRESSURE: 140 MMHG | DIASTOLIC BLOOD PRESSURE: 90 MMHG | WEIGHT: 166 LBS

## 2017-07-13 DIAGNOSIS — R12 HEARTBURN: Primary | ICD-10-CM

## 2017-07-13 PROCEDURE — 99213 OFFICE O/P EST LOW 20 MIN: CPT | Performed by: INTERNAL MEDICINE

## 2017-07-13 NOTE — PROGRESS NOTES
Subjective   Kelly Strickland is a 62 y.o. female.   Chief Complaint   Patient presents with   • Heartburn       History of Present Illness   Had burning in chest Monday.Laste about 1/2 hour . Did not take anything for it. Resolved  On own. Hx paraesophageal hernia with Nissen done.   The following portions of the patient's history were reviewed and updated as appropriate: allergies, current medications, past family history, past medical history, past social history, past surgical history and problem list.    Review of Systems   Constitutional: Negative for activity change, appetite change, chills, diaphoresis, fatigue, fever and unexpected weight change.   HENT: Negative for congestion, ear discharge, ear pain, mouth sores, nosebleeds, sinus pressure, sneezing and sore throat.    Eyes: Negative for pain, discharge and itching.   Respiratory: Negative for cough, chest tightness, shortness of breath and wheezing.    Cardiovascular: Negative for chest pain, palpitations and leg swelling.   Gastrointestinal: Negative for abdominal pain, constipation, diarrhea, nausea and vomiting.        Reflux   Endocrine: Negative for cold intolerance, heat intolerance, polydipsia and polyphagia.   Genitourinary: Negative for dysuria, flank pain, frequency, hematuria and urgency.   Musculoskeletal: Negative for arthralgias, back pain, gait problem, myalgias, neck pain and neck stiffness.   Skin: Negative for color change, pallor and rash.   Neurological: Negative for seizures, speech difficulty, numbness and headaches.   Psychiatric/Behavioral: Negative for agitation, confusion, decreased concentration and sleep disturbance. The patient is not nervous/anxious.    /90  Pulse 64  Wt 166 lb (75.3 kg)  SpO2 98%  BMI 27.62 kg/m2      Objective   Physical Exam   Constitutional: She appears well-developed.   HENT:   Head: Normocephalic.   Right Ear: External ear normal.   Left Ear: External ear normal.   Nose: Nose normal.    Mouth/Throat: Oropharynx is clear and moist.   Eyes: Conjunctivae are normal. Pupils are equal, round, and reactive to light.   Neck: No JVD present. No thyromegaly present.   Cardiovascular: Normal rate, regular rhythm and normal heart sounds.  Exam reveals no friction rub.    No murmur heard.  Pulmonary/Chest: Effort normal and breath sounds normal. No respiratory distress. She has no wheezes. She has no rales.   Abdominal: Soft. Bowel sounds are normal. She exhibits no distension. There is no tenderness. There is no guarding.   Musculoskeletal: She exhibits no edema or tenderness.   Lymphadenopathy:     She has no cervical adenopathy.   Neurological: She displays normal reflexes. No cranial nerve deficit.   Skin: No rash noted.   Psychiatric: Her behavior is normal.   Nursing note and vitals reviewed.      Assessment/Plan   Kelly was seen today for heartburn.    Diagnoses and all orders for this visit:    Heartburn  -     Ambulatory Referral to Gastroenterology

## 2017-10-15 DIAGNOSIS — M81.0 OSTEOPOROSIS: ICD-10-CM

## 2017-10-15 RX ORDER — IBANDRONATE SODIUM 150 MG/1
TABLET, FILM COATED ORAL
Qty: 12 TABLET | Refills: 0 | Status: SHIPPED | OUTPATIENT
Start: 2017-10-15 | End: 2018-02-08 | Stop reason: SDUPTHER

## 2017-11-24 DIAGNOSIS — F41.1 ANXIETY AS ACUTE REACTION TO EXCEPTIONAL STRESS: ICD-10-CM

## 2017-11-24 DIAGNOSIS — F43.0 ANXIETY AS ACUTE REACTION TO EXCEPTIONAL STRESS: ICD-10-CM

## 2018-02-05 ENCOUNTER — TELEPHONE (OUTPATIENT)
Dept: INTERNAL MEDICINE | Facility: CLINIC | Age: 63
End: 2018-02-05

## 2018-02-05 NOTE — TELEPHONE ENCOUNTER
PT WOULD LIKE TO KNOW IS SHE CAN HAVE AN INCREASE IN HER SERTRALINE. WOULD LIKE TO KNOW IF SHE NEEDS AN APPT FOR THIS REQUEST

## 2018-02-08 ENCOUNTER — OFFICE VISIT (OUTPATIENT)
Dept: INTERNAL MEDICINE | Facility: CLINIC | Age: 63
End: 2018-02-08

## 2018-02-08 ENCOUNTER — TRANSCRIBE ORDERS (OUTPATIENT)
Dept: ADMINISTRATIVE | Facility: HOSPITAL | Age: 63
End: 2018-02-08

## 2018-02-08 VITALS
HEIGHT: 66 IN | HEART RATE: 74 BPM | SYSTOLIC BLOOD PRESSURE: 126 MMHG | DIASTOLIC BLOOD PRESSURE: 76 MMHG | WEIGHT: 158.6 LBS | BODY MASS INDEX: 25.49 KG/M2

## 2018-02-08 DIAGNOSIS — Z12.31 VISIT FOR SCREENING MAMMOGRAM: Primary | ICD-10-CM

## 2018-02-08 DIAGNOSIS — F32.89 OTHER DEPRESSION: ICD-10-CM

## 2018-02-08 DIAGNOSIS — R53.83 OTHER FATIGUE: Primary | ICD-10-CM

## 2018-02-08 LAB
ALBUMIN SERPL-MCNC: 4.4 G/DL (ref 3.2–4.8)
ALBUMIN/GLOB SERPL: 1.7 G/DL (ref 1.5–2.5)
ALP SERPL-CCNC: 90 U/L (ref 25–100)
ALT SERPL W P-5'-P-CCNC: 19 U/L (ref 7–40)
ANION GAP SERPL CALCULATED.3IONS-SCNC: 4 MMOL/L (ref 3–11)
AST SERPL-CCNC: 19 U/L (ref 0–33)
BASOPHILS # BLD AUTO: 0.01 10*3/MM3 (ref 0–0.2)
BASOPHILS NFR BLD AUTO: 0.2 % (ref 0–1)
BILIRUB SERPL-MCNC: 1 MG/DL (ref 0.3–1.2)
BUN BLD-MCNC: 22 MG/DL (ref 9–23)
BUN/CREAT SERPL: 27.5 (ref 7–25)
CALCIUM SPEC-SCNC: 9.2 MG/DL (ref 8.7–10.4)
CHLORIDE SERPL-SCNC: 105 MMOL/L (ref 99–109)
CO2 SERPL-SCNC: 30 MMOL/L (ref 20–31)
CREAT BLD-MCNC: 0.8 MG/DL (ref 0.6–1.3)
DEPRECATED RDW RBC AUTO: 42.4 FL (ref 37–54)
EOSINOPHIL # BLD AUTO: 0.1 10*3/MM3 (ref 0–0.3)
EOSINOPHIL NFR BLD AUTO: 1.6 % (ref 0–3)
ERYTHROCYTE [DISTWIDTH] IN BLOOD BY AUTOMATED COUNT: 12.5 % (ref 11.3–14.5)
GFR SERPL CREATININE-BSD FRML MDRD: 73 ML/MIN/1.73
GLOBULIN UR ELPH-MCNC: 2.6 GM/DL
GLUCOSE BLD-MCNC: 88 MG/DL (ref 70–100)
HCT VFR BLD AUTO: 43.3 % (ref 34.5–44)
HGB BLD-MCNC: 14.4 G/DL (ref 11.5–15.5)
IMM GRANULOCYTES # BLD: 0.01 10*3/MM3 (ref 0–0.03)
IMM GRANULOCYTES NFR BLD: 0.2 % (ref 0–0.6)
LYMPHOCYTES # BLD AUTO: 1.38 10*3/MM3 (ref 0.6–4.8)
LYMPHOCYTES NFR BLD AUTO: 22.5 % (ref 24–44)
MCH RBC QN AUTO: 30.7 PG (ref 27–31)
MCHC RBC AUTO-ENTMCNC: 33.3 G/DL (ref 32–36)
MCV RBC AUTO: 92.3 FL (ref 80–99)
MONOCYTES # BLD AUTO: 0.51 10*3/MM3 (ref 0–1)
MONOCYTES NFR BLD AUTO: 8.3 % (ref 0–12)
NEUTROPHILS # BLD AUTO: 4.12 10*3/MM3 (ref 1.5–8.3)
NEUTROPHILS NFR BLD AUTO: 67.2 % (ref 41–71)
PLATELET # BLD AUTO: 262 10*3/MM3 (ref 150–450)
PMV BLD AUTO: 10 FL (ref 6–12)
POTASSIUM BLD-SCNC: 5 MMOL/L (ref 3.5–5.5)
PROT SERPL-MCNC: 7 G/DL (ref 5.7–8.2)
RBC # BLD AUTO: 4.69 10*6/MM3 (ref 3.89–5.14)
SODIUM BLD-SCNC: 139 MMOL/L (ref 132–146)
TSH SERPL DL<=0.05 MIU/L-ACNC: 2.48 MIU/ML (ref 0.35–5.35)
WBC NRBC COR # BLD: 6.13 10*3/MM3 (ref 3.5–10.8)

## 2018-02-08 PROCEDURE — 85025 COMPLETE CBC W/AUTO DIFF WBC: CPT | Performed by: INTERNAL MEDICINE

## 2018-02-08 PROCEDURE — 84443 ASSAY THYROID STIM HORMONE: CPT | Performed by: INTERNAL MEDICINE

## 2018-02-08 PROCEDURE — 80053 COMPREHEN METABOLIC PANEL: CPT | Performed by: INTERNAL MEDICINE

## 2018-02-08 PROCEDURE — 99213 OFFICE O/P EST LOW 20 MIN: CPT | Performed by: INTERNAL MEDICINE

## 2018-02-08 RX ORDER — SERTRALINE HYDROCHLORIDE 100 MG/1
100 TABLET, FILM COATED ORAL DAILY
Qty: 30 TABLET | Refills: 1 | Status: SHIPPED | OUTPATIENT
Start: 2018-02-08 | End: 2018-04-19 | Stop reason: SDUPTHER

## 2018-02-08 NOTE — PROGRESS NOTES
"Subjective   Kelly Strickland is a 62 y.o. female.   Chief Complaint   Patient presents with   • Follow-up     F/u visit, would like to discuss if any changes need to be made to Zoloft       History of Present Illness   Over last month had increase in fatigue and not wanting to get out of bed. Not wanting to do things. No crying episodes. No suicidal or homicidal ideations.  No chest pain today.  Some weight loss but she has made dietary changes.  No fever or chills. No pain.  The following portions of the patient's history were reviewed and updated as appropriate: allergies, current medications, past family history, past medical history, past social history, past surgical history and problem list.    Review of Systems   Constitutional: Positive for fatigue. Negative for activity change, appetite change, chills, diaphoresis and fever.   HENT: Negative for congestion, ear discharge, ear pain, mouth sores, nosebleeds, sinus pressure, sneezing and sore throat.    Eyes: Negative for pain, discharge and itching.   Respiratory: Negative for cough, chest tightness, shortness of breath and wheezing.    Cardiovascular: Negative for chest pain, palpitations and leg swelling.   Gastrointestinal: Negative for abdominal pain, constipation, diarrhea, nausea and vomiting.   Endocrine: Negative for cold intolerance, heat intolerance, polydipsia and polyphagia.   Genitourinary: Negative for dysuria, flank pain, frequency, hematuria and urgency.   Musculoskeletal: Negative for arthralgias, back pain, gait problem, myalgias, neck pain and neck stiffness.   Skin: Negative for color change, pallor and rash.   Neurological: Negative for seizures, speech difficulty, numbness and headaches.   Psychiatric/Behavioral: Negative for agitation, confusion, decreased concentration and sleep disturbance. The patient is not nervous/anxious.         Not wanting to get out of bed   /76  Pulse 74  Ht 167.6 cm (66\")  Wt 71.9 kg (158 lb 9.6 oz) "  BMI 25.6 kg/m2      Objective   Physical Exam   Constitutional: She is oriented to person, place, and time. She appears well-developed.   HENT:   Head: Normocephalic.   Right Ear: External ear normal.   Left Ear: External ear normal.   Nose: Nose normal.   Mouth/Throat: Oropharynx is clear and moist.   Eyes: Conjunctivae are normal. Pupils are equal, round, and reactive to light.   Neck: No JVD present. No thyromegaly present.   Cardiovascular: Normal rate, regular rhythm and normal heart sounds.  Exam reveals no friction rub.    No murmur heard.  Pulmonary/Chest: Effort normal and breath sounds normal. No respiratory distress. She has no wheezes. She has no rales.   Abdominal: Soft. She exhibits no distension. There is no tenderness. There is no guarding.   Musculoskeletal: She exhibits no edema or tenderness.   Lymphadenopathy:     She has no cervical adenopathy.   Neurological: She is oriented to person, place, and time. She displays normal reflexes. No cranial nerve deficit.   Skin: No rash noted.   Psychiatric: Her behavior is normal.   Nursing note and vitals reviewed.      Assessment/Plan   Kelly was seen today for follow-up.    Diagnoses and all orders for this visit:    Other fatigue  -     CBC & Differential  -     Comprehensive Metabolic Panel  -     TSH    Other depression  Increase Zoloft to 100 mg po qd. 3 week f/u  Other orders  -     sertraline (ZOLOFT) 100 MG tablet; Take 1 tablet by mouth Daily.      Did remind her that she is past due for her mammogram and she agrees to call and schedule

## 2018-03-02 ENCOUNTER — OFFICE VISIT (OUTPATIENT)
Dept: INTERNAL MEDICINE | Facility: CLINIC | Age: 63
End: 2018-03-02

## 2018-03-02 VITALS
BODY MASS INDEX: 25.82 KG/M2 | SYSTOLIC BLOOD PRESSURE: 132 MMHG | HEART RATE: 78 BPM | DIASTOLIC BLOOD PRESSURE: 78 MMHG | OXYGEN SATURATION: 99 % | WEIGHT: 160 LBS

## 2018-03-02 DIAGNOSIS — F32.89 OTHER DEPRESSION: Primary | ICD-10-CM

## 2018-03-02 PROCEDURE — 99213 OFFICE O/P EST LOW 20 MIN: CPT | Performed by: INTERNAL MEDICINE

## 2018-03-02 NOTE — PROGRESS NOTES
Subjective   Kelly Strickland is a 63 y.o. female.   Chief Complaint   Patient presents with   • Follow-up     depression       History of Present Illness   3 week f/u on depression. At last visit Zoloft was increased to 100mg.  The following portions of the patient's history were reviewed and updated as appropriate: allergies, current medications, past family history, past medical history, past social history, past surgical history and problem list.    Review of Systems   Constitutional: Negative for activity change, appetite change, chills, diaphoresis, fatigue, fever and unexpected weight change.   HENT: Negative for congestion, ear discharge, ear pain, mouth sores, nosebleeds, sinus pressure, sneezing and sore throat.    Eyes: Negative for pain, discharge and itching.   Respiratory: Negative for cough, chest tightness, shortness of breath and wheezing.    Cardiovascular: Negative for chest pain, palpitations and leg swelling.   Gastrointestinal: Negative for abdominal pain, constipation, diarrhea, nausea and vomiting.   Endocrine: Negative for cold intolerance, heat intolerance, polydipsia and polyphagia.   Genitourinary: Negative for dysuria, flank pain, frequency, hematuria and urgency.   Musculoskeletal: Negative for arthralgias, back pain, gait problem, myalgias, neck pain and neck stiffness.   Skin: Negative for color change, pallor and rash.   Neurological: Negative for seizures, speech difficulty, numbness and headaches.   Psychiatric/Behavioral: Negative for agitation, confusion, decreased concentration and sleep disturbance. The patient is not nervous/anxious.    /78  Pulse 78  Wt 72.6 kg (160 lb)  SpO2 99%  BMI 25.82 kg/m2      Objective   Physical Exam   Constitutional: She appears well-developed.   HENT:   Head: Normocephalic.   Right Ear: External ear normal.   Left Ear: External ear normal.   Nose: Nose normal.   Mouth/Throat: Oropharynx is clear and moist.   Eyes: Conjunctivae are  normal. Pupils are equal, round, and reactive to light.   Neck: No JVD present. No thyromegaly present.   Cardiovascular: Normal rate, regular rhythm and normal heart sounds.  Exam reveals no friction rub.    No murmur heard.  Pulmonary/Chest: Effort normal and breath sounds normal. No respiratory distress. She has no wheezes. She has no rales.   Abdominal: She exhibits no distension. There is no tenderness. There is no guarding.   Musculoskeletal: She exhibits no edema or tenderness.   Lymphadenopathy:     She has no cervical adenopathy.   Neurological: She displays normal reflexes. No cranial nerve deficit.   Skin: No rash noted.   Psychiatric: Her behavior is normal.   Nursing note and vitals reviewed.      Assessment/Plan   Kelly was seen today for follow-up.    Diagnoses and all orders for this visit:    Other depression    improving with the increase to 100 mg of Zoloft.

## 2018-03-15 ENCOUNTER — HOSPITAL ENCOUNTER (OUTPATIENT)
Dept: MAMMOGRAPHY | Facility: HOSPITAL | Age: 63
Discharge: HOME | End: 2018-03-15
Attending: INTERNAL MEDICINE | Admitting: INTERNAL MEDICINE

## 2018-03-15 DIAGNOSIS — Z12.31 VISIT FOR SCREENING MAMMOGRAM: ICD-10-CM

## 2018-03-15 PROCEDURE — 77063 BREAST TOMOSYNTHESIS BI: CPT | Performed by: RADIOLOGY

## 2018-03-15 PROCEDURE — 77063 BREAST TOMOSYNTHESIS BI: CPT

## 2018-03-15 PROCEDURE — 77067 SCR MAMMO BI INCL CAD: CPT | Performed by: RADIOLOGY

## 2018-03-15 PROCEDURE — 77067 SCR MAMMO BI INCL CAD: CPT

## 2018-04-19 RX ORDER — SERTRALINE HYDROCHLORIDE 100 MG/1
100 TABLET, FILM COATED ORAL DAILY
Qty: 30 TABLET | Refills: 1 | Status: SHIPPED | OUTPATIENT
Start: 2018-04-19 | End: 2018-06-16 | Stop reason: SDUPTHER

## 2018-06-16 RX ORDER — SERTRALINE HYDROCHLORIDE 100 MG/1
100 TABLET, FILM COATED ORAL DAILY
Qty: 30 TABLET | Refills: 1 | Status: SHIPPED | OUTPATIENT
Start: 2018-06-16 | End: 2018-07-05 | Stop reason: SDUPTHER

## 2018-07-05 ENCOUNTER — OFFICE VISIT (OUTPATIENT)
Dept: INTERNAL MEDICINE | Facility: CLINIC | Age: 63
End: 2018-07-05

## 2018-07-05 VITALS
WEIGHT: 168.5 LBS | SYSTOLIC BLOOD PRESSURE: 100 MMHG | HEART RATE: 70 BPM | BODY MASS INDEX: 27.2 KG/M2 | DIASTOLIC BLOOD PRESSURE: 80 MMHG | OXYGEN SATURATION: 98 %

## 2018-07-05 DIAGNOSIS — Z00.00 HEALTHCARE MAINTENANCE: Primary | ICD-10-CM
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
## 2018-07-05 DIAGNOSIS — M81.0 OSTEOPOROSIS, UNSPECIFIED OSTEOPOROSIS TYPE, UNSPECIFIED PATHOLOGICAL FRACTURE PRESENCE: ICD-10-CM

## 2018-07-05 LAB
ALBUMIN SERPL-MCNC: 4.68 G/DL (ref 3.2–4.8)
ALBUMIN/GLOB SERPL: 1.7 G/DL (ref 1.5–2.5)
ALP SERPL-CCNC: 104 U/L (ref 25–100)
ALT SERPL W P-5'-P-CCNC: 27 U/L (ref 7–40)
ANION GAP SERPL CALCULATED.3IONS-SCNC: 4 MMOL/L (ref 3–11)
ARTICHOKE IGE QN: 141 MG/DL (ref 0–130)
AST SERPL-CCNC: 22 U/L (ref 0–33)
BASOPHILS # BLD AUTO: 0.01 10*3/MM3 (ref 0–0.2)
BASOPHILS NFR BLD AUTO: 0.2 % (ref 0–1)
BILIRUB BLD-MCNC: NEGATIVE MG/DL
BILIRUB SERPL-MCNC: 1.2 MG/DL (ref 0.3–1.2)
BUN BLD-MCNC: 15 MG/DL (ref 9–23)
BUN/CREAT SERPL: 18.3 (ref 7–25)
CALCIUM SPEC-SCNC: 9.3 MG/DL (ref 8.7–10.4)
CHLORIDE SERPL-SCNC: 107 MMOL/L (ref 99–109)
CHOLEST SERPL-MCNC: 236 MG/DL (ref 0–200)
CLARITY, POC: CLEAR
CO2 SERPL-SCNC: 31 MMOL/L (ref 20–31)
COLOR UR: YELLOW
CREAT BLD-MCNC: 0.82 MG/DL (ref 0.6–1.3)
DEPRECATED RDW RBC AUTO: 45.1 FL (ref 37–54)
EOSINOPHIL # BLD AUTO: 0.15 10*3/MM3 (ref 0–0.3)
EOSINOPHIL NFR BLD AUTO: 2.4 % (ref 0–3)
ERYTHROCYTE [DISTWIDTH] IN BLOOD BY AUTOMATED COUNT: 13.1 % (ref 11.3–14.5)
GFR SERPL CREATININE-BSD FRML MDRD: 70 ML/MIN/1.73
GLOBULIN UR ELPH-MCNC: 2.7 GM/DL
GLUCOSE BLD-MCNC: 83 MG/DL (ref 70–100)
GLUCOSE UR STRIP-MCNC: NEGATIVE MG/DL
HCT VFR BLD AUTO: 43.9 % (ref 34.5–44)
HDLC SERPL-MCNC: 87 MG/DL (ref 40–60)
HGB BLD-MCNC: 14.4 G/DL (ref 11.5–15.5)
IMM GRANULOCYTES # BLD: 0.02 10*3/MM3 (ref 0–0.03)
IMM GRANULOCYTES NFR BLD: 0.3 % (ref 0–0.6)
KETONES UR QL: NEGATIVE
LEUKOCYTE EST, POC: ABNORMAL
LYMPHOCYTES # BLD AUTO: 1.65 10*3/MM3 (ref 0.6–4.8)
LYMPHOCYTES NFR BLD AUTO: 26.9 % (ref 24–44)
MCH RBC QN AUTO: 31 PG (ref 27–31)
MCHC RBC AUTO-ENTMCNC: 32.8 G/DL (ref 32–36)
MCV RBC AUTO: 94.4 FL (ref 80–99)
MONOCYTES # BLD AUTO: 0.42 10*3/MM3 (ref 0–1)
MONOCYTES NFR BLD AUTO: 6.9 % (ref 0–12)
NEUTROPHILS # BLD AUTO: 3.9 10*3/MM3 (ref 1.5–8.3)
NEUTROPHILS NFR BLD AUTO: 63.6 % (ref 41–71)
NITRITE UR-MCNC: NEGATIVE MG/ML
PH UR: 7 [PH] (ref 5–8)
PLATELET # BLD AUTO: 287 10*3/MM3 (ref 150–450)
PMV BLD AUTO: 10.2 FL (ref 6–12)
POTASSIUM BLD-SCNC: 5 MMOL/L (ref 3.5–5.5)
PROT SERPL-MCNC: 7.4 G/DL (ref 5.7–8.2)
PROT UR STRIP-MCNC: NEGATIVE MG/DL
RBC # BLD AUTO: 4.65 10*6/MM3 (ref 3.89–5.14)
RBC # UR STRIP: NEGATIVE /UL
SODIUM BLD-SCNC: 142 MMOL/L (ref 132–146)
SP GR UR: 1 (ref 1–1.03)
TRIGL SERPL-MCNC: 82 MG/DL (ref 0–150)
TSH SERPL DL<=0.05 MIU/L-ACNC: 2.98 MIU/ML (ref 0.35–5.35)
UROBILINOGEN UR QL: NORMAL
WBC NRBC COR # BLD: 6.13 10*3/MM3 (ref 3.5–10.8)

## 2018-07-05 PROCEDURE — 81003 URINALYSIS AUTO W/O SCOPE: CPT | Performed by: INTERNAL MEDICINE

## 2018-07-05 PROCEDURE — 80053 COMPREHEN METABOLIC PANEL: CPT | Performed by: INTERNAL MEDICINE

## 2018-07-05 PROCEDURE — 99396 PREV VISIT EST AGE 40-64: CPT | Performed by: INTERNAL MEDICINE

## 2018-07-05 PROCEDURE — 85025 COMPLETE CBC W/AUTO DIFF WBC: CPT | Performed by: INTERNAL MEDICINE

## 2018-07-05 PROCEDURE — 84443 ASSAY THYROID STIM HORMONE: CPT | Performed by: INTERNAL MEDICINE

## 2018-07-05 PROCEDURE — 80061 LIPID PANEL: CPT | Performed by: INTERNAL MEDICINE

## 2018-07-05 RX ORDER — SERTRALINE HYDROCHLORIDE 100 MG/1
100 TABLET, FILM COATED ORAL DAILY
Qty: 30 TABLET | Refills: 5 | Status: SHIPPED | OUTPATIENT
Start: 2018-07-05 | End: 2019-02-28 | Stop reason: SDUPTHER

## 2018-07-05 NOTE — PROGRESS NOTES
Chief Complaint   Patient presents with   • Annual Exam       Falls once a month for last few months.   Reported Health  Good Yes  FairNo  PoorNo      Dental,Vision,Hearing  Regular dental visitsYes  Vision ProblemsYes  Hearing LossNo      Immunization Status:  Up To DateNo        Lifestyle  Healthy DietYes  Weight ConcernsYes  Regular ExerciseNo  Tobacco UseNo  Alcohol UseYes  Drug AbuseNo      Screening  Cancer ScreeningYes  Metabolic ScreeningYes  Risk ScreeningYes      Review of Systems   Constitutional: Negative for activity change, appetite change, chills, diaphoresis, fatigue, fever and unexpected weight change.   HENT: Negative for congestion, ear discharge, ear pain, mouth sores, nosebleeds, sinus pressure, sneezing and sore throat.    Eyes: Negative for pain, discharge and itching.   Respiratory: Negative for cough, chest tightness, shortness of breath and wheezing.    Cardiovascular: Negative for chest pain, palpitations and leg swelling.   Gastrointestinal: Negative for abdominal pain, constipation, diarrhea, nausea and vomiting.   Endocrine: Negative for cold intolerance, heat intolerance, polydipsia and polyphagia.   Genitourinary: Negative for dysuria, flank pain, frequency, hematuria and urgency.   Musculoskeletal: Negative for arthralgias, back pain, gait problem, myalgias, neck pain and neck stiffness.   Skin: Negative for color change, pallor and rash.   Neurological: Negative for seizures, speech difficulty, numbness and headaches.        Falls   Psychiatric/Behavioral: Negative for agitation, confusion, decreased concentration and sleep disturbance. The patient is not nervous/anxious.      /80   Pulse 70   Wt 76.4 kg (168 lb 8 oz)   SpO2 98%   BMI 27.20 kg/m²     Physical Exam   Constitutional: She is oriented to person, place, and time. She appears well-developed.   HENT:   Head: Normocephalic.   Right Ear: External ear normal.   Left Ear: External ear normal.   Nose: Nose normal.    Mouth/Throat: Oropharynx is clear and moist.   Eyes: Conjunctivae are normal. Pupils are equal, round, and reactive to light.   Neck: No JVD present. No thyromegaly present.   Cardiovascular: Normal rate, regular rhythm and normal heart sounds.  Exam reveals no friction rub.    No murmur heard.  Pulmonary/Chest: Effort normal and breath sounds normal. No respiratory distress. She has no wheezes. She has no rales.   Abdominal: Soft. Bowel sounds are normal. She exhibits no distension. There is no tenderness. There is no guarding.   Musculoskeletal: She exhibits no edema or tenderness.   Lymphadenopathy:     She has no cervical adenopathy.   Neurological: She is oriented to person, place, and time. She displays normal reflexes. No cranial nerve deficit.   Skin: No rash noted.   Psychiatric: Her behavior is normal.   Nursing note and vitals reviewed.                Diet and Exercise    Healthy Diet Yes  Adequate DietYes  Poor DietNo  Adequate Exercise RegimenNo  Inadequate Exercise RegimenYes      Cervical Cancer Screening    Risks and benefits discussedYes  Screening currentYes  PAP Done Today OrderedNo  Screening Not IndicatedNo  Pap Every 3 yearsYes  Screening Done By GYNNo    Breast Cancer screening  Risks and Benefits DiscussedYes  Self Breast Exam taughtNo  Monthly Self Exam AdvisedNo  Screening CurrentYes  Mammogram OrderedNo  Screening Not IndicatedNo  Screening Managed By GYNNo  Patient DeclinesNo      STD Testing  ChlamydiaNo  GonorrheaNo  HIVNo      Osteoporosis Screening  Risks And Benefits DiscussedYes  BMD CurrentYes  BMD orderedNo  Patient DeclinesNo    Colorectal Cancer Screening  Risks and Benefits DiscussedYes  Screening currentYes  FOBT Supplies givenNo  FOBT Every YearNo  Colonoscopy OrderedNo  Colonoscopy every 5 yearsYes  Colonoscopy every 10 yearsNo  Screening not indicatedNo  Patient declinesNo    Metabolic  Screening  GlucoseYes  LipidsYes  CBCYes  TSHYes  UAYes  CMPYes  25OHNo      Immunizations  Risks and benefits discussedYes  Immunizations Up To DateNo  Immunizations NeededYes  Immunizations Per OrdersNo  Patient DNoeclines    New Shingles vac at pharm  Preventative Counseling  NutritionYes  Aerobic ExerciseYes  Weight Bearing ExerciseYes  Weight LossYes  Calcium SupplementsYes  Vitamin D SupplementsYes  Reproductive HealthNo  Cardiovascular Risk ReductionYes  Tobacco CessationNo  Alcohol UseYes  Sunscreen UseYes  Self Skin ExaminationNo  Helmet UseNo  Seat Belt UseYes  Fall Risk ReductionNo  Advanced Directive PlanningNo      Patient Discussion  PatientYes  FamilyNo  CounselingYes  Kelly was seen today for annual exam.    Diagnoses and all orders for this visit:    Healthcare maintenance  -     CBC & Differential  -     Comprehensive Metabolic Panel  -     Lipid Panel  -     TSH    Osteoporosis, unspecified osteoporosis type, unspecified pathological fracture presence    Other orders  -     sertraline (ZOLOFT) 100 MG tablet; Take 1 tablet by mouth Daily.

## 2018-07-09 ENCOUNTER — APPOINTMENT (OUTPATIENT)
Dept: CT IMAGING | Facility: HOSPITAL | Age: 63
End: 2018-07-09
Attending: INTERNAL MEDICINE

## 2019-02-11 ENCOUNTER — TELEPHONE (OUTPATIENT)
Dept: INTERNAL MEDICINE | Facility: CLINIC | Age: 64
End: 2019-02-11

## 2019-02-11 DIAGNOSIS — Z20.828 EXPOSURE TO THE FLU: Primary | ICD-10-CM

## 2019-02-11 RX ORDER — OSELTAMIVIR PHOSPHATE 75 MG/1
75 CAPSULE ORAL DAILY
Qty: 10 CAPSULE | Refills: 0 | Status: SHIPPED | OUTPATIENT
Start: 2019-02-11 | End: 2019-02-21

## 2019-02-11 NOTE — TELEPHONE ENCOUNTER
PATIENT WAS EXPOSED TO THE FLU FROM HER DAUGHTER AND GRANDDAUGHTER, SHE WAS WANTING TO KNOW IF YOU WOULD CALL HER  IN [place] TO [facility] ON [address]. PT CAN BE REACHED [phone]

## 2019-02-28 RX ORDER — SERTRALINE HYDROCHLORIDE 100 MG/1
TABLET, FILM COATED ORAL
Qty: 30 TABLET | Refills: 5 | Status: SHIPPED | OUTPATIENT
Start: 2019-02-28 | End: 2019-11-04 | Stop reason: SDUPTHER

## 2019-05-02 ENCOUNTER — TRANSCRIBE ORDERS (OUTPATIENT)
Dept: PHYSICAL THERAPY | Facility: CLINIC | Age: 64
End: 2019-05-02

## 2019-05-02 DIAGNOSIS — M65.4 DE QUERVAIN'S TENOSYNOVITIS, RIGHT: Primary | ICD-10-CM

## 2019-05-06 ENCOUNTER — TREATMENT (OUTPATIENT)
Dept: PHYSICAL THERAPY | Facility: CLINIC | Age: 64
End: 2019-05-06

## 2019-05-06 DIAGNOSIS — M65.4 DE QUERVAIN'S DISEASE (TENOSYNOVITIS): Primary | ICD-10-CM

## 2019-05-06 PROCEDURE — 97022 WHIRLPOOL THERAPY: CPT | Performed by: PHYSICAL THERAPIST

## 2019-05-06 PROCEDURE — 97140 MANUAL THERAPY 1/> REGIONS: CPT | Performed by: PHYSICAL THERAPIST

## 2019-05-06 PROCEDURE — 97035 APP MDLTY 1+ULTRASOUND EA 15: CPT | Performed by: PHYSICAL THERAPIST

## 2019-05-06 PROCEDURE — 97014 ELECTRIC STIMULATION THERAPY: CPT | Performed by: PHYSICAL THERAPIST

## 2019-05-06 PROCEDURE — 97161 PT EVAL LOW COMPLEX 20 MIN: CPT | Performed by: PHYSICAL THERAPIST

## 2019-05-06 NOTE — PROGRESS NOTES
Physical Therapy Initial Evaluation and Plan of Care        Patient: Kelly Strickland   : 1955  Diagnosis/ICD-10 Code:  De Quervain's disease (tenosynovitis) [M65.4]  Referring practitioner: Sarah Mathur MD  Date of Initial Visit: 2019  Today's Date: 2019  Patient seen for 1 sessions           Subjective Evaluation    History of Present Illness  Mechanism of injury: About 1-2 year ago started experiencing right radial wrist pain, received an injection, helped about 9 months. Received 2nd injection this past Dec/.  Helped about 3-4 months. Received 3rd injection last week. Has purchased an OTC Thumb spica splint.  Last injection as helped some.    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    PMH: Left mid to distal fracture with ORIF, by Dr Jimenes about 2 years ago.      Subjective comment: Right DeQuervain's SyndromeQuality of life: good    Pain  Current pain rating: 3  At best pain ratin  At worst pain ratin  Location: Pain along 1st dorsal compartment/APL/EPB.  Occasionally pain would radiate up to anterior/lateral proximal left elbow.  Relieving factors: rest (Injection)  Aggravating factors: repetitive movement and sleeping (Reaching, cooking, lifting.)  Progression: improved    Hand dominance: right    Treatments  Previous treatment: injection treatment           Objective       Observations     Right Wrist/Hand   Positive for edema (1st dorsal compartment at radial styloid process).     Tenderness     Right Wrist/Hand   Tenderness in the first dorsal compartment (At radial styloid process and proximal along tendon sheath).     Neurological Testing     Sensation     Wrist/Hand     Right   Intact: light touch, pin prick and sharp/dull discrimination    Active Range of Motion     Right Wrist   Wrist flexion: 85 degrees   Wrist extension: 90 degrees   Radial deviation: 20 degrees   Ulnar deviation: 45 degrees with pain    Strength/Myotome Testing     Left Wrist/Hand       (2nd hand position)   lbs: 66    Thumb Strength  Key/Lateral Pinch     Trial 1: 16 lbs  Tip/Two-Point Pinch     Trial 1: 12 lbs  Palmar/Three-Point Pinch     Trial 1: 14 lbs    Right Wrist/Hand      (2nd hand position)   lbs: 65    Thumb Strength   Key/Lateral Pinch     Trial 1: 19 lbs  Tip/Two-Point Pinch     Trial 1: 12 lbs  Palmar/Three-Point Pinch     Trial 1: 19 lbs    Tests     Right Wrist/Hand   Positive Finkelstein's.          Assessment & Plan     Assessment  Impairments: activity intolerance, impaired physical strength, lacks appropriate home exercise program and pain with function  Other impairment: QUICK DASH 45.45  Assessment details: The Client is a 64 year female with a non-work related injury to the right wrist, forearm, and thumb due repeated activities at home.  The patient presents with deQuervain's Syndrome/1st dorsal compartment tenosynovitis and a wrist sprain.  Right has significant swelling & tenderness along the EPL & ABL/EPB tendons.  Neurological exam is normal today.  Problems:  pain, decreased ADL activities, decrease strength, decreased work status  Prognosis: fair  Prognosis details:   GOAL  STG to be met in 3 weeks  1. Decrease worst pain to 3/10.  2. 50% reduction in edema at wrist  3. Pt. able to do all ADLs without increasing thumb and wrist discomfort.  4. Pt. has a negative Finkelstein's Test.  LTG to be met in 6 weeks  1. Pt. remains functional with all hand activities.   2. Pt. no longer needs to wear splints.  3. Pt. is independent with all HEP.  4. Pt. is able to activities with less disability, as noted on Quick Dash Score of 20 or less..  5. Increase right  to 73 lbs.   Functional Limitations: carrying objects, lifting, pulling, pushing, uncomfortable because of pain, reaching overhead and unable to perform repetitive tasks  Plan  Therapy options: will be seen for skilled physical therapy services  Planned modality interventions: cryotherapy, high voltage  pulsed current (pain management), fluidotherapy, iontophoresis and ultrasound  Other planned modality interventions: KT tape  Planned therapy interventions: fine motor coordination training, flexibility, functional ROM exercises, home exercise program, joint mobilization, manual therapy, neuromuscular re-education, orthotic fitting/training, soft tissue mobilization, strengthening, stretching and therapeutic activities  Frequency: 2x week  Duration in weeks: 6        Manual Therapy:    10     mins  54310;  Therapeutic Exercise:         mins  89807;     Neuromuscular Alysha:        mins  94634;    Therapeutic Activity:          mins  76610;     Gait Training:           mins  85518;     Ultrasound:     13     mins  90589;    Electrical Stimulation:    20     mins  80650 ( );  Iontophoresis          mins 62669   Traction          mins  74891  Fluidotherapy     15     mins  68136  Dry Needling          mins self-pay  Traction          mins  07262    Timed Treatment:   23   mins   Total Treatment:     78   mins    PT SIGNATURE: Vega Umana PT, T   DATE TREATMENT INITIATED: 5/6/2019    Initial Certification  Certification Period: 8/4/2019  I certify that the therapy services are furnished while this patient is under my care.  The services outlined above are required by this patient, and will be reviewed every 90 days.     PHYSICIAN: Sarah Mathur MD      DATE:     Please sign and return via fax to [phone].. Thank you, [facility].

## 2019-05-09 ENCOUNTER — TREATMENT (OUTPATIENT)
Dept: PHYSICAL THERAPY | Facility: CLINIC | Age: 64
End: 2019-05-09

## 2019-05-09 DIAGNOSIS — M65.4 DE QUERVAIN'S DISEASE (TENOSYNOVITIS): Primary | ICD-10-CM

## 2019-05-09 PROCEDURE — 97140 MANUAL THERAPY 1/> REGIONS: CPT | Performed by: PHYSICAL THERAPIST

## 2019-05-09 PROCEDURE — 97022 WHIRLPOOL THERAPY: CPT | Performed by: PHYSICAL THERAPIST

## 2019-05-09 PROCEDURE — 97014 ELECTRIC STIMULATION THERAPY: CPT | Performed by: PHYSICAL THERAPIST

## 2019-05-09 PROCEDURE — 97035 APP MDLTY 1+ULTRASOUND EA 15: CPT | Performed by: PHYSICAL THERAPIST

## 2019-05-14 ENCOUNTER — TREATMENT (OUTPATIENT)
Dept: PHYSICAL THERAPY | Facility: CLINIC | Age: 64
End: 2019-05-14

## 2019-05-14 DIAGNOSIS — M65.4 DE QUERVAIN'S DISEASE (TENOSYNOVITIS): Primary | ICD-10-CM

## 2019-05-14 PROCEDURE — 97035 APP MDLTY 1+ULTRASOUND EA 15: CPT | Performed by: PHYSICAL THERAPIST

## 2019-05-14 PROCEDURE — 97014 ELECTRIC STIMULATION THERAPY: CPT | Performed by: PHYSICAL THERAPIST

## 2019-05-14 PROCEDURE — 97140 MANUAL THERAPY 1/> REGIONS: CPT | Performed by: PHYSICAL THERAPIST

## 2019-05-14 PROCEDURE — 97110 THERAPEUTIC EXERCISES: CPT | Performed by: PHYSICAL THERAPIST

## 2019-05-14 NOTE — PROGRESS NOTES
Subjective   Kelly Strickland reports: KT tape helped some. Pain along  Radial side of wrist    Objective   TEST: Finkelstein's Test +  PALPATION: Tender radial styloid process.    See Exercise, Manual, and Modality Logs for complete treatment.       Assessment/Plan  Responded well to STM today. She is less tender, but still has signs and symptoms of deQuervain's syndrome.  Progress per Plan of Care and Progress strengthening /stabilization /functional activity           Manual Therapy:    14     mins  74543;  Therapeutic Exercise:         mins  51574;     Neuromuscular Alysha:        mins  61853;    Therapeutic Activity:          mins  80037;     Gait Training:           mins  05884;     Ultrasound:     15     mins  78102;   Iontophoresis          mins  44330   Electrical Stimulation:    20     mins  28033 ( );  Dry Needling          mins self-pay  Fluidotherapy     15     mins 84629  Traction          mins 75132  Paraffin:          mins  01920    Timed Treatment:   29   mins   Total Treatment:     64   mins    Vega Umana, PT,CHT  Physical Therapist

## 2019-05-16 NOTE — PROGRESS NOTES
Subjective   Kelly Strickland reports: overall improving.  The massage is helpful. KT tape helps while it is on.    Objective   PALPATION:  at radial styloid process/1st dorsal compartment    See Exercise, Manual, and Modality Logs for complete treatment.       Assessment/Plan  Hard to determine if she is improving or the steroids have calmed things down.  Progress per Plan of Care and Progress strengthening /stabilization /functional activity           Manual Therapy:    20     mins  31902;  Therapeutic Exercise:    25     mins  97302;     Neuromuscular Alysha:        mins  68855;    Therapeutic Activity:          mins  72247;     Gait Training:           mins  40048;     Ultrasound:     15     mins  68479;   Iontophoresis          mins  55046   Electrical Stimulation:    30     mins  22277 ( );  Dry Needling          mins self-pay  Fluidotherapy          mins 04290  Traction          mins 42852  Paraffin:          mins  45158    Timed Treatment:   60   mins   Total Treatment:     90   mins    Vega Umana, PT,CHT  Physical Therapist

## 2019-05-28 ENCOUNTER — TREATMENT (OUTPATIENT)
Dept: PHYSICAL THERAPY | Facility: CLINIC | Age: 64
End: 2019-05-28

## 2019-05-28 DIAGNOSIS — M65.4 DE QUERVAIN'S DISEASE (TENOSYNOVITIS): Primary | ICD-10-CM

## 2019-05-28 PROCEDURE — 97035 APP MDLTY 1+ULTRASOUND EA 15: CPT | Performed by: PHYSICAL THERAPIST

## 2019-05-28 PROCEDURE — 97140 MANUAL THERAPY 1/> REGIONS: CPT | Performed by: PHYSICAL THERAPIST

## 2019-05-28 PROCEDURE — 97014 ELECTRIC STIMULATION THERAPY: CPT | Performed by: PHYSICAL THERAPIST

## 2019-05-28 PROCEDURE — 97110 THERAPEUTIC EXERCISES: CPT | Performed by: PHYSICAL THERAPIST

## 2019-05-30 NOTE — PROGRESS NOTES
Physical Therapy Daily Progress Note        Patient: Kelly Strickland   : 1955  Diagnosis/ICD-10 Code:  De Quervain's disease (tenosynovitis) [M65.4]  Referring practitioner: Sarah Mathur MD  Date of Initial Visit: Type: THERAPY  Noted: 2019  Today's Date: 2019  Patient seen for 4 sessions           Subjective   Kelly Strickland reports: doing a lot better. Will have to stop PT because of family issues.  Feels she can do OK with management of the wrist now.    Objective   PALPATION: Very mild tenderness of 1st dorsal compartment at radial styloid process.  OBSERVATION: no edema  ROM: WNL  TEST: Finkelstein's negative.  OTHER: Pt independent with HEP and instruction. Wear splint all time except hygiene for 1 more month, then wear it at night for additional month.  See Exercise, Manual, and Modality Logs for complete treatment.       Assessment/Plan  Pt is independent with HEP. Goals have been met. No longer needs skilled PT.  Other  D/C from PT           Manual Therapy:    20     mins  14432;  Therapeutic Exercise:    18     mins  67072;     Neuromuscular Alysha:        mins  13195;    Therapeutic Activity:          mins  21664;     Gait Training:           mins  11706;     Ultrasound:     15     mins  07794;    Electrical Stimulation:    30     mins  10453 ( );  Iontophoresis          mins 23691   Traction          mins  09451  Fluidotherapy          mins  03024  Dry Needling          mins self-pay  Paraffin          mins  53899    Timed Treatment:   53   mins   Total Treatment:     83   mins    Vega Umana, PT, CHT  Physical Therapist

## 2019-07-31 ENCOUNTER — HOSPITAL ENCOUNTER (EMERGENCY)
Facility: HOSPITAL | Age: 64
Discharge: HOME | End: 2019-07-31
Attending: EMERGENCY MEDICINE | Admitting: EMERGENCY MEDICINE

## 2019-07-31 ENCOUNTER — APPOINTMENT (OUTPATIENT)
Dept: CT IMAGING | Facility: HOSPITAL | Age: 64
End: 2019-07-31

## 2019-07-31 VITALS
HEIGHT: 65 IN | OXYGEN SATURATION: 93 % | DIASTOLIC BLOOD PRESSURE: 72 MMHG | BODY MASS INDEX: 26.66 KG/M2 | SYSTOLIC BLOOD PRESSURE: 131 MMHG | HEART RATE: 72 BPM | TEMPERATURE: 98.8 F | WEIGHT: 160 LBS | RESPIRATION RATE: 14 BRPM

## 2019-07-31 DIAGNOSIS — K57.92 DIVERTICULITIS: Primary | ICD-10-CM

## 2019-07-31 LAB
ALBUMIN SERPL-MCNC: 4 G/DL (ref 3.5–5.2)
ALBUMIN/GLOB SERPL: 1.2 G/DL
ALP SERPL-CCNC: 120 U/L (ref 39–117)
ALT SERPL W P-5'-P-CCNC: 29 U/L (ref 1–33)
ANION GAP SERPL CALCULATED.3IONS-SCNC: 10 MMOL/L (ref 5–15)
AST SERPL-CCNC: 42 U/L (ref 1–32)
BACTERIA UR QL AUTO: NORMAL /HPF
BASOPHILS # BLD AUTO: 0.02 10*3/MM3 (ref 0–0.2)
BASOPHILS NFR BLD AUTO: 0.2 % (ref 0–1.5)
BILIRUB SERPL-MCNC: 1.5 MG/DL (ref 0.2–1.2)
BILIRUB UR QL STRIP: NEGATIVE
BUN BLD-MCNC: 15 MG/DL (ref 8–23)
BUN/CREAT SERPL: 20.5 (ref 7–25)
CALCIUM SPEC-SCNC: 9.1 MG/DL (ref 8.6–10.5)
CHLORIDE SERPL-SCNC: 102 MMOL/L (ref 98–107)
CLARITY UR: CLEAR
CO2 SERPL-SCNC: 28 MMOL/L (ref 22–29)
COLOR UR: YELLOW
CREAT BLD-MCNC: 0.73 MG/DL (ref 0.57–1)
D-LACTATE SERPL-SCNC: 0.7 MMOL/L (ref 0.5–2)
DEPRECATED RDW RBC AUTO: 42.8 FL (ref 37–54)
EOSINOPHIL # BLD AUTO: 0.12 10*3/MM3 (ref 0–0.4)
EOSINOPHIL NFR BLD AUTO: 1.2 % (ref 0.3–6.2)
ERYTHROCYTE [DISTWIDTH] IN BLOOD BY AUTOMATED COUNT: 12.2 % (ref 12.3–15.4)
GFR SERPL CREATININE-BSD FRML MDRD: 80 ML/MIN/1.73
GLOBULIN UR ELPH-MCNC: 3.4 GM/DL
GLUCOSE BLD-MCNC: 99 MG/DL (ref 65–99)
GLUCOSE UR STRIP-MCNC: NEGATIVE MG/DL
HCT VFR BLD AUTO: 40.4 % (ref 34–46.6)
HGB BLD-MCNC: 13.2 G/DL (ref 12–15.9)
HGB UR QL STRIP.AUTO: NEGATIVE
HOLD SPECIMEN: NORMAL
HOLD SPECIMEN: NORMAL
HYALINE CASTS UR QL AUTO: NORMAL /LPF
IMM GRANULOCYTES # BLD AUTO: 0.04 10*3/MM3 (ref 0–0.05)
IMM GRANULOCYTES NFR BLD AUTO: 0.4 % (ref 0–0.5)
KETONES UR QL STRIP: NEGATIVE
LEUKOCYTE ESTERASE UR QL STRIP.AUTO: ABNORMAL
LIPASE SERPL-CCNC: 32 U/L (ref 13–60)
LYMPHOCYTES # BLD AUTO: 1.54 10*3/MM3 (ref 0.7–3.1)
LYMPHOCYTES NFR BLD AUTO: 15.9 % (ref 19.6–45.3)
MCH RBC QN AUTO: 30.9 PG (ref 26.6–33)
MCHC RBC AUTO-ENTMCNC: 32.7 G/DL (ref 31.5–35.7)
MCV RBC AUTO: 94.6 FL (ref 79–97)
MONOCYTES # BLD AUTO: 0.69 10*3/MM3 (ref 0.1–0.9)
MONOCYTES NFR BLD AUTO: 7.1 % (ref 5–12)
NEUTROPHILS # BLD AUTO: 7.3 10*3/MM3 (ref 1.7–7)
NEUTROPHILS NFR BLD AUTO: 75.2 % (ref 42.7–76)
NITRITE UR QL STRIP: NEGATIVE
NRBC BLD AUTO-RTO: 0 /100 WBC (ref 0–0.2)
PH UR STRIP.AUTO: 6.5 [PH] (ref 5–8)
PLATELET # BLD AUTO: 249 10*3/MM3 (ref 140–450)
PMV BLD AUTO: 8.9 FL (ref 6–12)
POTASSIUM BLD-SCNC: 3.9 MMOL/L (ref 3.5–5.2)
PROT SERPL-MCNC: 7.4 G/DL (ref 6–8.5)
PROT UR QL STRIP: NEGATIVE
RBC # BLD AUTO: 4.27 10*6/MM3 (ref 3.77–5.28)
RBC # UR: NORMAL /HPF
REF LAB TEST METHOD: NORMAL
SODIUM BLD-SCNC: 140 MMOL/L (ref 136–145)
SP GR UR STRIP: 1.01 (ref 1–1.03)
SQUAMOUS #/AREA URNS HPF: NORMAL /HPF
UROBILINOGEN UR QL STRIP: ABNORMAL
WBC NRBC COR # BLD: 9.71 10*3/MM3 (ref 3.4–10.8)
WBC UR QL AUTO: NORMAL /HPF
WHOLE BLOOD HOLD SPECIMEN: NORMAL
WHOLE BLOOD HOLD SPECIMEN: NORMAL

## 2019-07-31 PROCEDURE — 83690 ASSAY OF LIPASE: CPT | Performed by: EMERGENCY MEDICINE

## 2019-07-31 PROCEDURE — 96375 TX/PRO/DX INJ NEW DRUG ADDON: CPT

## 2019-07-31 PROCEDURE — 99284 EMERGENCY DEPT VISIT MOD MDM: CPT

## 2019-07-31 PROCEDURE — 74177 CT ABD & PELVIS W/CONTRAST: CPT

## 2019-07-31 PROCEDURE — 96365 THER/PROPH/DIAG IV INF INIT: CPT

## 2019-07-31 PROCEDURE — 80053 COMPREHEN METABOLIC PANEL: CPT | Performed by: EMERGENCY MEDICINE

## 2019-07-31 PROCEDURE — 25010000002 PIPERACILLIN SOD-TAZOBACTAM PER 1 G: Performed by: EMERGENCY MEDICINE

## 2019-07-31 PROCEDURE — 85025 COMPLETE CBC W/AUTO DIFF WBC: CPT | Performed by: EMERGENCY MEDICINE

## 2019-07-31 PROCEDURE — 25010000002 ONDANSETRON PER 1 MG: Performed by: EMERGENCY MEDICINE

## 2019-07-31 PROCEDURE — 25010000002 IOPAMIDOL 61 % SOLUTION: Performed by: EMERGENCY MEDICINE

## 2019-07-31 PROCEDURE — 81001 URINALYSIS AUTO W/SCOPE: CPT | Performed by: EMERGENCY MEDICINE

## 2019-07-31 PROCEDURE — 25010000002 HYDROMORPHONE 1 MG/ML SOLUTION: Performed by: EMERGENCY MEDICINE

## 2019-07-31 PROCEDURE — 83605 ASSAY OF LACTIC ACID: CPT | Performed by: EMERGENCY MEDICINE

## 2019-07-31 RX ORDER — ONDANSETRON 2 MG/ML
4 INJECTION INTRAMUSCULAR; INTRAVENOUS ONCE
Status: COMPLETED | OUTPATIENT
Start: 2019-07-31 | End: 2019-07-31

## 2019-07-31 RX ORDER — METRONIDAZOLE 500 MG/1
500 TABLET ORAL 3 TIMES DAILY
Qty: 30 TABLET | Refills: 0 | Status: SHIPPED | OUTPATIENT
Start: 2019-07-31 | End: 2019-11-04

## 2019-07-31 RX ORDER — SODIUM CHLORIDE 0.9 % (FLUSH) 0.9 %
10 SYRINGE (ML) INJECTION AS NEEDED
Status: DISCONTINUED | OUTPATIENT
Start: 2019-07-31 | End: 2019-07-31 | Stop reason: HOSPADM

## 2019-07-31 RX ORDER — CIPROFLOXACIN 500 MG/1
500 TABLET, FILM COATED ORAL 2 TIMES DAILY
Qty: 20 TABLET | Refills: 0 | Status: SHIPPED | OUTPATIENT
Start: 2019-07-31 | End: 2019-11-04

## 2019-07-31 RX ORDER — HYDROCODONE BITARTRATE AND ACETAMINOPHEN 5; 325 MG/1; MG/1
1 TABLET ORAL EVERY 6 HOURS PRN
Qty: 12 TABLET | Refills: 0 | Status: SHIPPED | OUTPATIENT
Start: 2019-07-31 | End: 2019-11-04

## 2019-07-31 RX ORDER — ONDANSETRON 4 MG/1
4 TABLET, FILM COATED ORAL EVERY 8 HOURS PRN
Qty: 15 TABLET | Refills: 0 | Status: SHIPPED | OUTPATIENT
Start: 2019-07-31 | End: 2019-11-04

## 2019-07-31 RX ORDER — TAMSULOSIN HYDROCHLORIDE 0.4 MG/1
1 CAPSULE ORAL DAILY
Qty: 10 CAPSULE | Refills: 0 | Status: SHIPPED | OUTPATIENT
Start: 2019-07-31 | End: 2019-11-04

## 2019-07-31 RX ADMIN — IOPAMIDOL 85 ML: 612 INJECTION, SOLUTION INTRAVENOUS at 19:45

## 2019-07-31 RX ADMIN — TAZOBACTAM SODIUM AND PIPERACILLIN SODIUM 3.38 G: 375; 3 INJECTION, SOLUTION INTRAVENOUS at 20:24

## 2019-07-31 RX ADMIN — ONDANSETRON 4 MG: 2 INJECTION INTRAMUSCULAR; INTRAVENOUS at 19:56

## 2019-07-31 RX ADMIN — HYDROMORPHONE HYDROCHLORIDE 1 MG: 1 INJECTION, SOLUTION INTRAMUSCULAR; INTRAVENOUS; SUBCUTANEOUS at 19:58

## 2019-07-31 RX ADMIN — SODIUM CHLORIDE 1000 ML: 9 INJECTION, SOLUTION INTRAVENOUS at 19:56

## 2019-10-04 ENCOUNTER — TRANSCRIBE ORDERS (OUTPATIENT)
Dept: ADMINISTRATIVE | Facility: HOSPITAL | Age: 64
End: 2019-10-04

## 2019-10-04 DIAGNOSIS — Z12.31 VISIT FOR SCREENING MAMMOGRAM: Primary | ICD-10-CM

## 2019-11-04 ENCOUNTER — OFFICE VISIT (OUTPATIENT)
Dept: INTERNAL MEDICINE | Facility: CLINIC | Age: 64
End: 2019-11-04

## 2019-11-04 VITALS
BODY MASS INDEX: 28.52 KG/M2 | WEIGHT: 171.2 LBS | DIASTOLIC BLOOD PRESSURE: 92 MMHG | HEART RATE: 85 BPM | HEIGHT: 65 IN | SYSTOLIC BLOOD PRESSURE: 142 MMHG | OXYGEN SATURATION: 99 %

## 2019-11-04 DIAGNOSIS — Z00.00 HEALTHCARE MAINTENANCE: Primary | ICD-10-CM

## 2019-11-04 DIAGNOSIS — F41.9 ANXIETY: ICD-10-CM

## 2019-11-04 DIAGNOSIS — R82.998 LEUKOCYTES IN URINE: ICD-10-CM

## 2019-11-04 DIAGNOSIS — Z78.0 POSTMENOPAUSAL: ICD-10-CM

## 2019-11-04 LAB
ALBUMIN SERPL-MCNC: 4.1 G/DL (ref 3.5–5.2)
ALBUMIN/GLOB SERPL: 1.2 G/DL
ALP SERPL-CCNC: 114 U/L (ref 39–117)
ALT SERPL W P-5'-P-CCNC: 19 U/L (ref 1–33)
ANION GAP SERPL CALCULATED.3IONS-SCNC: 9.8 MMOL/L (ref 5–15)
AST SERPL-CCNC: 20 U/L (ref 1–32)
BASOPHILS # BLD AUTO: 0.03 10*3/MM3 (ref 0–0.2)
BASOPHILS NFR BLD AUTO: 0.6 % (ref 0–1.5)
BILIRUB BLD-MCNC: NEGATIVE MG/DL
BILIRUB SERPL-MCNC: 0.7 MG/DL (ref 0.2–1.2)
BUN BLD-MCNC: 14 MG/DL (ref 8–23)
BUN/CREAT SERPL: 18.2 (ref 7–25)
CALCIUM SPEC-SCNC: 9.1 MG/DL (ref 8.6–10.5)
CHLORIDE SERPL-SCNC: 104 MMOL/L (ref 98–107)
CHOLEST SERPL-MCNC: 178 MG/DL (ref 0–200)
CLARITY, POC: CLEAR
CO2 SERPL-SCNC: 28.2 MMOL/L (ref 22–29)
COLOR UR: ABNORMAL
CREAT BLD-MCNC: 0.77 MG/DL (ref 0.57–1)
DEPRECATED RDW RBC AUTO: 42.8 FL (ref 37–54)
EOSINOPHIL # BLD AUTO: 0.16 10*3/MM3 (ref 0–0.4)
EOSINOPHIL NFR BLD AUTO: 3 % (ref 0.3–6.2)
ERYTHROCYTE [DISTWIDTH] IN BLOOD BY AUTOMATED COUNT: 12.7 % (ref 12.3–15.4)
GFR SERPL CREATININE-BSD FRML MDRD: 75 ML/MIN/1.73
GLOBULIN UR ELPH-MCNC: 3.4 GM/DL
GLUCOSE BLD-MCNC: 85 MG/DL (ref 65–99)
GLUCOSE UR STRIP-MCNC: NEGATIVE MG/DL
HCT VFR BLD AUTO: 40.8 % (ref 34–46.6)
HDLC SERPL-MCNC: 72 MG/DL (ref 40–60)
HGB BLD-MCNC: 13.4 G/DL (ref 12–15.9)
IMM GRANULOCYTES # BLD AUTO: 0.02 10*3/MM3 (ref 0–0.05)
IMM GRANULOCYTES NFR BLD AUTO: 0.4 % (ref 0–0.5)
KETONES UR QL: ABNORMAL
LDLC SERPL CALC-MCNC: 93 MG/DL (ref 0–100)
LDLC/HDLC SERPL: 1.3 {RATIO}
LEUKOCYTE EST, POC: ABNORMAL
LYMPHOCYTES # BLD AUTO: 1.74 10*3/MM3 (ref 0.7–3.1)
LYMPHOCYTES NFR BLD AUTO: 32.2 % (ref 19.6–45.3)
MCH RBC QN AUTO: 30 PG (ref 26.6–33)
MCHC RBC AUTO-ENTMCNC: 32.8 G/DL (ref 31.5–35.7)
MCV RBC AUTO: 91.5 FL (ref 79–97)
MONOCYTES # BLD AUTO: 0.49 10*3/MM3 (ref 0.1–0.9)
MONOCYTES NFR BLD AUTO: 9.1 % (ref 5–12)
NEUTROPHILS # BLD AUTO: 2.96 10*3/MM3 (ref 1.7–7)
NEUTROPHILS NFR BLD AUTO: 54.7 % (ref 42.7–76)
NITRITE UR-MCNC: NEGATIVE MG/ML
NRBC BLD AUTO-RTO: 0 /100 WBC (ref 0–0.2)
PH UR: 5 [PH] (ref 5–8)
PLATELET # BLD AUTO: 291 10*3/MM3 (ref 140–450)
PMV BLD AUTO: 9.7 FL (ref 6–12)
POTASSIUM BLD-SCNC: 4.2 MMOL/L (ref 3.5–5.2)
PROT SERPL-MCNC: 7.5 G/DL (ref 6–8.5)
PROT UR STRIP-MCNC: NEGATIVE MG/DL
RBC # BLD AUTO: 4.46 10*6/MM3 (ref 3.77–5.28)
RBC # UR STRIP: NEGATIVE /UL
SODIUM BLD-SCNC: 142 MMOL/L (ref 136–145)
SP GR UR: 1.02 (ref 1–1.03)
TRIGL SERPL-MCNC: 63 MG/DL (ref 0–150)
TSH SERPL DL<=0.05 MIU/L-ACNC: 3.94 UIU/ML (ref 0.27–4.2)
UROBILINOGEN UR QL: ABNORMAL
VLDLC SERPL-MCNC: 12.6 MG/DL (ref 5–40)
WBC NRBC COR # BLD: 5.4 10*3/MM3 (ref 3.4–10.8)

## 2019-11-04 PROCEDURE — 84443 ASSAY THYROID STIM HORMONE: CPT | Performed by: INTERNAL MEDICINE

## 2019-11-04 PROCEDURE — 99396 PREV VISIT EST AGE 40-64: CPT | Performed by: INTERNAL MEDICINE

## 2019-11-04 PROCEDURE — 80061 LIPID PANEL: CPT | Performed by: INTERNAL MEDICINE

## 2019-11-04 PROCEDURE — 80053 COMPREHEN METABOLIC PANEL: CPT | Performed by: INTERNAL MEDICINE

## 2019-11-04 PROCEDURE — 87086 URINE CULTURE/COLONY COUNT: CPT | Performed by: INTERNAL MEDICINE

## 2019-11-04 PROCEDURE — 85025 COMPLETE CBC W/AUTO DIFF WBC: CPT | Performed by: INTERNAL MEDICINE

## 2019-11-04 PROCEDURE — 81003 URINALYSIS AUTO W/O SCOPE: CPT | Performed by: INTERNAL MEDICINE

## 2019-11-04 RX ORDER — SERTRALINE HYDROCHLORIDE 100 MG/1
100 TABLET, FILM COATED ORAL DAILY
Qty: 30 TABLET | Refills: 5 | Status: SHIPPED | OUTPATIENT
Start: 2019-11-04 | End: 2020-07-29 | Stop reason: SDUPTHER

## 2019-11-04 NOTE — PROGRESS NOTES
Chief Complaint   Patient presents with   • Annual Exam         Reported Health  Good Yes  FairNo  PoorNo      Dental,Vision,Hearing  Regular dental visitsYes  Vision ProblemsYes  Hearing LossNo      Immunization Status:  Up To DateNo        Lifestyle  Healthy DietYes  Weight ConcernsNo  Regular ExerciseYes  Tobacco UseNo  Alcohol UseYes  Drug AbuseNo      Screening  Cancer ScreeningYes  Metabolic ScreeningYes  Risk ScreeningYes    Past Medical History:   Diagnosis Date   • Acid reflux    • Basal cell carcinoma of skin    • Herpes zoster    • Humeral fracture    • Migraine    • Nephrolithiasis    • Osteopenia    • Thoracic vertebral fracture (CMS/HCC)    • Vitamin D deficiency      Past Surgical History:   Procedure Laterality Date   • BREAST BIOPSY Left    • CHOLECYSTECTOMY     • CYSTOSCOPY W/ LASER LITHOTRIPSY     • MOHS SURGERY     • ORIF HUMERAL SHAFT FRACTURE     • OR OPEN TREATMENT PROX HUMERAL FRACTURE Right 3/13/2017    Procedure: OPEN REDUCTION INTERNAL FIXATION RIGHT HUMERUS;  Surgeon: Ryan Jimenes MD;  Location: [facility];  Service: Orthopedics     Family History   Problem Relation Age of Onset   • Stroke Mother    • Hypertension Mother    • Osteoporosis Mother    • Kidney cancer Father    • Prostate cancer Father    • Diabetes type II Father    • Breast cancer Sister 59   • Osteoporosis Maternal Aunt    • Breast cancer Maternal Aunt    • Colon cancer Maternal Aunt      Social History     Socioeconomic History   • Marital status:      Spouse name: Not on file   • Number of children: Not on file   • Years of education: Not on file   • Highest education level: Not on file   Tobacco Use   • Smoking status: Never Smoker   • Smokeless tobacco: Never Used   Substance and Sexual Activity   • Alcohol use: Yes     Comment: occasionally   • Drug use: No   • Sexual activity: Defer       Review of Systems   Constitutional: Negative for activity change, appetite change, chills, diaphoresis, fatigue,  "fever and unexpected weight change.   HENT: Negative for congestion, ear discharge, ear pain, mouth sores, nosebleeds, sinus pressure, sneezing and sore throat.    Eyes: Negative for pain, discharge and itching.   Respiratory: Negative for cough, chest tightness, shortness of breath and wheezing.    Cardiovascular: Negative for chest pain, palpitations and leg swelling.   Gastrointestinal: Negative for abdominal pain, constipation, diarrhea, nausea and vomiting.   Endocrine: Negative for cold intolerance, heat intolerance, polydipsia and polyphagia.   Genitourinary: Negative for dysuria, flank pain, frequency, hematuria and urgency.   Musculoskeletal: Negative for arthralgias, back pain, gait problem, myalgias, neck pain and neck stiffness.   Skin: Negative for color change, pallor and rash.   Neurological: Negative for seizures, speech difficulty, numbness and headaches.   Psychiatric/Behavioral: Negative for agitation, confusion, decreased concentration and sleep disturbance. The patient is not nervous/anxious.      /92   Pulse 85   Ht 165.1 cm (65\")   Wt 77.7 kg (171 lb 3.2 oz)   SpO2 99%   BMI 28.49 kg/m²     Physical Exam   Constitutional: She is oriented to person, place, and time. She appears well-developed.   HENT:   Head: Normocephalic.   Right Ear: External ear normal.   Left Ear: External ear normal.   Nose: Nose normal.   Mouth/Throat: Oropharynx is clear and moist.   Eyes: Conjunctivae are normal. Pupils are equal, round, and reactive to light.   Neck: No JVD present. No thyromegaly present.   Cardiovascular: Normal rate, regular rhythm and normal heart sounds. Exam reveals no friction rub.   No murmur heard.  Pulmonary/Chest: Effort normal and breath sounds normal. No respiratory distress. She has no wheezes. She has no rales.   Abdominal: Soft. Bowel sounds are normal. She exhibits no distension. There is no tenderness. There is no guarding.   Musculoskeletal: She exhibits no edema or " tenderness.   Lymphadenopathy:     She has no cervical adenopathy.   Neurological: She is alert and oriented to person, place, and time. She displays normal reflexes. No cranial nerve deficit.   Skin: No rash noted.   Psychiatric: Her behavior is normal.   Nursing note and vitals reviewed.                Diet and Exercise    Healthy Diet Yes  Adequate DietYes  Poor DietNo  Adequate Exercise RegimenYes  Inadequate Exercise RegimenNo      Cervical Cancer Screening    Risks and benefits discussedYes  Screening currentYes  PAP Done Today OrderedNo  Screening Not IndicatedNo  Pap Every 3 yearsYes  Screening Done By GYNYes    Breast Cancer screening  Risks and Benefits DiscussedYes  Self Breast Exam taughtNo  Monthly Self Exam AdvisedYes  Screening CurrentYes  Mammogram OrderedNo  Screening Not IndicatedNo  Screening Managed By GYNYes  Patient DeclinesNo      STD Testing  ChlamydiaNo  GonorrheaNo  HIVNo      Osteoporosis Screening  Risks And Benefits DiscussedYes  BMD Currentyes  BMD orderedNo  Patient DeclinesNo    Colorectal Cancer Screening  Risks and Benefits DiscussedYes  Screening currentYes  FOBT Supplies givenNo  FOBT Every YearNo  Colonoscopy OrderedNo  Colonoscopy every 5 yearsYes  Colonoscopy every 10 yearsNo  Screening not indicatedNo  Patient declinesNo    Metabolic Screening  GlucoseYes  LipidsYes  CBCYes  TSHYes  UAYes  CMPYes  25OHNo      Immunizations  Risks and benefits discussedYes  Immunizations Up To DateNo  Immunizations NeededYes  Immunizations Per OrdersYes  Patient DNoeclines      Preventative Counseling  NutritionYes  Aerobic ExerciseYes  Weight Bearing ExerciseYes  Weight LossYes  Calcium SupplementsYes  Vitamin D SupplementsYes  Reproductive HealthNo  Cardiovascular Risk ReductionYes  Tobacco CessationNo  Alcohol UseYes  Sunscreen UseYes  Self Skin ExaminationYes  Helmet UseNo  Seat Belt UseYes  Fall Risk ReductionNo  Advanced Directive PlanningNo      Patient  Discussion  PatientYes  FamilyNo  BrettDonna Mendoza was seen today for annual exam.    Diagnoses and all orders for this visit:    Healthcare maintenance  -     POCT urinalysis dipstick, automated  -     CBC & Differential  -     Comprehensive Metabolic Panel  -     Lipid Panel  -     TSH  -     CBC Auto Differential    Anxiety  -     sertraline (ZOLOFT) 100 MG tablet; Take 1 tablet by mouth Daily.    Postmenopausal  -     DEXA Bone Density Axial; Future

## 2019-11-06 DIAGNOSIS — N39.0 URINARY TRACT INFECTION WITHOUT HEMATURIA, SITE UNSPECIFIED: Primary | ICD-10-CM

## 2019-11-06 LAB — BACTERIA SPEC AEROBE CULT: ABNORMAL

## 2019-11-06 RX ORDER — SULFAMETHOXAZOLE AND TRIMETHOPRIM 800; 160 MG/1; MG/1
1 TABLET ORAL 2 TIMES DAILY
Qty: 6 TABLET | Refills: 0 | Status: SHIPPED | OUTPATIENT
Start: 2019-11-06 | End: 2019-11-09

## 2019-11-08 ENCOUNTER — TELEPHONE (OUTPATIENT)
Dept: INTERNAL MEDICINE | Facility: CLINIC | Age: 64
End: 2019-11-08

## 2019-11-14 ENCOUNTER — TRANSCRIBE ORDERS (OUTPATIENT)
Dept: PHYSICAL THERAPY | Facility: CLINIC | Age: 64
End: 2019-11-14

## 2019-11-14 DIAGNOSIS — M65.4 DE QUERVAIN'S TENOSYNOVITIS: Primary | ICD-10-CM

## 2020-02-28 ENCOUNTER — APPOINTMENT (OUTPATIENT)
Dept: BONE DENSITY | Facility: HOSPITAL | Age: 65
End: 2020-02-28

## 2020-03-31 ENCOUNTER — TELEMEDICINE (OUTPATIENT)
Dept: INTERNAL MEDICINE | Facility: CLINIC | Age: 65
End: 2020-03-31

## 2020-03-31 DIAGNOSIS — J06.9 ACUTE URI: Primary | ICD-10-CM

## 2020-03-31 PROCEDURE — 99213 OFFICE O/P EST LOW 20 MIN: CPT | Performed by: INTERNAL MEDICINE

## 2020-03-31 RX ORDER — DOXYCYCLINE 100 MG/1
100 CAPSULE ORAL 2 TIMES DAILY
Qty: 20 CAPSULE | Refills: 0 | Status: SHIPPED | OUTPATIENT
Start: 2020-03-31 | End: 2020-06-12

## 2020-03-31 NOTE — PROGRESS NOTES
Subjective   Kelly Strickland is a 65 y.o. female.   Chief Complaint   Patient presents with   • Sore Throat   • Cough       History of Present Illness   On 3/3/2020 with to [facility] and dx with sinusitis and given Omnicef x 7 days with mucinex. Sxs did not improve so went back to [facility] on 3/13 and dx with URI and given zpak.  That week had Tmax 101. No fever since. Continues with cough and now has sore throat. No SOB. Continues with fatigue.   The following portions of the patient's history were reviewed and updated as appropriate: allergies, current medications, past family history, past medical history, past social history, past surgical history and problem list.    Review of Systems   Constitutional: Positive for fatigue. Negative for activity change, appetite change, chills, diaphoresis, fever, unexpected weight gain and unexpected weight loss.   HENT: Positive for rhinorrhea and sore throat. Negative for ear discharge, ear pain, mouth sores, nosebleeds, sinus pressure and sneezing.    Eyes: Negative for pain, discharge and itching.   Respiratory: Positive for cough. Negative for chest tightness, shortness of breath and wheezing.    Cardiovascular: Negative for chest pain, palpitations and leg swelling.   Gastrointestinal: Negative for abdominal pain, constipation, diarrhea, nausea and vomiting.   Endocrine: Negative for heat intolerance, polydipsia and polyphagia.   Genitourinary: Negative for dysuria, flank pain, frequency, hematuria and urgency.   Musculoskeletal: Negative for arthralgias, back pain, gait problem, myalgias, neck pain and neck stiffness.   Skin: Negative for color change, pallor and rash.   Neurological: Negative for seizures, speech difficulty and numbness.   Psychiatric/Behavioral: Negative for agitation, decreased concentration and sleep disturbance. The patient is not nervous/anxious.        Objective   Physical Exam   Constitutional: She is oriented to person, place, and time. She appears  well-developed.   HENT:   Head: Normocephalic.   Right Ear: External ear normal.   Left Ear: External ear normal.   Nose: Nose normal.   Mouth/Throat: Oropharynx is clear and moist.   Eyes: Right eye exhibits no discharge. Left eye exhibits no discharge. No scleral icterus.   Neurological: She is alert and oriented to person, place, and time.   Psychiatric: She has a normal mood and affect. Her behavior is normal. Thought content normal.         Assessment/Plan   Kelly was seen today for sore throat and cough.    Diagnoses and all orders for this visit:    Acute URI  -     doxycycline (MONODOX) 100 MG capsule; Take 1 capsule by mouth 2 (Two) Times a Day.    No covid testing done at [facility].  Currently afebrile with no SOA. If SOA develops and or fever and cough gets worst to [facility]/ED.  She will call Thursday and let us know how she is doing.

## 2020-06-12 ENCOUNTER — OFFICE VISIT (OUTPATIENT)
Dept: INTERNAL MEDICINE | Facility: CLINIC | Age: 65
End: 2020-06-12

## 2020-06-12 VITALS
HEIGHT: 66 IN | OXYGEN SATURATION: 98 % | TEMPERATURE: 98.6 F | WEIGHT: 161 LBS | BODY MASS INDEX: 25.88 KG/M2 | HEART RATE: 68 BPM | DIASTOLIC BLOOD PRESSURE: 84 MMHG | SYSTOLIC BLOOD PRESSURE: 130 MMHG | RESPIRATION RATE: 16 BRPM

## 2020-06-12 DIAGNOSIS — B02.9 HERPES ZOSTER WITHOUT COMPLICATION: Primary | ICD-10-CM

## 2020-06-12 PROCEDURE — 99213 OFFICE O/P EST LOW 20 MIN: CPT | Performed by: NURSE PRACTITIONER

## 2020-06-12 RX ORDER — ACYCLOVIR 800 MG/1
800 TABLET ORAL
Qty: 35 TABLET | Refills: 0 | Status: SHIPPED | OUTPATIENT
Start: 2020-06-12 | End: 2020-06-19

## 2020-06-12 NOTE — PROGRESS NOTES
Kelly Strickland is a 65 y.o. female who presents for rash.    Chief Complaint   Patient presents with   • Herpes Zoster       Herpes Zoster   This is a recurrent problem. The current episode started today. The problem has been unchanged. Associated symptoms include a rash (left side anterior, abdomen, breast, and neck ). Pertinent negatives include no abdominal pain, chest pain, chills, coughing, fever or nausea. Nothing aggravates the symptoms. She has tried nothing for the symptoms.         Past Medical History:   Diagnosis Date   • Acid reflux    • Anxiety    • Basal cell carcinoma of skin    • Diverticulosis    • Herpes zoster    • Humeral fracture    • Migraine    • Nephrolithiasis    • Osteopenia    • Thoracic vertebral fracture (CMS/HCC)    • Vitamin D deficiency        Past Surgical History:   Procedure Laterality Date   • BREAST BIOPSY Left    • CHOLECYSTECTOMY     • COLONOSCOPY     • CYSTOSCOPY W/ LASER LITHOTRIPSY     • HERNIA REPAIR     • MOHS SURGERY     • ORIF HUMERAL SHAFT FRACTURE     • OH OPEN TREATMENT PROX HUMERAL FRACTURE Right 3/13/2017    Procedure: OPEN REDUCTION INTERNAL FIXATION RIGHT HUMERUS;  Surgeon: Ryan Jimenes MD;  Location: [facility], 1928–1983;  Service: Orthopedics   • TONSILLECTOMY         Family History   Problem Relation Age of Onset   • Stroke Mother    • Hypertension Mother    • Osteoporosis Mother    • Kidney cancer Father    • Prostate cancer Father    • Diabetes type II Father    • Breast cancer Sister 59   • Cancer Sister         Breast   • Osteoporosis Maternal Aunt    • Breast cancer Maternal Aunt    • Colon cancer Maternal Aunt        Social History     Socioeconomic History   • Marital status:      Spouse name: Not on file   • Number of children: Not on file   • Years of education: Not on file   • Highest education level: Not on file   Tobacco Use   • Smoking status: Never Smoker   • Smokeless tobacco: Never Used   Substance and Sexual Activity   • Alcohol use:  "Yes     Alcohol/week: 6.0 standard drinks     Types: 6 Glasses of wine per week     Comment: occasionally   • Drug use: No   • Sexual activity: Yes     Partners: Male     Birth control/protection: None       No Known Allergies    ROS    Review of Systems   Constitutional: Negative for chills and fever.   Eyes: Negative for blurred vision and visual disturbance.   Respiratory: Negative for cough.    Cardiovascular: Negative for chest pain, palpitations and leg swelling.   Gastrointestinal: Negative for abdominal pain and nausea.   Skin: Positive for rash (left side anterior, abdomen, breast, and neck ).   Allergic/Immunologic: Negative for immunocompromised state.   Neurological: Negative for headache.   Hematological: Negative for adenopathy.       Vitals:    06/12/20 1241   BP: 130/84   Pulse: 68   Resp: 16   Temp: 98.6 °F (37 °C)   SpO2: 98%   Weight: 73 kg (161 lb)   Height: 167.6 cm (66\")   PainSc:   2         Current Outpatient Medications:   •  sertraline (ZOLOFT) 100 MG tablet, Take 1 tablet by mouth Daily., Disp: 30 tablet, Rfl: 5  •  acyclovir (ZOVIRAX) 800 MG tablet, Take 1 tablet by mouth 5 (Five) Times a Day for 7 days., Disp: 35 tablet, Rfl: 0  No current facility-administered medications for this visit.     Facility-Administered Medications Ordered in Other Visits:   •  bupivacaine PF (MARCAINE) 0.25 % injection, , , PRN, Naveen Ruvalcaba CRNA, 15 mL at 03/14/17 1200  •  buprenorphine (BUPRENEX) injection, , , PRN, Naveen Ruvalcaba CRNA, 0.3 mg at 03/14/17 1200  •  dexamethasone sodium phosphate injection, , Peripheral Nerve, PRN, Naveen Ruvalcaba CRNA, 2 mg at 03/14/17 1200    PE    Physical Exam   Constitutional: She is oriented to person, place, and time. She appears well-developed and well-nourished. No distress.   HENT:   Head: Normocephalic and atraumatic.   Eyes: Pupils are equal, round, and reactive to light. Conjunctivae and EOM are normal.   Neck: Normal range of motion. Neck supple. "   Cardiovascular: Normal rate, regular rhythm and normal heart sounds. Exam reveals no gallop and no friction rub.   No murmur heard.  Pulmonary/Chest: Effort normal and breath sounds normal.   Musculoskeletal: Normal range of motion.   Lymphadenopathy:     She has no cervical adenopathy.   Neurological: She is alert and oriented to person, place, and time.   Skin: Skin is warm. Capillary refill takes less than 2 seconds. Rash noted. Rash is vesicular and urticarial. She is not diaphoretic. There is erythema.        Psychiatric: She has a normal mood and affect. Her behavior is normal. Judgment and thought content normal.   Nursing note and vitals reviewed.       A/P    Problem List Items Addressed This Visit        Other    Herpes zoster - Primary    Overview     Impression: 11/24/2015 - famvir 500 mg po tid x 7 days;          Current Assessment & Plan     Take medications as prescribed below.  Follow-up as needed         Relevant Medications    acyclovir (ZOVIRAX) 800 MG tablet          Assessment      ICD-10-CM ICD-9-CM   1. Herpes zoster without complication B02.9 053.9            Problems Addressed this Visit        Other    Herpes zoster - Primary     Take medications as prescribed below.  Follow-up as needed         Relevant Medications    acyclovir (ZOVIRAX) 800 MG tablet           Plan    No orders of the defined types were placed in this encounter.    New Medications Ordered This Visit   Medications   • acyclovir (ZOVIRAX) 800 MG tablet     Sig: Take 1 tablet by mouth 5 (Five) Times a Day for 7 days.     Dispense:  35 tablet     Refill:  0                 Plan of care reviewed with patient at the conclusion of today's visit. Education was provided regarding diagnosis, management and any prescribed or recommended OTC medications.  Patient verbalizes understanding of and agreement with management plan.    Return if symptoms worsen or fail to improve.     WILFRID Nguyễn

## 2020-06-12 NOTE — PATIENT INSTRUCTIONS
Shingles    Shingles is an infection. It gives you a painful skin rash and blisters that have fluid in them. Shingles is caused by the same germ (virus) that causes chickenpox.  Shingles only happens in people who:  · Have had chickenpox.  · Have been given a shot of medicine (vaccine) to protect against chickenpox. Shingles is rare in this group.  The first symptoms of shingles may be itching, tingling, or pain in an area on your skin. A rash will show on your skin a few days or weeks later. The rash is likely to be on one side of your body. The rash usually has a shape like a belt or a band. Over time, the rash turns into fluid-filled blisters. The blisters will break open, change into scabs, and dry up. Medicines may:  · Help with pain and itching.  · Help you get better sooner.  · Help to prevent long-term problems.  Follow these instructions at home:  Medicines  · Take over-the-counter and prescription medicines only as told by your doctor.  · Put on an anti-itch cream or numbing cream where you have a rash, blisters, or scabs. Do this as told by your doctor.  Helping with itching and discomfort    · Put cold, wet cloths (cold compresses) on the area of the rash or blisters as told by your doctor.  · Cool baths can help you feel better. Try adding baking soda or dry oatmeal to the water to lessen itching. Do not bathe in hot water.  Blister and rash care  · Keep your rash covered with a loose bandage (dressing).  · Wear loose clothing that does not rub on your rash.  · Keep your rash and blisters clean. To do this, wash the area with mild soap and cool water as told by your doctor.  · Check your rash every day for signs of infection. Check for:  ? More redness, swelling, or pain.  ? Fluid or blood.  ? Warmth.  ? Pus or a bad smell.  · Do not scratch your rash. Do not pick at your blisters. To help you to not scratch:  ? Keep your fingernails clean and cut short.  ? Wear gloves or mittens when you sleep, if  scratching is a problem.  General instructions  · Rest as told by your doctor.  · Keep all follow-up visits as told by your doctor. This is important.  · Wash your hands often with soap and water. If soap and water are not available, use hand . Doing this lowers your chance of getting a skin infection caused by germs (bacteria).  · Your infection can cause chickenpox in people who have never had chickenpox or never got a shot of chickenpox vaccine. If you have blisters that did not change into scabs yet, try not to touch other people or be around other people, especially:  ? Babies.  ? Pregnant women.  ? Children who have areas of red, itchy, or rough skin (eczema).  ? Very old people who have transplants.  ? People who have a long-term (chronic) sickness, like cancer or AIDS.  Contact a doctor if:  · Your pain does not get better with medicine.  · Your pain does not get better after the rash heals.  · You have any signs of infection in the rash area. These signs include:  ? More redness, swelling, or pain around the rash.  ? Fluid or blood coming from the rash.  ? The rash area feeling warm to the touch.  ? Pus or a bad smell coming from the rash.  Get help right away if:  · The rash is on your face or nose.  · You have pain in your face or pain by your eye.  · You lose feeling on one side of your face.  · You have trouble seeing.  · You have ear pain, or you have ringing in your ear.  · You have a loss of taste.  · Your condition gets worse.  Summary  · Shingles gives you a painful skin rash and blisters that have fluid in them.  · Shingles is an infection. It is caused by the same germ (virus) that causes chickenpox.  · Keep your rash covered with a loose bandage (dressing). Wear loose clothing that does not rub on your rash.  · If you have blisters that did not change into scabs yet, try not to touch other people or be around people.  This information is not intended to replace advice given to you by  your health care provider. Make sure you discuss any questions you have with your health care provider.  Document Released: 06/05/2009 Document Revised: 04/10/2020 Document Reviewed: 08/22/2018  Elsevier Patient Education © 2020 [employer]

## 2020-06-19 ENCOUNTER — HOSPITAL ENCOUNTER (OUTPATIENT)
Dept: MAMMOGRAPHY | Facility: HOSPITAL | Age: 65
Discharge: HOME | End: 2020-06-19
Admitting: INTERNAL MEDICINE

## 2020-06-19 DIAGNOSIS — Z12.31 VISIT FOR SCREENING MAMMOGRAM: ICD-10-CM

## 2020-06-19 PROCEDURE — 77067 SCR MAMMO BI INCL CAD: CPT

## 2020-06-19 PROCEDURE — 77067 SCR MAMMO BI INCL CAD: CPT | Performed by: RADIOLOGY

## 2020-06-19 PROCEDURE — 77063 BREAST TOMOSYNTHESIS BI: CPT | Performed by: RADIOLOGY

## 2020-06-19 PROCEDURE — 77063 BREAST TOMOSYNTHESIS BI: CPT

## 2020-07-29 ENCOUNTER — OFFICE VISIT (OUTPATIENT)
Dept: INTERNAL MEDICINE | Facility: CLINIC | Age: 65
End: 2020-07-29

## 2020-07-29 VITALS
WEIGHT: 168 LBS | DIASTOLIC BLOOD PRESSURE: 92 MMHG | HEART RATE: 80 BPM | OXYGEN SATURATION: 97 % | HEIGHT: 66 IN | SYSTOLIC BLOOD PRESSURE: 140 MMHG | TEMPERATURE: 98 F | BODY MASS INDEX: 27 KG/M2

## 2020-07-29 DIAGNOSIS — F41.9 ANXIETY: ICD-10-CM

## 2020-07-29 PROCEDURE — 99213 OFFICE O/P EST LOW 20 MIN: CPT | Performed by: INTERNAL MEDICINE

## 2020-07-29 RX ORDER — SERTRALINE HYDROCHLORIDE 100 MG/1
100 TABLET, FILM COATED ORAL DAILY
Qty: 90 TABLET | Refills: 1 | Status: SHIPPED | OUTPATIENT
Start: 2020-07-29 | End: 2021-02-23 | Stop reason: SDUPTHER

## 2020-07-29 NOTE — PROGRESS NOTES
"Subjective   Kelly Strickland is a 65 y.o. female.   Chief Complaint   Patient presents with   • Med Refill       History of Present Illness   F/u on anxiety and needs Zoloft refilled. Doing well on the Zoloft. No side effects.   The following portions of the patient's history were reviewed and updated as appropriate: allergies, current medications, past family history, past medical history, past social history, past surgical history and problem list.    Review of Systems   Constitutional: Negative for activity change, appetite change, chills, diaphoresis, fatigue, fever and unexpected weight change.   HENT: Negative for congestion, ear discharge, ear pain, mouth sores, nosebleeds, sinus pressure, sneezing and sore throat.    Eyes: Negative for pain, discharge and itching.   Respiratory: Negative for cough, chest tightness, shortness of breath and wheezing.    Cardiovascular: Negative for chest pain, palpitations and leg swelling.   Gastrointestinal: Negative for abdominal pain, constipation, diarrhea, nausea and vomiting.   Endocrine: Negative for cold intolerance, heat intolerance, polydipsia and polyphagia.   Genitourinary: Negative for dysuria, flank pain, frequency, hematuria and urgency.   Musculoskeletal: Negative for arthralgias, back pain, gait problem, myalgias, neck pain and neck stiffness.   Skin: Negative for color change, pallor and rash.   Neurological: Negative for seizures, speech difficulty, numbness and headaches.   Psychiatric/Behavioral: Negative for agitation, confusion, decreased concentration and sleep disturbance. The patient is nervous/anxious.    /92   Pulse 80   Temp 98 °F (36.7 °C)   Ht 167.6 cm (66\")   Wt 76.2 kg (168 lb)   SpO2 97%   BMI 27.12 kg/m²       Objective   Physical Exam   Constitutional: She appears well-developed.   HENT:   Head: Normocephalic.   Right Ear: External ear normal.   Left Ear: External ear normal.   Nose: Nose normal.   Mouth/Throat: Oropharynx is " clear and moist.   Eyes: Pupils are equal, round, and reactive to light. Conjunctivae are normal.   Neck: No JVD present. No thyromegaly present.   Cardiovascular: Normal rate, regular rhythm and normal heart sounds. Exam reveals no friction rub.   No murmur heard.  Pulmonary/Chest: Effort normal and breath sounds normal. No respiratory distress. She has no wheezes. She has no rales.   Abdominal: Soft. Bowel sounds are normal. She exhibits no distension. There is no tenderness. There is no guarding.   Musculoskeletal: She exhibits no edema or tenderness.   Lymphadenopathy:     She has no cervical adenopathy.   Neurological: She displays normal reflexes. No cranial nerve deficit.   Skin: No rash noted.   Psychiatric: Her behavior is normal.   Nursing note and vitals reviewed.      Assessment/Plan   Kelly was seen today for med refill.    Diagnoses and all orders for this visit:    Anxiety  -     sertraline (ZOLOFT) 100 MG tablet; Take 1 tablet by mouth Daily.

## 2020-08-19 ENCOUNTER — HOSPITAL ENCOUNTER (OUTPATIENT)
Dept: BONE DENSITY | Facility: HOSPITAL | Age: 65
Discharge: HOME | End: 2020-08-19
Admitting: INTERNAL MEDICINE

## 2020-08-19 DIAGNOSIS — Z78.0 POSTMENOPAUSAL: ICD-10-CM

## 2020-08-19 PROCEDURE — 77080 DXA BONE DENSITY AXIAL: CPT

## 2020-08-20 ENCOUNTER — TELEPHONE (OUTPATIENT)
Dept: INTERNAL MEDICINE | Facility: CLINIC | Age: 65
End: 2020-08-20

## 2020-08-20 RX ORDER — ALENDRONATE SODIUM 70 MG/1
70 TABLET ORAL
Qty: 4 TABLET | Refills: 11 | Status: SHIPPED | OUTPATIENT
Start: 2020-08-20 | End: 2021-05-12

## 2020-08-20 NOTE — TELEPHONE ENCOUNTER
----- Message from Mary Antonio DO sent at 8/20/2020  8:05 AM EDT -----  Osteoporosis. Fosamax 70 mg once a week with 11 refills.  1,000 vitamin d 3 daily

## 2020-08-20 NOTE — TELEPHONE ENCOUNTER
Called and spoke with patient regarding labs.  Sent in RX for Fosomax and advised patient to get OTC Vitamin D3 and take daily.  Patient verbalized understanding.

## 2020-09-11 ENCOUNTER — TELEPHONE (OUTPATIENT)
Dept: INTERNAL MEDICINE | Facility: CLINIC | Age: 65
End: 2020-09-11

## 2020-09-12 NOTE — TELEPHONE ENCOUNTER
Left message for patient to return call to clinic.  
PATIENT STATES HER SON HAS HAD EXPOSURE AT WORK TO COVID-19 AND WAS TOLD TO SELF-QUARANTINE FOR 14 DAYS.  THE PATIENT REPORTS THAT THE SON LIVES WITH HER AND HER  AND BOTH OF THEM ARE AT RISK AND OLDER. THE PATIENT IS REQUESTING A CALL BACK TO DISCUSS WHAT ACTIONS SHE SHOULD TAKE AT THIS POINT.    PLEASE CALL PATIENT AND ADVISE [phone]   
Spoke with pt, no one in house has symptoms. Let know to self quarantine 14 days, if sxs do start to go to [facility] or ED for further evaluation. Pt verbalized understanding  
cigarettes/Quit 2013

## 2020-11-19 ENCOUNTER — TELEPHONE (OUTPATIENT)
Dept: INTERNAL MEDICINE | Facility: CLINIC | Age: 65
End: 2020-11-19

## 2020-11-19 NOTE — TELEPHONE ENCOUNTER
Caller: Kelly Strickland    Relationship: Self    Best call back number: [phone]    What medications are you currently taking:   Current Outpatient Medications on File Prior to Visit   Medication Sig Dispense Refill   • alendronate (Fosamax) 70 MG tablet Take 1 tablet by mouth Every 7 (Seven) Days. 4 tablet 11   • sertraline (ZOLOFT) 100 MG tablet Take 1 tablet by mouth Daily. 90 tablet 1     Current Facility-Administered Medications on File Prior to Visit   Medication Dose Route Frequency Provider Last Rate Last Admin   • bupivacaine PF (MARCAINE) 0.25 % injection    PRN Naveen Ruvalcaba CRNA   15 mL at 03/14/17 1200   • buprenorphine (BUPRENEX) injection    PRN Naveen Ruvalcaba CRNA   0.3 mg at 03/14/17 1200   • dexamethasone sodium phosphate injection   Peripheral Nerve PRN Naveen Ruvalcaba CRNA   2 mg at 03/14/17 1200        When did you start taking these medications:     Which medication are you concerned about: sertraline (ZOLOFT) 100 MG tablet    Who prescribed you this medication: ROCIO    What are your concerns: Patient is asking to increase the mediation to 150 MG    How long have you been taking these medications:     How long have you had these concerns:

## 2020-11-20 PROCEDURE — U0004 COV-19 TEST NON-CDC HGH THRU: HCPCS | Performed by: PHYSICIAN ASSISTANT

## 2020-11-23 ENCOUNTER — OFFICE VISIT (OUTPATIENT)
Dept: INTERNAL MEDICINE | Facility: CLINIC | Age: 65
End: 2020-11-23

## 2020-11-23 DIAGNOSIS — F32.A ANXIETY AND DEPRESSION: Primary | ICD-10-CM

## 2020-11-23 DIAGNOSIS — F41.9 ANXIETY AND DEPRESSION: Primary | ICD-10-CM

## 2020-11-23 PROCEDURE — 99213 OFFICE O/P EST LOW 20 MIN: CPT | Performed by: INTERNAL MEDICINE

## 2020-11-23 NOTE — PROGRESS NOTES
Subjective   Kelly Strickland is a 65 y.o. female.   Chief Complaint   Patient presents with   • Anxiety     would like to increase dose of sertraline     Agrees to telephone visit.   History of Present Illness   Anxiety and depression. On zoloft and would like to increase dose. More anxiety with Covid.   The following portions of the patient's history were reviewed and updated as appropriate: allergies, current medications, past family history, past medical history, past social history, past surgical history and problem list.    Review of Systems   Constitutional: Negative for activity change, appetite change, chills, diaphoresis, fatigue, fever and unexpected weight change.   HENT: Negative for congestion, ear discharge, ear pain, mouth sores, nosebleeds, sinus pressure, sneezing and sore throat.    Eyes: Negative for pain, discharge and itching.   Respiratory: Negative for cough, chest tightness, shortness of breath and wheezing.    Cardiovascular: Negative for chest pain, palpitations and leg swelling.   Gastrointestinal: Negative for abdominal pain, constipation, diarrhea, nausea and vomiting.   Endocrine: Negative for cold intolerance, heat intolerance, polydipsia and polyphagia.   Genitourinary: Negative for dysuria, flank pain, frequency, hematuria and urgency.   Musculoskeletal: Negative for arthralgias, back pain, gait problem, myalgias, neck pain and neck stiffness.   Skin: Negative for color change, pallor and rash.   Neurological: Negative for seizures, speech difficulty, numbness and headaches.   Psychiatric/Behavioral: Negative for agitation, confusion, decreased concentration and sleep disturbance. The patient is not nervous/anxious.        Objective   Physical Exam  Vitals signs and nursing note reviewed.   Constitutional:       Appearance: Normal appearance. She is well-developed.   Neck:      Thyroid: No thyromegaly.      Vascular: No JVD.   Neurological:      General: No focal deficit present.       Mental Status: She is alert and oriented to person, place, and time.   Psychiatric:         Mood and Affect: Mood normal.         Behavior: Behavior normal.         Assessment/Plan   Diagnoses and all orders for this visit:    1. Anxiety and depression (Primary)  -     sertraline (Zoloft) 50 MG tablet; Take 1 tablet by mouth Daily.  Dispense: 90 tablet; Refill: 1      50 mg was added to her 100mg of zoloft daily  This visit has been rescheduled as a phone visit to comply with patient safety concerns in accordance with CDC recommendations. Total time of discussion was 16 minutes.

## 2021-02-23 DIAGNOSIS — F41.9 ANXIETY: ICD-10-CM

## 2021-02-23 DIAGNOSIS — F41.9 ANXIETY AND DEPRESSION: ICD-10-CM

## 2021-02-23 DIAGNOSIS — F32.A ANXIETY AND DEPRESSION: ICD-10-CM

## 2021-02-23 RX ORDER — SERTRALINE HYDROCHLORIDE 100 MG/1
100 TABLET, FILM COATED ORAL DAILY
Qty: 90 TABLET | Refills: 1 | Status: SHIPPED | OUTPATIENT
Start: 2021-02-23 | End: 2021-09-30

## 2021-02-23 NOTE — TELEPHONE ENCOUNTER
Caller: Kelly Strickland    Relationship: Self    Best call back number: [phone]    Medication needed:   Requested Prescriptions     Pending Prescriptions Disp Refills   • sertraline (ZOLOFT) 100 MG tablet 90 tablet 1     Sig: Take 1 tablet by mouth Daily.   • sertraline (Zoloft) 50 MG tablet 90 tablet 1     Sig: Take 1 tablet by mouth Daily.       When do you need the refill by: ASAP    What details did the patient provide when requesting the medication:     Does the patient have less than a 3 day supply:  [x] Yes  [] No    What is the patient's preferred pharmacy: [id number] - [place], [place] - [address] [place] [address] [phone] [facility] [phone]

## 2021-02-23 NOTE — TELEPHONE ENCOUNTER
Last Office Visit: 11/23/20  Next Office Visit: 04/12/21    Labs completed in past 6 months? no  Labs completed in past year? no    Last Refill Date: 11/23/20  Quantity:90  Refills:1    Pharmacy:

## 2021-04-06 ENCOUNTER — TELEPHONE (OUTPATIENT)
Dept: INTERNAL MEDICINE | Facility: CLINIC | Age: 66
End: 2021-04-06

## 2021-04-06 NOTE — TELEPHONE ENCOUNTER
Patient was scheduled for a welcome to medicare visit on 4/12 but patient only has medicare a. She is fully covered through her husbands insurance

## 2021-04-12 ENCOUNTER — LAB (OUTPATIENT)
Dept: LAB | Facility: HOSPITAL | Age: 66
End: 2021-04-12

## 2021-04-12 ENCOUNTER — OFFICE VISIT (OUTPATIENT)
Dept: INTERNAL MEDICINE | Facility: CLINIC | Age: 66
End: 2021-04-12

## 2021-04-12 VITALS
BODY MASS INDEX: 29.66 KG/M2 | OXYGEN SATURATION: 98 % | DIASTOLIC BLOOD PRESSURE: 78 MMHG | WEIGHT: 178 LBS | HEIGHT: 65 IN | TEMPERATURE: 97.5 F | SYSTOLIC BLOOD PRESSURE: 122 MMHG | HEART RATE: 77 BPM

## 2021-04-12 DIAGNOSIS — Z00.00 HEALTHCARE MAINTENANCE: ICD-10-CM

## 2021-04-12 DIAGNOSIS — Z00.00 HEALTHCARE MAINTENANCE: Primary | ICD-10-CM

## 2021-04-12 DIAGNOSIS — F41.9 ANXIETY: ICD-10-CM

## 2021-04-12 LAB
ALBUMIN SERPL-MCNC: 4.5 G/DL (ref 3.5–5.2)
ALBUMIN/GLOB SERPL: 2 G/DL
ALP SERPL-CCNC: 106 U/L (ref 39–117)
ALT SERPL W P-5'-P-CCNC: 24 U/L (ref 1–33)
ANION GAP SERPL CALCULATED.3IONS-SCNC: 7.5 MMOL/L (ref 5–15)
AST SERPL-CCNC: 19 U/L (ref 1–32)
BASOPHILS # BLD AUTO: 0.02 10*3/MM3 (ref 0–0.2)
BASOPHILS NFR BLD AUTO: 0.4 % (ref 0–1.5)
BILIRUB SERPL-MCNC: 0.7 MG/DL (ref 0–1.2)
BUN SERPL-MCNC: 15 MG/DL (ref 8–23)
BUN/CREAT SERPL: 19.7 (ref 7–25)
CALCIUM SPEC-SCNC: 9 MG/DL (ref 8.6–10.5)
CHLORIDE SERPL-SCNC: 108 MMOL/L (ref 98–107)
CHOLEST SERPL-MCNC: 182 MG/DL (ref 0–200)
CO2 SERPL-SCNC: 27.5 MMOL/L (ref 22–29)
CREAT SERPL-MCNC: 0.76 MG/DL (ref 0.57–1)
DEPRECATED RDW RBC AUTO: 41.3 FL (ref 37–54)
EOSINOPHIL # BLD AUTO: 0.26 10*3/MM3 (ref 0–0.4)
EOSINOPHIL NFR BLD AUTO: 4.7 % (ref 0.3–6.2)
ERYTHROCYTE [DISTWIDTH] IN BLOOD BY AUTOMATED COUNT: 12.4 % (ref 12.3–15.4)
GFR SERPL CREATININE-BSD FRML MDRD: 76 ML/MIN/1.73
GLOBULIN UR ELPH-MCNC: 2.3 GM/DL
GLUCOSE SERPL-MCNC: 84 MG/DL (ref 65–99)
HCT VFR BLD AUTO: 40.2 % (ref 34–46.6)
HDLC SERPL-MCNC: 72 MG/DL (ref 40–60)
HGB BLD-MCNC: 14 G/DL (ref 12–15.9)
IMM GRANULOCYTES # BLD AUTO: 0.01 10*3/MM3 (ref 0–0.05)
IMM GRANULOCYTES NFR BLD AUTO: 0.2 % (ref 0–0.5)
LDLC SERPL CALC-MCNC: 96 MG/DL (ref 0–100)
LDLC/HDLC SERPL: 1.32 {RATIO}
LYMPHOCYTES # BLD AUTO: 1.34 10*3/MM3 (ref 0.7–3.1)
LYMPHOCYTES NFR BLD AUTO: 24.1 % (ref 19.6–45.3)
MCH RBC QN AUTO: 32 PG (ref 26.6–33)
MCHC RBC AUTO-ENTMCNC: 34.8 G/DL (ref 31.5–35.7)
MCV RBC AUTO: 92 FL (ref 79–97)
MONOCYTES # BLD AUTO: 0.47 10*3/MM3 (ref 0.1–0.9)
MONOCYTES NFR BLD AUTO: 8.5 % (ref 5–12)
NEUTROPHILS NFR BLD AUTO: 3.46 10*3/MM3 (ref 1.7–7)
NEUTROPHILS NFR BLD AUTO: 62.1 % (ref 42.7–76)
NRBC BLD AUTO-RTO: 0 /100 WBC (ref 0–0.2)
PLATELET # BLD AUTO: 289 10*3/MM3 (ref 140–450)
PMV BLD AUTO: 10 FL (ref 6–12)
POTASSIUM SERPL-SCNC: 4.5 MMOL/L (ref 3.5–5.2)
PROT SERPL-MCNC: 6.8 G/DL (ref 6–8.5)
RBC # BLD AUTO: 4.37 10*6/MM3 (ref 3.77–5.28)
SODIUM SERPL-SCNC: 143 MMOL/L (ref 136–145)
TRIGL SERPL-MCNC: 75 MG/DL (ref 0–150)
TSH SERPL DL<=0.05 MIU/L-ACNC: 3.34 UIU/ML (ref 0.27–4.2)
VLDLC SERPL-MCNC: 14 MG/DL (ref 5–40)
WBC # BLD AUTO: 5.56 10*3/MM3 (ref 3.4–10.8)

## 2021-04-12 PROCEDURE — 80053 COMPREHEN METABOLIC PANEL: CPT

## 2021-04-12 PROCEDURE — 80061 LIPID PANEL: CPT

## 2021-04-12 PROCEDURE — 99397 PER PM REEVAL EST PAT 65+ YR: CPT | Performed by: INTERNAL MEDICINE

## 2021-04-12 PROCEDURE — 84443 ASSAY THYROID STIM HORMONE: CPT

## 2021-04-12 PROCEDURE — 85025 COMPLETE CBC W/AUTO DIFF WBC: CPT

## 2021-04-12 RX ORDER — BUPROPION HYDROCHLORIDE 150 MG/1
150 TABLET ORAL DAILY
Qty: 30 TABLET | Refills: 1 | Status: SHIPPED | OUTPATIENT
Start: 2021-04-12 | End: 2021-05-21 | Stop reason: SDUPTHER

## 2021-04-12 NOTE — PROGRESS NOTES
Chief Complaint   Patient presents with   • Annual Exam       Reported Health  Good Yes  FairNo  PoorNo      Dental,Vision,Hearing  Regular dental visitsYes  Vision ProblemsYes  Hearing LossNo      Immunization Status:  Up To DateNo        Lifestyle  Healthy DietYes  Weight ConcernsYes  Regular ExerciseNo  Tobacco UseNo  Alcohol UseNo  Drug Abuseno      Screening  Cancer ScreeningYes  Metabolic ScreeningYes  Risk ScreeningYes  Past Medical History:   Diagnosis Date   • Acid reflux    • Anxiety    • Basal cell carcinoma of skin    • Diverticulosis    • Herpes zoster    • Humeral fracture    • Migraine    • Nephrolithiasis    • Osteopenia    • Thoracic vertebral fracture (CMS/HCC)    • Vitamin D deficiency      Past Surgical History:   Procedure Laterality Date   • BREAST BIOPSY Left    • CHOLECYSTECTOMY     • COLONOSCOPY     • CYSTOSCOPY W/ LASER LITHOTRIPSY     • HERNIA REPAIR     • MOHS SURGERY     • ORIF HUMERAL SHAFT FRACTURE     • HI OPEN TREATMENT PROX HUMERAL FRACTURE Right 3/13/2017    Procedure: OPEN REDUCTION INTERNAL FIXATION RIGHT HUMERUS;  Surgeon: Ryan Jimenes MD;  Location: [facility];  Service: Orthopedics   • TONSILLECTOMY       .fma  Social History     Socioeconomic History   • Marital status:      Spouse name: Not on file   • Number of children: Not on file   • Years of education: Not on file   • Highest education level: Not on file   Tobacco Use   • Smoking status: Never Smoker   • Smokeless tobacco: Never Used   Substance and Sexual Activity   • Alcohol use: Yes     Alcohol/week: 6.0 standard drinks     Types: 6 Glasses of wine per week     Comment: occasionally   • Drug use: No   • Sexual activity: Yes     Partners: Male     Birth control/protection: None     Current Outpatient Medications on File Prior to Visit   Medication Sig Dispense Refill   • sertraline (ZOLOFT) 100 MG tablet Take 1 tablet by mouth Daily. 90 tablet 1   • alendronate (Fosamax) 70 MG tablet Take 1 tablet by  "mouth Every 7 (Seven) Days. 4 tablet 11   • [DISCONTINUED] sertraline (Zoloft) 50 MG tablet Take 1 tablet by mouth Daily. 90 tablet 1     Current Facility-Administered Medications on File Prior to Visit   Medication Dose Route Frequency Provider Last Rate Last Admin   • bupivacaine PF (MARCAINE) 0.25 % injection    PRN Naveen Ruvalcaba CRNA   15 mL at 03/14/17 1200   • buprenorphine (BUPRENEX) injection    PRN Naveen Ruvalcaba CRNA   0.3 mg at 03/14/17 1200   • dexamethasone sodium phosphate injection   Peripheral Nerve PRN Naveen Ruvalcaba CRNA   2 mg at 03/14/17 1200         Review of Systems  /78   Pulse 77   Temp 97.5 °F (36.4 °C)   Ht 165.1 cm (65\")   Wt 80.7 kg (178 lb)   SpO2 98%   BMI 29.62 kg/m²     Physical Exam  Vitals and nursing note reviewed.   Constitutional:       Appearance: Normal appearance. She is well-developed.   HENT:      Head: Normocephalic.      Right Ear: External ear normal.      Left Ear: External ear normal.      Nose: Nose normal.      Mouth/Throat:      Pharynx: No oropharyngeal exudate or posterior oropharyngeal erythema.   Eyes:      Conjunctiva/sclera: Conjunctivae normal.      Pupils: Pupils are equal, round, and reactive to light.   Neck:      Thyroid: No thyromegaly.      Vascular: No JVD.   Cardiovascular:      Rate and Rhythm: Normal rate and regular rhythm.      Heart sounds: Normal heart sounds. No murmur heard.   No friction rub.   Pulmonary:      Effort: Pulmonary effort is normal. No respiratory distress.      Breath sounds: Normal breath sounds. No wheezing or rales.   Abdominal:      General: There is no distension.      Palpations: Abdomen is soft.      Tenderness: There is no abdominal tenderness.   Genitourinary:     General: Normal vulva.      Vagina: No vaginal discharge.   Musculoskeletal:         General: No tenderness.   Lymphadenopathy:      Cervical: No cervical adenopathy.   Skin:     Findings: No rash.   Neurological:      General: No focal " deficit present.      Mental Status: She is alert and oriented to person, place, and time.      Cranial Nerves: No cranial nerve deficit.      Deep Tendon Reflexes: Reflexes normal.   Psychiatric:         Mood and Affect: Mood normal.         Behavior: Behavior normal.                   Diet and Exercise    Healthy Diet Yes  Adequate DietYes  Poor DietNo  Adequate Exercise RegimenYes  Inadequate Exercise RegimenNo      Cervical Cancer Screening    Risks and benefits discussedYes  Screening currentNo  PAP Done Today OrderedYes  Screening Not IndicatedNo  Pap Every 3 yearsYes  Screening Done By GYNYes    Breast Cancer screening  Risks and Benefits DiscussedYes  Self Breast Exam taughtNo  Monthly Self Exam AdvisedYes  Screening CurrentYes  Mammogram OrderedNo  Screening Not IndicatedNo  Screening Managed By GYNNo  Patient DeclinesNo      STD Testing  ChlamydiaNo  GonorrheaNo  HIVNo      Osteoporosis Screening  Risks And Benefits DiscussedYes  BMD CurrentYes  BMD orderedNo  Patient DeclinesNo    Colorectal Cancer Screening  Risks and Benefits DiscussedYes  Screening currentYes  FOBT Supplies givenNo  FOBT Every YearNo  Colonoscopy OrderedNo  Colonoscopy every 5 yearsYes  Colonoscopy every 10 yearsNo  Screening not indicatedNo  Patient declinesNo    Metabolic Screening  GlucoseYes  LipidsYes  CBCYes  TSHYes  UAYes  CMPYes  25OHNo      Immunizations  Risks and benefits discussedYes  Immunizations Up To Dateno  Immunizations Neededyes  Immunizations Per OrdersNo  Patient DNoeclines      Preventative Counseling  NutritionYes  Aerobic ExerciseYes  Weight Bearing ExerciseYes  Weight LossYes  Calcium SupplementsYes  Vitamin D SupplementsYes  Reproductive HealthNo  Cardiovascular Risk ReductionNo  Tobacco CessationNo  Alcohol UseYes  Sunscreen UseYes  Self Skin ExaminationYes  Helmet UseYes  Seat Belt UseYes  Fall Risk ReductionNo  Advanced Directive PlanningNo      Patient  Discussion  PatientYes  FamilyNo  CounselingYes  Diagnoses and all orders for this visit:    1. Healthcare maintenance (Primary)  -     CBC & Differential; Future  -     Comprehensive Metabolic Panel; Future  -     Lipid Panel; Future  -     TSH; Future

## 2021-04-16 ENCOUNTER — PATIENT MESSAGE (OUTPATIENT)
Dept: INTERNAL MEDICINE | Facility: CLINIC | Age: 66
End: 2021-04-16

## 2021-05-12 ENCOUNTER — HOSPITAL ENCOUNTER (OUTPATIENT)
Dept: GENERAL RADIOLOGY | Facility: HOSPITAL | Age: 66
Discharge: HOME | End: 2021-05-12
Admitting: INTERNAL MEDICINE

## 2021-05-12 ENCOUNTER — OFFICE VISIT (OUTPATIENT)
Dept: INTERNAL MEDICINE | Facility: CLINIC | Age: 66
End: 2021-05-12

## 2021-05-12 VITALS
WEIGHT: 182 LBS | HEART RATE: 86 BPM | OXYGEN SATURATION: 98 % | TEMPERATURE: 97.5 F | HEIGHT: 65 IN | BODY MASS INDEX: 30.32 KG/M2 | DIASTOLIC BLOOD PRESSURE: 76 MMHG | SYSTOLIC BLOOD PRESSURE: 122 MMHG

## 2021-05-12 DIAGNOSIS — M54.50 ACUTE LOW BACK PAIN DUE TO TRAUMA: ICD-10-CM

## 2021-05-12 DIAGNOSIS — G89.11 ACUTE LOW BACK PAIN DUE TO TRAUMA: Primary | ICD-10-CM

## 2021-05-12 DIAGNOSIS — G89.11 ACUTE LOW BACK PAIN DUE TO TRAUMA: ICD-10-CM

## 2021-05-12 DIAGNOSIS — M54.50 ACUTE LOW BACK PAIN DUE TO TRAUMA: Primary | ICD-10-CM

## 2021-05-12 PROCEDURE — 99213 OFFICE O/P EST LOW 20 MIN: CPT | Performed by: INTERNAL MEDICINE

## 2021-05-12 PROCEDURE — 96372 THER/PROPH/DIAG INJ SC/IM: CPT | Performed by: INTERNAL MEDICINE

## 2021-05-12 PROCEDURE — 72100 X-RAY EXAM L-S SPINE 2/3 VWS: CPT

## 2021-05-12 RX ORDER — TIZANIDINE 4 MG/1
TABLET ORAL
Qty: 20 TABLET | Refills: 0 | OUTPATIENT
Start: 2021-05-12 | End: 2022-01-22

## 2021-05-12 RX ORDER — KETOROLAC TROMETHAMINE 30 MG/ML
60 INJECTION, SOLUTION INTRAMUSCULAR; INTRAVENOUS ONCE
Status: COMPLETED | OUTPATIENT
Start: 2021-05-12 | End: 2021-05-12

## 2021-05-12 RX ADMIN — KETOROLAC TROMETHAMINE 60 MG: 30 INJECTION, SOLUTION INTRAMUSCULAR; INTRAVENOUS at 13:07

## 2021-05-12 NOTE — PROGRESS NOTES
Subjective   Kelly Strickland is a 66 y.o. female.   Chief Complaint   Patient presents with   • Back Pain     fell Last Friday         History of Present Illness   Fell while on vacattion in [place]. Landed on back, right lower side. No immediate pain but after plane ride home on Sunday starting having pain. No numbness in legs. Ibuprofen does not help. If not moving no pain. If moves very painful.   The following portions of the patient's history were reviewed and updated as appropriate: allergies, current medications, past family history, past medical history, past social history, past surgical history and problem list.  Past Medical History:   Diagnosis Date   • Acid reflux    • Anxiety    • Basal cell carcinoma of skin    • Diverticulosis    • Herpes zoster    • Humeral fracture    • Migraine    • Nephrolithiasis    • Osteopenia    • Thoracic vertebral fracture (CMS/HCC)    • Vitamin D deficiency      Past Surgical History:   Procedure Laterality Date   • BREAST BIOPSY Left    • CHOLECYSTECTOMY     • COLONOSCOPY     • CYSTOSCOPY W/ LASER LITHOTRIPSY     • HERNIA REPAIR     • MOHS SURGERY     • ORIF HUMERAL SHAFT FRACTURE     • [place] OPEN TREATMENT PROX HUMERAL FRACTURE Right 3/13/2017    Procedure: OPEN REDUCTION INTERNAL FIXATION RIGHT HUMERUS;  Surgeon: Ryan Jimenes MD;  [facility]  Service: Orthopedics   • TONSILLECTOMY       Family History   Problem Relation Age of Onset   • Stroke Mother    • Hypertension Mother    • Osteoporosis Mother    • Kidney cancer Father    • Prostate cancer Father    • Diabetes type II Father    • Breast cancer Sister 59   • Cancer Sister         Breast   • Osteoporosis Maternal Aunt    • Breast cancer Maternal Aunt    • Colon cancer Maternal Aunt      Social History     Socioeconomic History   • Marital status:      Spouse name: Not on file   • Number of children: Not on file   • Years of education: Not on file   • Highest education level: Not on file  "  Tobacco Use   • Smoking status: Never Smoker   • Smokeless tobacco: Never Used   Substance and Sexual Activity   • Alcohol use: Yes     Alcohol/week: 6.0 standard drinks     Types: 6 Glasses of wine per week     Comment: occasionally   • Drug use: No   • Sexual activity: Yes     Partners: Male     Birth control/protection: None       Review of Systems   Constitutional: Negative for activity change, chills, diaphoresis and unexpected weight change.   HENT: Negative for congestion, sinus pressure, sneezing and sore throat.    Eyes: Negative for pain, discharge and itching.   Respiratory: Negative for cough, chest tightness, shortness of breath and wheezing.    Cardiovascular: Negative for chest pain, palpitations and leg swelling.   Gastrointestinal: Negative for abdominal pain, constipation, diarrhea, nausea and vomiting.   Genitourinary: Negative for flank pain and frequency.   Musculoskeletal: Positive for back pain. Negative for arthralgias, gait problem, myalgias, neck pain and neck stiffness.   Skin: Negative for color change, pallor and rash.   Neurological: Negative for seizures and headaches.   Psychiatric/Behavioral: Negative for agitation, confusion, decreased concentration and sleep disturbance. The patient is not nervous/anxious.      /76   Pulse 86   Temp 97.5 °F (36.4 °C)   Ht 165.1 cm (65\")   Wt 82.6 kg (182 lb)   SpO2 98%   BMI 30.29 kg/m²     Objective   Physical Exam  Vitals and nursing note reviewed.   Constitutional:       Appearance: Normal appearance. She is well-developed.   Neck:      Thyroid: No thyromegaly.      Vascular: No JVD.   Cardiovascular:      Rate and Rhythm: Normal rate and regular rhythm.      Heart sounds: Normal heart sounds. No murmur heard.   No friction rub.   Pulmonary:      Breath sounds: Normal breath sounds. No wheezing or rhonchi.   Musculoskeletal:         General: Tenderness (right lower back) present.   Skin:     Findings: No rash.   Neurological:      " Mental Status: She is alert and oriented to person, place, and time.      Cranial Nerves: No cranial nerve deficit.      Deep Tendon Reflexes: Reflexes normal.   Psychiatric:         Mood and Affect: Mood normal.         Behavior: Behavior normal.         Assessment/Plan   Diagnoses and all orders for this visit:    1. Acute low back pain due to trauma (Primary)  -     XR Spine Lumbar 2 or 3 View; Future  -     tiZANidine (ZANAFLEX) 4 MG tablet; One po qhs prn pain  Dispense: 20 tablet; Refill: 0  -     ketorolac (TORADOL) injection 60 mg  Prescribed Zanaflex 4 mg po qhs prn pain . Toradol 60 mg injection given in office today. Sent tfor xr lumbar

## 2021-05-21 DIAGNOSIS — F41.9 ANXIETY: ICD-10-CM

## 2021-05-21 RX ORDER — BUPROPION HYDROCHLORIDE 150 MG/1
150 TABLET ORAL DAILY
Qty: 90 TABLET | Refills: 1 | Status: SHIPPED | OUTPATIENT
Start: 2021-05-21 | End: 2022-01-05

## 2021-05-21 NOTE — TELEPHONE ENCOUNTER
Last Office Visit: 04/12/21  Next Office Visit: 04/18/22    Labs completed in past 6 months? yes  Labs completed in past year? yes    Last Refill Date:04/12/21  Quantity:30  Refills:1    Pharmacy:     Please review pended refill request for any changes needed on refills or quantities. Thank you!

## 2021-09-30 DIAGNOSIS — F41.9 ANXIETY: ICD-10-CM

## 2021-09-30 RX ORDER — SERTRALINE HYDROCHLORIDE 100 MG/1
TABLET, FILM COATED ORAL
Qty: 90 TABLET | Refills: 1 | Status: SHIPPED | OUTPATIENT
Start: 2021-09-30 | End: 2022-04-25 | Stop reason: SDUPTHER

## 2021-12-09 ENCOUNTER — HOSPITAL ENCOUNTER (OUTPATIENT)
Dept: GENERAL RADIOLOGY | Facility: HOSPITAL | Age: 66
Discharge: HOME | End: 2021-12-09
Admitting: PHYSICIAN ASSISTANT

## 2021-12-09 ENCOUNTER — OFFICE VISIT (OUTPATIENT)
Dept: INTERNAL MEDICINE | Facility: CLINIC | Age: 66
End: 2021-12-09

## 2021-12-09 VITALS
TEMPERATURE: 98.5 F | OXYGEN SATURATION: 98 % | HEART RATE: 82 BPM | DIASTOLIC BLOOD PRESSURE: 100 MMHG | BODY MASS INDEX: 29.35 KG/M2 | WEIGHT: 176.4 LBS | SYSTOLIC BLOOD PRESSURE: 142 MMHG

## 2021-12-09 DIAGNOSIS — J40 BRONCHITIS: Primary | ICD-10-CM

## 2021-12-09 DIAGNOSIS — J40 BRONCHITIS: ICD-10-CM

## 2021-12-09 PROCEDURE — 71046 X-RAY EXAM CHEST 2 VIEWS: CPT

## 2021-12-09 PROCEDURE — C9803 HOPD COVID-19 SPEC COLLECT: HCPCS

## 2021-12-09 PROCEDURE — 99214 OFFICE O/P EST MOD 30 MIN: CPT | Performed by: PHYSICIAN ASSISTANT

## 2021-12-09 PROCEDURE — U0004 COV-19 TEST NON-CDC HGH THRU: HCPCS | Performed by: PHYSICIAN ASSISTANT

## 2021-12-09 RX ORDER — METHYLPREDNISOLONE 4 MG/1
TABLET ORAL
Qty: 21 TABLET | Refills: 0 | OUTPATIENT
Start: 2021-12-09 | End: 2022-01-22

## 2021-12-09 RX ORDER — DEXTROMETHORPHAN HYDROBROMIDE AND PROMETHAZINE HYDROCHLORIDE 15; 6.25 MG/5ML; MG/5ML
5 SYRUP ORAL 4 TIMES DAILY PRN
Qty: 118 ML | Refills: 0 | OUTPATIENT
Start: 2021-12-09 | End: 2022-01-22

## 2021-12-09 RX ORDER — DOXYCYCLINE 100 MG/1
100 CAPSULE ORAL 2 TIMES DAILY
Qty: 20 CAPSULE | Refills: 0 | OUTPATIENT
Start: 2021-12-09 | End: 2022-01-22

## 2021-12-09 NOTE — PROGRESS NOTES
Chief Complaint   Patient presents with   • Cough     Acute    • Sore Throat       Subjective     Kelly Strickland is a 66 y.o. female.        History of Present Illness     Pt had a bad cold a couple weeks ago that settled into just a productive cough. Having purulent mucus. Woke up this morning with aching in her upper chest and neck. Worse when she takes a deep breath. Had a fever with initial infection but not recently. Never had a Covid test. No wheezing. Feels short of breath.    When she came over for the appointment, she got nauseated in the parking lot and vomited.        Current Outpatient Medications:   •  buPROPion XL (Wellbutrin XL) 150 MG 24 hr tablet, Take 1 tablet by mouth Daily., Disp: 90 tablet, Rfl: 1  •  sertraline (ZOLOFT) 100 MG tablet, TAKE 1 TABLET BY MOUTH EVERY DAY, Disp: 90 tablet, Rfl: 1  •  tiZANidine (ZANAFLEX) 4 MG tablet, One po qhs prn pain, Disp: 20 tablet, Rfl: 0  •  doxycycline (MONODOX) 100 MG capsule, Take 1 capsule by mouth 2 (Two) Times a Day., Disp: 20 capsule, Rfl: 0  •  methylPREDNISolone (Medrol) 4 MG dose pack, follow package directions, Disp: 21 tablet, Rfl: 0  •  promethazine-dextromethorphan (PROMETHAZINE-DM) 6.25-15 MG/5ML syrup, Take 5 mL by mouth 4 (Four) Times a Day As Needed for Cough., Disp: 118 mL, Rfl: 0  No current facility-administered medications for this visit.    Facility-Administered Medications Ordered in Other Visits:   •  bupivacaine PF (MARCAINE) 0.25 % injection, , , PRN, Naveen Ruvalcaba CRNA, 15 mL at 03/14/17 1200  •  buprenorphine (BUPRENEX) injection, , , PRN, Naveen Ruvalcaba CRNA, 0.3 mg at 03/14/17 1200  •  dexamethasone sodium phosphate injection, , Peripheral Nerve, PRN, Naveen Ruvalcaba CRNA, 2 mg at 03/14/17 1200     PMFSH  The following portions of the patient's history were reviewed and updated as appropriate: allergies, current medications, past family history, past medical history, past social history, past surgical history and  problem list.    Review of Systems   Constitutional: Positive for fatigue. Negative for activity change and unexpected weight change.   HENT: Positive for congestion, postnasal drip and sore throat. Negative for ear pain.    Eyes: Negative for pain and discharge.   Respiratory: Positive for cough. Negative for chest tightness, shortness of breath and wheezing.    Cardiovascular: Negative for chest pain and palpitations.   Gastrointestinal: Negative for abdominal pain, diarrhea and vomiting.   Endocrine: Negative.    Genitourinary: Negative.    Musculoskeletal: Negative for joint swelling.   Skin: Negative for color change, rash and wound.   Allergic/Immunologic: Negative.    Neurological: Negative for seizures and syncope.   Psychiatric/Behavioral: Negative.        Objective   /100   Pulse 82   Temp 98.5 °F (36.9 °C)   Wt 80 kg (176 lb 6.4 oz)   SpO2 98%   BMI 29.35 kg/m²     Physical Exam  Vitals and nursing note reviewed.   Constitutional:       General: She is not in acute distress.     Appearance: She is well-developed. She is not toxic-appearing or diaphoretic.   HENT:      Head: Normocephalic and atraumatic. Hair is normal.      Right Ear: External ear normal. No drainage, swelling or tenderness. Tympanic membrane is retracted.      Left Ear: External ear normal. No drainage, swelling or tenderness. Tympanic membrane is retracted.      Nose: Mucosal edema present.      Mouth/Throat:      Mouth: No oral lesions.      Pharynx: Uvula midline. Posterior oropharyngeal erythema present. No oropharyngeal exudate or uvula swelling.   Eyes:      General: No scleral icterus.        Right eye: No discharge.         Left eye: No discharge.      Conjunctiva/sclera: Conjunctivae normal.      Pupils: Pupils are equal, round, and reactive to light.   Cardiovascular:      Rate and Rhythm: Normal rate and regular rhythm.      Heart sounds: Normal heart sounds. No murmur heard.  No gallop.    Pulmonary:      Effort:  No respiratory distress.      Breath sounds: Normal breath sounds. No stridor. No wheezing or rales.   Chest:      Chest wall: No tenderness.   Abdominal:      Palpations: Abdomen is soft.      Tenderness: There is no abdominal tenderness.   Musculoskeletal:      Cervical back: Normal range of motion and neck supple.   Lymphadenopathy:      Cervical: Cervical adenopathy present.   Skin:     General: Skin is warm and dry.      Findings: No rash.   Neurological:      Mental Status: She is alert and oriented to person, place, and time.      Motor: No abnormal muscle tone.   Psychiatric:         Behavior: Behavior normal.         Thought Content: Thought content normal.         Judgment: Judgment normal.              ASSESSMENT/PLAN    Diagnoses and all orders for this visit:    1. Bronchitis (Primary)  Comments:  Check CXR. Start medrol dose pack and doxycycline as ordered. Use phenergan DM prn qhs for cough. Swab for Covid. Further recs based on results.  Orders:  -     methylPREDNISolone (Medrol) 4 MG dose pack; follow package directions  Dispense: 21 tablet; Refill: 0  -     doxycycline (MONODOX) 100 MG capsule; Take 1 capsule by mouth 2 (Two) Times a Day.  Dispense: 20 capsule; Refill: 0  -     XR Chest PA & Lateral; Future  -     promethazine-dextromethorphan (PROMETHAZINE-DM) 6.25-15 MG/5ML syrup; Take 5 mL by mouth 4 (Four) Times a Day As Needed for Cough.  Dispense: 118 mL; Refill: 0  -     COVID-19 PCR, [facility], NP SWAB IN [facility]SP[facility] MEDIA/ORAL SWISH 24-30 HR TAT - Swab, Nasopharynx; Future  -     COVID-19 PCR, [facility], NP SWAB IN [facility]IA/ORAL SWISH 24-30 HR TAT - Swab, Nasopharynx             Return if symptoms worsen or fail to improve, for Next scheduled follow up.

## 2021-12-10 LAB — SARS-COV-2 RNA NOSE QL NAA+PROBE: NOT DETECTED

## 2022-01-05 DIAGNOSIS — F41.9 ANXIETY: ICD-10-CM

## 2022-01-05 RX ORDER — BUPROPION HYDROCHLORIDE 150 MG/1
TABLET ORAL
Qty: 90 TABLET | Refills: 1 | Status: SHIPPED | OUTPATIENT
Start: 2022-01-05 | End: 2022-04-25 | Stop reason: SDUPTHER

## 2022-01-22 PROCEDURE — U0004 COV-19 TEST NON-CDC HGH THRU: HCPCS | Performed by: NURSE PRACTITIONER

## 2022-02-24 ENCOUNTER — TRANSCRIBE ORDERS (OUTPATIENT)
Dept: ADMINISTRATIVE | Facility: HOSPITAL | Age: 67
End: 2022-02-24

## 2022-02-24 DIAGNOSIS — Z12.31 VISIT FOR SCREENING MAMMOGRAM: Primary | ICD-10-CM

## 2022-03-17 ENCOUNTER — HOSPITAL ENCOUNTER (OUTPATIENT)
Dept: MAMMOGRAPHY | Facility: HOSPITAL | Age: 67
Discharge: HOME | End: 2022-03-17
Admitting: INTERNAL MEDICINE

## 2022-03-17 DIAGNOSIS — Z12.31 VISIT FOR SCREENING MAMMOGRAM: ICD-10-CM

## 2022-03-17 PROCEDURE — 77063 BREAST TOMOSYNTHESIS BI: CPT | Performed by: RADIOLOGY

## 2022-03-17 PROCEDURE — 77067 SCR MAMMO BI INCL CAD: CPT | Performed by: RADIOLOGY

## 2022-03-17 PROCEDURE — 77067 SCR MAMMO BI INCL CAD: CPT

## 2022-03-17 PROCEDURE — 77063 BREAST TOMOSYNTHESIS BI: CPT

## 2022-04-25 ENCOUNTER — OFFICE VISIT (OUTPATIENT)
Dept: INTERNAL MEDICINE | Facility: CLINIC | Age: 67
End: 2022-04-25

## 2022-04-25 ENCOUNTER — TELEPHONE (OUTPATIENT)
Dept: INTERNAL MEDICINE | Facility: CLINIC | Age: 67
End: 2022-04-25

## 2022-04-25 ENCOUNTER — LAB (OUTPATIENT)
Dept: LAB | Facility: HOSPITAL | Age: 67
End: 2022-04-25

## 2022-04-25 VITALS
HEART RATE: 104 BPM | WEIGHT: 176.2 LBS | HEIGHT: 65 IN | SYSTOLIC BLOOD PRESSURE: 118 MMHG | TEMPERATURE: 97.8 F | BODY MASS INDEX: 29.36 KG/M2 | DIASTOLIC BLOOD PRESSURE: 72 MMHG | OXYGEN SATURATION: 98 %

## 2022-04-25 DIAGNOSIS — F41.9 ANXIETY: ICD-10-CM

## 2022-04-25 DIAGNOSIS — M79.674 GREAT TOE PAIN, RIGHT: ICD-10-CM

## 2022-04-25 DIAGNOSIS — Z00.00 HEALTHCARE MAINTENANCE: Primary | ICD-10-CM

## 2022-04-25 DIAGNOSIS — M25.552 LEFT HIP PAIN: ICD-10-CM

## 2022-04-25 DIAGNOSIS — Z00.00 HEALTHCARE MAINTENANCE: ICD-10-CM

## 2022-04-25 LAB
BILIRUB BLD-MCNC: NEGATIVE MG/DL
CLARITY, POC: CLEAR
COLOR UR: YELLOW
EXPIRATION DATE: ABNORMAL
GLUCOSE UR STRIP-MCNC: NEGATIVE MG/DL
KETONES UR QL: ABNORMAL
LEUKOCYTE EST, POC: NEGATIVE
Lab: ABNORMAL
NITRITE UR-MCNC: NEGATIVE MG/ML
PH UR: 6 [PH] (ref 5–8)
PROT UR STRIP-MCNC: ABNORMAL MG/DL
RBC # UR STRIP: NEGATIVE /UL
SP GR UR: 1.03 (ref 1–1.03)
UROBILINOGEN UR QL: NORMAL

## 2022-04-25 PROCEDURE — 84550 ASSAY OF BLOOD/URIC ACID: CPT

## 2022-04-25 PROCEDURE — 80050 GENERAL HEALTH PANEL: CPT

## 2022-04-25 PROCEDURE — 36415 COLL VENOUS BLD VENIPUNCTURE: CPT

## 2022-04-25 PROCEDURE — 81003 URINALYSIS AUTO W/O SCOPE: CPT | Performed by: INTERNAL MEDICINE

## 2022-04-25 PROCEDURE — 80061 LIPID PANEL: CPT

## 2022-04-25 PROCEDURE — 99397 PER PM REEVAL EST PAT 65+ YR: CPT | Performed by: INTERNAL MEDICINE

## 2022-04-25 RX ORDER — SERTRALINE HYDROCHLORIDE 25 MG/1
25 TABLET, FILM COATED ORAL DAILY
Qty: 30 TABLET | Refills: 0 | Status: SHIPPED | OUTPATIENT
Start: 2022-04-25 | End: 2022-05-20 | Stop reason: SDUPTHER

## 2022-04-25 RX ORDER — SERTRALINE HYDROCHLORIDE 100 MG/1
100 TABLET, FILM COATED ORAL DAILY
Qty: 90 TABLET | Refills: 3 | Status: SHIPPED | OUTPATIENT
Start: 2022-04-25 | End: 2022-04-25

## 2022-04-25 RX ORDER — BUPROPION HYDROCHLORIDE 150 MG/1
150 TABLET ORAL DAILY
Qty: 90 TABLET | Refills: 3 | Status: SHIPPED | OUTPATIENT
Start: 2022-04-25

## 2022-04-25 NOTE — TELEPHONE ENCOUNTER
Caller: Kelly Strickland    Relationship: Self    Best call back number:     What is the best time to reach you: ANYTIME    Who are you requesting to speak with (clinical staff, provider,  specific staff member): CLINICAL STAFF    Do you know the name of the person who called:KELLY    What was the call regarding: PATIENT WAS ASKING IF SHE COULD COME ON IN AND DO HER LABS    Do you require a callback: YES

## 2022-04-25 NOTE — PROGRESS NOTES
Chief Complaint   Patient presents with   • Annual Exam   • Anxiety       Reported Health  Good Yes  FairNo  PoorNo      Dental,Vision,Hearing  Regular dental visitsYes  Vision ProblemsYes  Hearing LossNo      Immunization Status:  Up To DateNo        Lifestyle  Healthy DietYes  Weight ConcernsYes  Regular ExerciseYes  Tobacco UseNo  Alcohol UseYes  Drug AbuseNo      Screening  Cancer ScreeningYes  Metabolic ScreeningYes  Risk ScreeningYes  Past Medical History:   Diagnosis Date   • Acid reflux    • Anxiety    • Basal cell carcinoma of skin    • Diverticulosis    • Herpes zoster    • Humeral fracture    • Migraine    • Nephrolithiasis    • Osteopenia    • Thoracic vertebral fracture (HCC)    • Vitamin D deficiency      Past Surgical History:   Procedure Laterality Date   • BREAST BIOPSY Left    • CHOLECYSTECTOMY     • COLONOSCOPY     • CYSTOSCOPY W/ LASER LITHOTRIPSY     • HERNIA REPAIR     • MOHS SURGERY     • ORIF HUMERAL SHAFT FRACTURE     • [place] OPEN TREATMENT PROX HUMERAL FRACTURE Right 3/13/2017    Procedure: OPEN REDUCTION INTERNAL FIXATION RIGHT HUMERUS;  Surgeon: Ryan Jimenes MD;  Location: [facility];  Service: Orthopedics   • TONSILLECTOMY       Family History   Problem Relation Age of Onset   • Stroke Mother    • Hypertension Mother    • Osteoporosis Mother    • Kidney cancer Father    • Prostate cancer Father    • Diabetes type II Father    • Breast cancer Sister 59   • Cancer Sister         Breast   • Osteoporosis Maternal Aunt    • Breast cancer Maternal Aunt    • Colon cancer Maternal Aunt        Social History     Socioeconomic History   • Marital status:    Tobacco Use   • Smoking status: Never Smoker   • Smokeless tobacco: Never Used   Vaping Use   • Vaping Use: Never used   Substance and Sexual Activity   • Alcohol use: Yes     Alcohol/week: 6.0 standard drinks     Types: 6 Glasses of wine per week     Comment: occasionally   • Drug use: No   • Sexual activity: Yes     Partners:  "Male     Birth control/protection: None     Current Outpatient Medications on File Prior to Visit   Medication Sig Dispense Refill   • [DISCONTINUED] buPROPion XL (WELLBUTRIN XL) 150 MG 24 hr tablet TAKE 1 TABLET BY MOUTH EVERY DAY 90 tablet 1   • [DISCONTINUED] sertraline (ZOLOFT) 100 MG tablet TAKE 1 TABLET BY MOUTH EVERY DAY 90 tablet 1     Current Facility-Administered Medications on File Prior to Visit   Medication Dose Route Frequency Provider Last Rate Last Admin   • bupivacaine PF (MARCAINE) 0.25 % injection    PRN Naveen Ruvalcaba CRNA   15 mL at 03/14/17 1200   • buprenorphine (BUPRENEX) injection    PRN Naveen Ruvalcaba CRNA   0.3 mg at 03/14/17 1200   • dexamethasone sodium phosphate injection   Peripheral Nerve PRN Naveen Ruvalcaba CRNA   2 mg at 03/14/17 1200         Review of Systems   Constitutional: Negative for chills, diaphoresis and fatigue.   HENT: Negative for sinus pressure and sneezing.    Respiratory: Negative for cough and shortness of breath.    Cardiovascular: Negative for chest pain and leg swelling.   Gastrointestinal: Negative for constipation, diarrhea and nausea.   Musculoskeletal:        Hip pain  Toe pain   Neurological: Negative for syncope and headaches.   Psychiatric/Behavioral: Negative for confusion. The patient is not nervous/anxious.      /72   Pulse 104   Temp 97.8 °F (36.6 °C)   Ht 165.1 cm (65\")   Wt 79.9 kg (176 lb 3.2 oz)   SpO2 98%   BMI 29.32 kg/m²     Physical Exam  Vitals reviewed.   HENT:      Right Ear: Tympanic membrane and ear canal normal.      Left Ear: Tympanic membrane and ear canal normal.      Nose: No congestion.      Mouth/Throat:      Pharynx: No oropharyngeal exudate.   Eyes:      General: No scleral icterus.  Cardiovascular:      Rate and Rhythm: Normal rate and regular rhythm.      Heart sounds: No murmur heard.  Pulmonary:      Effort: No respiratory distress.      Breath sounds: No wheezing or rales.   Abdominal:      General: There " is no distension.      Tenderness: There is no abdominal tenderness. There is no guarding.   Musculoskeletal:      Right lower leg: No edema.      Left lower leg: No edema.   Neurological:      General: No focal deficit present.      Mental Status: She is alert and oriented to person, place, and time.      Cranial Nerves: No cranial nerve deficit.      Motor: No weakness.   Psychiatric:         Mood and Affect: Mood normal.         Behavior: Behavior normal.             Diet and Exercise    Healthy Diet Yes  Adequate DietYes  Poor DietNo  Adequate Exercise RegimenYes  Inadequate Exercise RegimenNo      Cervical Cancer Screening    Risks and benefits discussedYes  Screening currentYes  PAP Done Today OrderedNo  Screening Not IndicatedNo  Pap Every 3 yearsYes  Screening Done By GYNYes    Breast Cancer screening  Risks and Benefits DiscussedYes  Self Breast Exam taughtNo  Monthly Self Exam AdvisedYes  Screening CurrentYes  Mammogram OrderedNo  Screening Not IndicatedNo  Screening Managed By GYNNo  Patient DeclinesNo      STD Testing  ChlamydiaNo  GonorrheaNo  HIVNo      Osteoporosis Screening  Risks And Benefits DiscussedYes  BMD Currentyes  BMD orderedNo  Patient DeclinesNo    Colorectal Cancer Screening  Risks and Benefits DiscussedYes  Screening currentYes  FOBT Supplies givenNo  FOBT Every YearNo  Colonoscopy OrderedNo  Colonoscopy every 7 yearsYes  Colonoscopy every 10 yearsNo  Screening not indicatedNo  Patient declinesNo    Metabolic Screening  GlucoseYes  LipidsYes  CBCYes  TSHYes  UAYes  CMPYes  25OHNo      Immunizations  Risks and benefits discussedYes  Immunizations Up To DateNo  Immunizations NeededYes  Immunizations Per OrdersNo  Patient DNoeclines      Preventative Counseling  NutritionYes  Aerobic ExerciseYes  Weight Bearing ExerciseYes  Weight LossYes  Calcium SupplementsNo  Vitamin D SupplementsYes  Reproductive HealthNo  Cardiovascular Risk ReductionYes  Tobacco CessationNo  Alcohol UseYes  Sunscreen  UseYes  Self Skin ExaminationYes  Helmet UseNo  Seat Belt UseYes  Fall Risk ReductionNo  Advanced Directive PlanningNo      Patient Discussion  PatientYes  FamilyNo  CounselingYes  Diagnoses and all orders for this visit:    1. Healthcare maintenance (Primary)  -     POCT urinalysis dipstick, automated    2. Anxiety  -     buPROPion XL (WELLBUTRIN XL) 150 MG 24 hr tablet; Take 1 tablet by mouth Daily.  Dispense: 90 tablet; Refill: 3  -     Discontinue: sertraline (ZOLOFT) 100 MG tablet; Take 1 tablet by mouth Daily.  Dispense: 90 tablet; Refill: 3  She has only been taking 1/2 zoloft x 1 month and wants to go off. Decrease to 25 mg zoloft x 1month then stop.   3. Great toe pain, right  -     Uric acid; Future    4. Left hip pain  -     XR Hip With or Without Pelvis 2 - 3 View Left; Future

## 2022-04-25 NOTE — TELEPHONE ENCOUNTER
PATIENT IS CALLING TO STATE THAT SHE GOT HER LABS DONE EARLIER TODAY AND WANTS TO KNOW IF SHE NEEDS TO LEAVE ANOTHER URINE SPECIMEN.

## 2022-04-26 ENCOUNTER — TELEPHONE (OUTPATIENT)
Dept: INTERNAL MEDICINE | Facility: CLINIC | Age: 67
End: 2022-04-26

## 2022-04-26 ENCOUNTER — HOSPITAL ENCOUNTER (OUTPATIENT)
Dept: GENERAL RADIOLOGY | Facility: HOSPITAL | Age: 67
Discharge: HOME | End: 2022-04-26
Admitting: INTERNAL MEDICINE

## 2022-04-26 DIAGNOSIS — M25.552 LEFT HIP PAIN: ICD-10-CM

## 2022-04-26 DIAGNOSIS — N28.9 RENAL INSUFFICIENCY: Primary | ICD-10-CM

## 2022-04-26 LAB
ALBUMIN SERPL-MCNC: 5 G/DL (ref 3.5–5.2)
ALBUMIN/GLOB SERPL: 1.7 G/DL
ALP SERPL-CCNC: 123 U/L (ref 39–117)
ALT SERPL W P-5'-P-CCNC: 29 U/L (ref 1–33)
ANION GAP SERPL CALCULATED.3IONS-SCNC: 14.3 MMOL/L (ref 5–15)
AST SERPL-CCNC: 27 U/L (ref 1–32)
BASOPHILS # BLD AUTO: 0.02 10*3/MM3 (ref 0–0.2)
BASOPHILS NFR BLD AUTO: 0.3 % (ref 0–1.5)
BILIRUB SERPL-MCNC: 1 MG/DL (ref 0–1.2)
BUN SERPL-MCNC: 18 MG/DL (ref 8–23)
BUN/CREAT SERPL: 17.3 (ref 7–25)
CALCIUM SPEC-SCNC: 9.1 MG/DL (ref 8.6–10.5)
CHLORIDE SERPL-SCNC: 100 MMOL/L (ref 98–107)
CHOLEST SERPL-MCNC: 196 MG/DL (ref 0–200)
CO2 SERPL-SCNC: 21.7 MMOL/L (ref 22–29)
CREAT SERPL-MCNC: 1.04 MG/DL (ref 0.57–1)
DEPRECATED RDW RBC AUTO: 42.8 FL (ref 37–54)
EGFRCR SERPLBLD CKD-EPI 2021: 59 ML/MIN/1.73
EOSINOPHIL # BLD AUTO: 0.06 10*3/MM3 (ref 0–0.4)
EOSINOPHIL NFR BLD AUTO: 1 % (ref 0.3–6.2)
ERYTHROCYTE [DISTWIDTH] IN BLOOD BY AUTOMATED COUNT: 12.3 % (ref 12.3–15.4)
GLOBULIN UR ELPH-MCNC: 2.9 GM/DL
GLUCOSE SERPL-MCNC: 86 MG/DL (ref 65–99)
HCT VFR BLD AUTO: 45.3 % (ref 34–46.6)
HDLC SERPL-MCNC: 66 MG/DL (ref 40–60)
HGB BLD-MCNC: 15.1 G/DL (ref 12–15.9)
IMM GRANULOCYTES # BLD AUTO: 0.02 10*3/MM3 (ref 0–0.05)
IMM GRANULOCYTES NFR BLD AUTO: 0.3 % (ref 0–0.5)
LDLC SERPL CALC-MCNC: 114 MG/DL (ref 0–100)
LDLC/HDLC SERPL: 1.7 {RATIO}
LYMPHOCYTES # BLD AUTO: 1.22 10*3/MM3 (ref 0.7–3.1)
LYMPHOCYTES NFR BLD AUTO: 20.3 % (ref 19.6–45.3)
MCH RBC QN AUTO: 31.4 PG (ref 26.6–33)
MCHC RBC AUTO-ENTMCNC: 33.3 G/DL (ref 31.5–35.7)
MCV RBC AUTO: 94.2 FL (ref 79–97)
MONOCYTES # BLD AUTO: 0.56 10*3/MM3 (ref 0.1–0.9)
MONOCYTES NFR BLD AUTO: 9.3 % (ref 5–12)
NEUTROPHILS NFR BLD AUTO: 4.12 10*3/MM3 (ref 1.7–7)
NEUTROPHILS NFR BLD AUTO: 68.8 % (ref 42.7–76)
NRBC BLD AUTO-RTO: 0 /100 WBC (ref 0–0.2)
PLATELET # BLD AUTO: 254 10*3/MM3 (ref 140–450)
PMV BLD AUTO: 9.6 FL (ref 6–12)
POTASSIUM SERPL-SCNC: 4.9 MMOL/L (ref 3.5–5.2)
PROT SERPL-MCNC: 7.9 G/DL (ref 6–8.5)
RBC # BLD AUTO: 4.81 10*6/MM3 (ref 3.77–5.28)
SODIUM SERPL-SCNC: 136 MMOL/L (ref 136–145)
TRIGL SERPL-MCNC: 88 MG/DL (ref 0–150)
TSH SERPL DL<=0.05 MIU/L-ACNC: 2.92 UIU/ML (ref 0.27–4.2)
URATE SERPL-MCNC: 5.7 MG/DL (ref 2.4–5.7)
VLDLC SERPL-MCNC: 16 MG/DL (ref 5–40)
WBC NRBC COR # BLD: 6 10*3/MM3 (ref 3.4–10.8)

## 2022-04-26 PROCEDURE — 73502 X-RAY EXAM HIP UNI 2-3 VIEWS: CPT

## 2022-04-26 NOTE — TELEPHONE ENCOUNTER
Left message for patient to return call to office regarding lab/xray results.  Office Phone number given

## 2022-04-26 NOTE — TELEPHONE ENCOUNTER
Let pt know of results. Pt verbalized understanding. Pt would like to know what next step would be to figure out what is causing her pain

## 2022-04-26 NOTE — TELEPHONE ENCOUNTER
----- Message from Mary Antonio DO sent at 4/26/2022  3:24 PM EDT -----  Your kidney function is decreased.  Avoid any products that contain ibuprofen.  Needs to stop by in 2 weeks for repeat level.

## 2022-04-27 DIAGNOSIS — M25.552 LEFT HIP PAIN: Primary | ICD-10-CM

## 2022-04-27 NOTE — TELEPHONE ENCOUNTER
Is she having any back pain? ? If hip pain coming from back. We can try PT and send to ortho for further eval.

## 2022-04-27 NOTE — TELEPHONE ENCOUNTER
Spoke with patient and she does not have any back pain. Would like to proceed  with referral to ortho.

## 2022-05-05 ENCOUNTER — TELEPHONE (OUTPATIENT)
Dept: INTERNAL MEDICINE | Facility: CLINIC | Age: 67
End: 2022-05-05

## 2022-05-05 ENCOUNTER — HOSPITAL ENCOUNTER (OUTPATIENT)
Dept: GENERAL RADIOLOGY | Facility: HOSPITAL | Age: 67
Discharge: HOME | End: 2022-05-05
Admitting: PHYSICIAN ASSISTANT

## 2022-05-05 DIAGNOSIS — R05.9 COUGH: ICD-10-CM

## 2022-05-05 PROCEDURE — 71046 X-RAY EXAM CHEST 2 VIEWS: CPT

## 2022-05-05 NOTE — TELEPHONE ENCOUNTER
Caller: Kelly Strickland    Relationship: Self    Best call back number: [phone]    What is the best time to reach you: ANYTIME     Who are you requesting to speak with (clinical staff, provider,  specific staff member): CLINICAL STAFF     What was the call regarding: PATIENT IS CALLING TO MAKE THE OFFICE AWARE THAT SHE WENT TO THE URGENT CARE AND A CHEST X-RAY WAS ORDERED. THE X RAY WAS COMPLETED AND THE RESULTS SHOULD BE COMING TO THE OFFICE     Do you require a callback: NO

## 2022-05-20 RX ORDER — SERTRALINE HYDROCHLORIDE 25 MG/1
25 TABLET, FILM COATED ORAL DAILY
Qty: 90 TABLET | Refills: 3 | Status: SHIPPED | OUTPATIENT
Start: 2022-05-20 | End: 2022-10-17

## 2022-05-24 ENCOUNTER — OFFICE VISIT (OUTPATIENT)
Dept: ORTHOPEDIC SURGERY | Facility: CLINIC | Age: 67
End: 2022-05-24

## 2022-05-24 VITALS
WEIGHT: 175 LBS | SYSTOLIC BLOOD PRESSURE: 121 MMHG | DIASTOLIC BLOOD PRESSURE: 79 MMHG | BODY MASS INDEX: 29.16 KG/M2 | HEIGHT: 65 IN

## 2022-05-24 DIAGNOSIS — M70.62 TROCHANTERIC BURSITIS OF LEFT HIP: Primary | ICD-10-CM

## 2022-05-24 DIAGNOSIS — M16.12 PRIMARY OSTEOARTHRITIS OF LEFT HIP: ICD-10-CM

## 2022-05-24 PROCEDURE — 99204 OFFICE O/P NEW MOD 45 MIN: CPT | Performed by: ORTHOPAEDIC SURGERY

## 2022-05-24 RX ORDER — MELOXICAM 15 MG/1
TABLET ORAL
Qty: 90 TABLET | Refills: 1 | Status: SHIPPED | OUTPATIENT
Start: 2022-05-24 | End: 2022-06-30

## 2022-05-24 NOTE — PROGRESS NOTES
Orthopaedic Clinic Note: Hip New Patient    Chief Complaint   Patient presents with   • Left Hip - Pain, Initial Evaluation        HPI  Consult from: DO Kelly Rendon Em is a 67 y.o. female who presents with left hip pain for 6 month(s). Onset atraumatic and gradual in nature. Pain is localized to groin, lateral trochanter and is radiating down leg and is a 3/10 on the pain scale.Pain is described as dull and aching. Associated symptoms include pain and stiffness.  The pain is worse with walking, sitting, climbing stairs, sleeping, lying on affected side, rising from seated position and crossing left leg over right leg; pain medication and/or NSAID and leg extension improve the pain. Previous treatments have included: NSAIDS since symptom onset. Although some transient relief was reported with these interventions, these conservative measures have failed and symptoms have persisted. The patient is limited in daily activities and has had a significant decrease in quality of life as a result. She denies fevers, chills, or constitutional symptoms.    I have reviewed the following portions of the patient's history:History of Present Illness    Past Medical History:   Diagnosis Date   • Acid reflux    • Anxiety    • Basal cell carcinoma of skin    • Diverticulosis    • Herpes zoster    • Humeral fracture    • Migraine    • Nephrolithiasis    • Osteopenia    • Thoracic vertebral fracture (HCC)    • Vitamin D deficiency       Past Surgical History:   Procedure Laterality Date   • BREAST BIOPSY Left    • CHOLECYSTECTOMY     • COLONOSCOPY     • CYSTOSCOPY W/ LASER LITHOTRIPSY     • HERNIA REPAIR     • MOHS SURGERY     • ORIF HUMERAL SHAFT FRACTURE     • [place] OPEN TREATMENT PROX HUMERAL FRACTURE Right 3/13/2017    Procedure: OPEN REDUCTION INTERNAL FIXATION RIGHT HUMERUS;  Surgeon: Ryan Jimenes MD;  Location: [facility];  Service: Orthopedics   • TONSILLECTOMY        Family History   Problem  Relation Age of Onset   • Stroke Mother    • Hypertension Mother    • Osteoporosis Mother    • Kidney cancer Father    • Prostate cancer Father    • Diabetes type II Father    • Breast cancer Sister 59   • Cancer Sister         Breast   • Osteoporosis Maternal Aunt    • Breast cancer Maternal Aunt    • Colon cancer Maternal Aunt      Social History     Socioeconomic History   • Marital status:    Tobacco Use   • Smoking status: Never Smoker   • Smokeless tobacco: Never Used   Vaping Use   • Vaping Use: Never used   Substance and Sexual Activity   • Alcohol use: Yes     Alcohol/week: 6.0 standard drinks     Types: 6 Glasses of wine per week     Comment: occasionally   • Drug use: No   • Sexual activity: Yes     Partners: Male     Birth control/protection: None      Current Outpatient Medications on File Prior to Visit   Medication Sig Dispense Refill   • albuterol sulfate  (90 Base) MCG/ACT inhaler Inhale 2 puffs Every 6 (Six) Hours As Needed for Wheezing. 18 g 0   • buPROPion XL (WELLBUTRIN XL) 150 MG 24 hr tablet Take 1 tablet by mouth Daily. 90 tablet 3   • sertraline (Zoloft) 25 MG tablet Take 1 tablet by mouth Daily. 90 tablet 3   • [DISCONTINUED] azithromycin (ZITHROMAX) 250 MG tablet Take 2 tablets the first day, then 1 tablet daily for 4 days. 6 tablet 0     Current Facility-Administered Medications on File Prior to Visit   Medication Dose Route Frequency Provider Last Rate Last Admin   • bupivacaine PF (MARCAINE) 0.25 % injection    PRN Naveen Ruvalcaba CRNA   15 mL at 03/14/17 1200   • buprenorphine (BUPRENEX) injection    PRN Naveen Ruvalcaba CRNA   0.3 mg at 03/14/17 1200   • dexamethasone sodium phosphate injection   Peripheral Nerve PRN Naveen Ruvalcaba CRNA   2 mg at 03/14/17 1200      No Known Allergies     Review of Systems   Constitutional: Positive for activity change.   HENT: Negative.    Eyes: Negative.    Respiratory: Negative.    Cardiovascular: Negative.    Gastrointestinal:  "Negative.    Endocrine: Negative.    Genitourinary: Negative.    Musculoskeletal: Positive for arthralgias and gait problem.   Skin: Negative.    Allergic/Immunologic: Negative.    Hematological: Negative.    Psychiatric/Behavioral: Negative.         The patient's Review of Systems was personally reviewed and confirmed as accurate.    The following portions of the patient's history were reviewed and updated as appropriate: allergies, current medications, past family history, past medical history, past social history, past surgical history and problem list.    Physical Exam  Blood pressure 121/79, height 165.1 cm (65\"), weight 79.4 kg (175 lb), not currently breastfeeding.    Body mass index is 29.12 kg/m².    GENERAL APPEARANCE: awake, alert & oriented x 3, in no acute distress and well developed, well nourished  PSYCH: normal affect  LUNGS:  breathing nonlabored  EYES: sclera anicteric  CARDIOVASCULAR: palpable dorsalis pedis, palpable posterior tibial bilaterally. Capillary refill less than 2 seconds  EXTREMITIES: no clubbing, cyanosis  GAIT:  Normal           Right Hip Exam:  RANGE OF MOTION:   FLEXION CONTRACTURE: None   FLEXION: 110 degrees   INTERNAL ROTATION: 20 degrees at 90 degrees of flexion   EXTERNAL ROTATION: 40 degrees at 90 degrees of flexion    PAIN WITH HIP MOTION: no  PAIN WITH LOGROLL: no  STINCHFIELD TEST: negative    KNEE EXAM: full knee ROM (0-120 degrees), stable to varus and valgus stress at terminal extension and 30 degrees flexion     STRENGTH:  5/5 hip adduction, abduction, flexion. 5/5 strength knee flexion, extension. 5/5 strength ankle dorsiflexion and plantarflexion.     GREATER TROCHANTER BURSAL PAIN:  no     REFLEXES:   PATELLAR 2+/4   ACHILLES 2+/4    CLONUS: negative  STRAIGHT LEG TEST:   negative    SENSATION TO LIGHT TOUCH:  DEEP PERONEAL/SUPERFICIAL PERONEAL/SURAL/SAPHENOUS/TIBIAL:  intact    EDEMA:   no  ERYTHEMA:  no  WOUNDS/INCISIONS: no overlying skin problems.      Left Hip " Exam:   RANGE OF MOTION:   FLEXION CONTRACTURE: None   FLEXION: 110 degrees   INTERNAL ROTATION: 20 degrees at 90 degrees of flexion   EXTERNAL ROTATION: 40 degrees at 90 degrees of flexion    PAIN WITH HIP MOTION: no  PAIN WITH LOGROLL: no  STINCHFIELD TEST: negative    KNEE EXAM: full knee ROM (0-120 degrees), stable to varus and valgus stress at terminal extension and 30 degrees flexion     STRENGTH:  5/5 hip adduction, abduction, flexion. 5/5 strength knee flexion, extension. 5/5 strength ankle dorsiflexion and plantarflexion.     GREATER TROCHANTER BURSAL PAIN:  yes     REFLEXES:   PATELLAR 2+/4   ACHILLES 2+/4    CLONUS: negative  STRAIGHT LEG TEST:   negative    SENSATION TO LIGHT TOUCH:  DEEP PERONEAL/SUPERFICIAL PERONEAL/SURAL/SAPHENOUS/TIBIAL:  intact    EDEMA:   no  ERYTHEMA:  no  WOUNDS/INCISIONS: no overlying skin problems.      ------------------------------------------------------------------    LEG LENGTHS:  equal  _____________________________________________________  _____________________________________________________    RADIOGRAPHIC FINDINGS:   Left hip x-rays from 4/26/2022 were personally interpreted.  Radiographs demonstrate moderate bilateral hip joint space narrowing with minimal osteophyte formation.      Assessment/Plan:   Diagnosis Plan   1. Trochanteric bursitis of left hip  meloxicam (MOBIC) 15 MG tablet   2. Primary osteoarthritis of left hip  meloxicam (MOBIC) 15 MG tablet     Patient is well-preserved hip range of motion is minimally symptomatic.  The majority of her pain is localized to lateral trochanter.  I discussed treatment options with her regarding her ongoing hip pain.  I discussed prescription anti-inflammatory versus cortisone injection in the trochanteric bursa.  She is agreeable to prescription anti-inflammatory.  Meloxicam prescribed.  She has been instructed to return if the anti-inflammatory fails to adequately improve her pain and possibly proceed with the cortisone  injection.  She will follow-up as needed.    Da Greer MD  05/24/22  14:46 EDT

## 2022-06-30 ENCOUNTER — OFFICE VISIT (OUTPATIENT)
Dept: ORTHOPEDIC SURGERY | Facility: CLINIC | Age: 67
End: 2022-06-30

## 2022-06-30 VITALS
DIASTOLIC BLOOD PRESSURE: 80 MMHG | SYSTOLIC BLOOD PRESSURE: 122 MMHG | WEIGHT: 170 LBS | HEIGHT: 65 IN | BODY MASS INDEX: 28.32 KG/M2

## 2022-06-30 DIAGNOSIS — M17.11 PRIMARY OSTEOARTHRITIS OF RIGHT KNEE: ICD-10-CM

## 2022-06-30 DIAGNOSIS — M25.561 RIGHT KNEE PAIN, UNSPECIFIED CHRONICITY: ICD-10-CM

## 2022-06-30 DIAGNOSIS — M70.50 PES ANSERINE BURSITIS: Primary | ICD-10-CM

## 2022-06-30 PROCEDURE — 99214 OFFICE O/P EST MOD 30 MIN: CPT | Performed by: ORTHOPAEDIC SURGERY

## 2022-06-30 PROCEDURE — 20610 DRAIN/INJ JOINT/BURSA W/O US: CPT | Performed by: ORTHOPAEDIC SURGERY

## 2022-06-30 RX ORDER — LIDOCAINE HYDROCHLORIDE 10 MG/ML
3 INJECTION, SOLUTION EPIDURAL; INFILTRATION; INTRACAUDAL; PERINEURAL
Status: COMPLETED | OUTPATIENT
Start: 2022-06-30 | End: 2022-06-30

## 2022-06-30 RX ORDER — TRIAMCINOLONE ACETONIDE 40 MG/ML
80 INJECTION, SUSPENSION INTRA-ARTICULAR; INTRAMUSCULAR
Status: COMPLETED | OUTPATIENT
Start: 2022-06-30 | End: 2022-06-30

## 2022-06-30 RX ADMIN — TRIAMCINOLONE ACETONIDE 80 MG: 40 INJECTION, SUSPENSION INTRA-ARTICULAR; INTRAMUSCULAR at 10:50

## 2022-06-30 RX ADMIN — LIDOCAINE HYDROCHLORIDE 3 ML: 10 INJECTION, SOLUTION EPIDURAL; INFILTRATION; INTRACAUDAL; PERINEURAL at 10:50

## 2022-06-30 NOTE — PROGRESS NOTES
Procedure   Large Joint Arthrocentesis: R knee  Date/Time: 6/30/2022 10:50 AM  Consent given by: patient  Site marked: site marked  Timeout: Immediately prior to procedure a time out was called to verify the correct patient, procedure, equipment, support staff and site/side marked as required   Supporting Documentation  Indications: pain   Procedure Details  Location: knee - R knee  Preparation: Patient was prepped and draped in the usual sterile fashion  Needle size: 22 G  Approach: anterolateral  Medications administered: 3 mL lidocaine PF 1% 1 %; 80 mg triamcinolone acetonide 40 MG/ML (1 cc Marcaine .75% NDC: 7218-7368-81; LOT: [id number]; EXP; 04/01/2023)  Patient tolerance: patient tolerated the procedure well with no immediate complications

## 2022-06-30 NOTE — PROGRESS NOTES
Orthopaedic Clinic Note: Knee New Patient    Chief Complaint   Patient presents with   • Right Knee - Pain        HPI    Kelly Strickland is a 67 y.o. female who presents with right knee pain for 2 week(s). Onset atraumatic and gradual in nature. Pain is localized to the medial joint line and pes bursa and is a 10/10 on the pain scale. Pain is described as stabbing. Associated symptoms include pain, swelling, stiffness and giving way/buckling. The pain is worse with walking, standing, sitting, climbing stairs, sleeping, working, leisure, lying on affected side, rising from seated position and any movement of the joint; resting, ice and assistive device (cane/walker) make it better. Previous treatments have included: bracing, cane/walker and NSAIDS since symptom onset. Although some transient relief was reported with these interventions, these conservative measures have failed and symptoms have persisted. The patient is limited in daily activities and has had a significant decrease in quality of life as a result. She denies fevers, chills, or constitutional symptoms.    I have reviewed the following portions of the patient's history:History of Present Illness    Past Medical History:   Diagnosis Date   • Acid reflux    • Ankle sprain    • Anxiety    • Basal cell carcinoma of skin    • Bursitis of hip    • Diverticulosis    • Fracture of ankle    • Fracture, foot    • Herpes zoster    • Hip arthrosis    • Humeral fracture    • Migraine    • Nephrolithiasis    • Osteopenia    • Tennis elbow    • Thoracic vertebral fracture (HCC)    • Vitamin D deficiency       Past Surgical History:   Procedure Laterality Date   • BREAST BIOPSY Left    • CHOLECYSTECTOMY     • COLONOSCOPY     • CYSTOSCOPY W/ LASER LITHOTRIPSY     • HERNIA REPAIR     • MOHS SURGERY     • ORIF HUMERAL SHAFT FRACTURE     • [place] OPEN TREATMENT PROX HUMERAL FRACTURE Right 03/13/2017    Procedure: OPEN REDUCTION INTERNAL FIXATION RIGHT HUMERUS;  Surgeon:  Ryan Jimenes MD;  Location: [facility];  Service: Orthopedics   • TONSILLECTOMY        Family History   Problem Relation Age of Onset   • Stroke Mother    • Hypertension Mother    • Osteoporosis Mother    • Kidney cancer Father    • Prostate cancer Father    • Diabetes type II Father    • Breast cancer Sister 59   • Cancer Sister         Breast   • Osteoporosis Maternal Aunt    • Breast cancer Maternal Aunt    • Colon cancer Maternal Aunt    • Scoliosis Sister      Social History     Socioeconomic History   • Marital status:    Tobacco Use   • Smoking status: Never Smoker   • Smokeless tobacco: Never Used   Vaping Use   • Vaping Use: Never used   Substance and Sexual Activity   • Alcohol use: Yes     Alcohol/week: 6.0 standard drinks     Types: 6 Glasses of wine per week     Comment: occasionally   • Drug use: No   • Sexual activity: Yes     Partners: Male     Birth control/protection: None      Current Outpatient Medications on File Prior to Visit   Medication Sig Dispense Refill   • buPROPion XL (WELLBUTRIN XL) 150 MG 24 hr tablet Take 1 tablet by mouth Daily. 90 tablet 3   • sertraline (Zoloft) 25 MG tablet Take 1 tablet by mouth Daily. 90 tablet 3   • [DISCONTINUED] doxycycline (MONODOX) 100 MG capsule Take 1 capsule by mouth 2 (Two) Times a Day. 20 capsule 0   • [DISCONTINUED] meloxicam (MOBIC) 15 MG tablet 1 PO Daily with food. 90 tablet 1     Current Facility-Administered Medications on File Prior to Visit   Medication Dose Route Frequency Provider Last Rate Last Admin   • bupivacaine PF (MARCAINE) 0.25 % injection    PRN Naveen Ruvalcaba CRNA   15 mL at 03/14/17 1200   • buprenorphine (BUPRENEX) injection    PRN Naveen Ruvalcaba CRNA   0.3 mg at 03/14/17 1200   • dexamethasone sodium phosphate injection   Peripheral Nerve PRN Naveen Ruvalcaba CRNA   2 mg at 03/14/17 1200      No Known Allergies     Review of Systems   Constitutional: Negative.    HENT: Negative.    Eyes: Negative.   "  Respiratory: Negative.    Cardiovascular: Negative.    Gastrointestinal: Negative.    Endocrine: Negative.    Genitourinary: Negative.    Musculoskeletal: Positive for arthralgias.   Skin: Negative.    Allergic/Immunologic: Negative.    Neurological: Negative.    Hematological: Negative.    Psychiatric/Behavioral: Negative.         The patient's Review of Systems was personally reviewed and confirmed as accurate.    The following portions of the patient's history were reviewed and updated as appropriate: allergies, current medications, past family history, past medical history, past social history, past surgical history and problem list.    Physical Exam  Blood pressure 122/80, height 165.1 cm (65\"), weight 77.1 kg (170 lb), not currently breastfeeding.    Body mass index is 28.29 kg/m².    GENERAL APPEARANCE: awake, alert & oriented x 3, in no acute distress and well developed, well nourished  PSYCH: normal affect  LUNGS:  breathing nonlabored  EYES: sclera anicteric  CARDIOVASCULAR: palpable dorsalis pedis, palpable posterior tibial bilaterally. Capillary refill less than 2 seconds  EXTREMITIES: no clubbing, cyanosis  GAIT:  Antalgic            Right Lower Extremity Exam:   ----------  Hip Exam  ----------  FLEXION CONTRACTURE: None  FLEXION: 110 degrees  INTERNAL ROTATION: 20 degrees at 90 degrees of flexion   EXTERNAL ROTATION: 40 degrees at 90 degrees of flexion    PAIN WITH HIP MOTION: no  ----------  Knee Exam  ----------  ALIGNMENT: moderate varus, correctible to neutral    RANGE OF MOTION:  Normal (0-120 degrees) with no extensor lag or flexion contracture  LIGAMENTOUS STABILITY:   stable to varus and valgus stress at terminal extension and 30 degrees; retensioning of the MCL is appreciated with valgus stress at 30 degrees consistent with medial compartment degeneration     STRENGTH:  4/5 knee flexion, extension. 5/5 ankle dorsiflexion and plantarflexion.     PAIN WITH PALPATION: medial joint line and pes " bursa  KNEE EFFUSION: yes, trace effusion  PAIN WITH KNEE ROM: yes, medially  PATELLAR CREPITUS: yes, painful and symptomatic  SPECIAL EXAM FINDINGS:  none    REFLEXES:  PATELLAR 2+/4  ACHILLES 2+/4    CLONUS: no  STRAIGHT LEG TEST:   negative    SENSATION TO LIGHT TOUCH:  DEEP PERONEAL/SUPERFICIAL PERONEAL/SURAL/SAPHENOUS/TIBIAL:   intact    EDEMA:  no  ERYTHEMA:  no  WOUNDS/INCISIONS:  no        Left Lower Extremity Exam:   ----------  Hip Exam  ----------  FLEXION CONTRACTURE: None  FLEXION: 110 degrees  INTERNAL ROTATION: 20 degrees at 90 degrees of flexion   EXTERNAL ROTATION: 40 degrees at 90 degrees of flexion    PAIN WITH HIP MOTION: no  ----------  Knee Exam  ----------  ALIGNMENT: Severe varus, correctable to neutral    RANGE OF MOTION:  Normal (0-120 degrees) with no extensor lag or flexion contracture  LIGAMENTOUS STABILITY:   stable to varus and valgus stress at terminal extension and 30 degrees without any evidence of laxity; retensioning MCL valgus stress consistent medial compartment degeneration    STRENGTH:  5/5 knee flexion, extension. 5/5 ankle dorsiflexion and plantarflexion.     PAIN WITH PALPATION: denies tenderness to palpation about the knee, denies medial or lateral joint line pain  KNEE EFFUSION: no  PAIN WITH KNEE ROM: no  PATELLAR CREPITUS: no  SPECIAL EXAM FINDINGS:  Negative patellar compression    REFLEXES:  PATELLAR 2+/4  ACHILLES 2+/4    CLONUS: negative  STRAIGHT LEG TEST:   negative    SENSATION TO LIGHT TOUCH:  DEEP PERONEAL/SUPERFICIAL PERONEAL/SURAL/SAPHENOUS/TIBIAL:   intact    EDEMA:  no  ERYTHEMA:  no  WOUNDS/INCISIONS: no overlying skin problems.    ______________________________________________________________________  ______________________________________________________________________    RADIOGRAPHIC FINDINGS:   Indication: Right knee pain    Comparison: No prior xrays are available for comparison    Right knee(s) 4 views: moderate tricompartmental osteoarthritis with  genu varum alignment.  No acute bony injury or fracture.      Assessment/Plan:   Diagnosis Plan   1. Pes anserine bursitis     2. Right knee pain, unspecified chronicity  XR Knee 4+ View Right   3. Primary osteoarthritis of right knee       Patient has mild to moderate knee arthritis that is mildly symptomatic today.  The majority of her pain is localized to the pes bursa.  Therefore believe she is suffering from pes bursitis.  I discussed treatment options.  She is agreeable to pes bursa cortisone injection today.  She will follow-up in 3 weeks for reassessment.    Procedure Note:  I discussed with the patient the potential benefits of performing a therapeutic injection of the right knee pes bursa as well as potential risks including but not limited to infection, swelling, pain, bleeding, bruising, nerve/vessel damage, skin color changes, transient elevation in blood glucose levels, and fat atrophy. After informed consent and after the area was prepped with alcohol, ethyl chloride was used to numb the skin. Via the anteromedial approach, 2 cc of 1% lidocaine, 1 cc of 0.75% bupivicaine and 1 cc of 40mg/ml of Kenalog were injected into the right knee pes bursa. The patient tolerated the procedure well. There were no complications. A sterile dressing was placed over the injection site.    Da Greer MD  06/30/22  10:48 EDT

## 2022-07-21 ENCOUNTER — OFFICE VISIT (OUTPATIENT)
Dept: ORTHOPEDIC SURGERY | Facility: CLINIC | Age: 67
End: 2022-07-21

## 2022-07-21 VITALS
DIASTOLIC BLOOD PRESSURE: 85 MMHG | HEIGHT: 65 IN | WEIGHT: 169.97 LBS | SYSTOLIC BLOOD PRESSURE: 139 MMHG | BODY MASS INDEX: 28.32 KG/M2

## 2022-07-21 DIAGNOSIS — M17.11 PRIMARY OSTEOARTHRITIS OF RIGHT KNEE: Primary | ICD-10-CM

## 2022-07-21 DIAGNOSIS — M70.50 PES ANSERINE BURSITIS: ICD-10-CM

## 2022-07-21 PROCEDURE — 20610 DRAIN/INJ JOINT/BURSA W/O US: CPT | Performed by: ORTHOPAEDIC SURGERY

## 2022-07-21 PROCEDURE — 99213 OFFICE O/P EST LOW 20 MIN: CPT | Performed by: ORTHOPAEDIC SURGERY

## 2022-07-21 RX ORDER — TRIAMCINOLONE ACETONIDE 40 MG/ML
80 INJECTION, SUSPENSION INTRA-ARTICULAR; INTRAMUSCULAR
Status: COMPLETED | OUTPATIENT
Start: 2022-07-21 | End: 2022-07-21

## 2022-07-21 RX ORDER — LIDOCAINE HYDROCHLORIDE 10 MG/ML
3 INJECTION, SOLUTION EPIDURAL; INFILTRATION; INTRACAUDAL; PERINEURAL
Status: COMPLETED | OUTPATIENT
Start: 2022-07-21 | End: 2022-07-21

## 2022-07-21 RX ADMIN — TRIAMCINOLONE ACETONIDE 80 MG: 40 INJECTION, SUSPENSION INTRA-ARTICULAR; INTRAMUSCULAR at 07:38

## 2022-07-21 RX ADMIN — LIDOCAINE HYDROCHLORIDE 3 ML: 10 INJECTION, SOLUTION EPIDURAL; INFILTRATION; INTRACAUDAL; PERINEURAL at 07:38

## 2022-07-21 NOTE — PROGRESS NOTES
Orthopaedic Clinic Note: Knee Established Patient    Chief Complaint   Patient presents with   • Follow-up     3 week recheck - Primary osteoarthritis of right knee & Pes anserine bursitis         HPI    It has been 3  week(s) since Ms. Strickland's last visit. She returns to clinic today for follow-up right knee osteoarthritis and pes bursitis.  At last visit she underwent pes bursa cortisone injection.  The injection worked well in alleviating a large portion of her pain.  However she is still having some medial joint line pain and stiffness that is limiting daily activities and her ability to walk long distances.  She is ambulatory no assistive device.  Denies fevers chills or constitutional symptoms.    Past Medical History:   Diagnosis Date   • Acid reflux    • Ankle sprain    • Anxiety    • Basal cell carcinoma of skin    • Bursitis of hip    • Diverticulosis    • Fracture of ankle    • Fracture, foot    • Herpes zoster    • Hip arthrosis    • Humeral fracture    • Migraine    • Nephrolithiasis    • Osteopenia    • Tennis elbow    • Thoracic vertebral fracture (HCC)    • Vitamin D deficiency       Past Surgical History:   Procedure Laterality Date   • BREAST BIOPSY Left    • CHOLECYSTECTOMY     • COLONOSCOPY     • CYSTOSCOPY W/ LASER LITHOTRIPSY     • HERNIA REPAIR     • MOHS SURGERY     • ORIF HUMERAL SHAFT FRACTURE     • [place] OPEN TREATMENT PROX HUMERAL FRACTURE Right 03/13/2017    Procedure: OPEN REDUCTION INTERNAL FIXATION RIGHT HUMERUS;  Surgeon: yRan Jimenes MD;  Location: [facility];  Service: Orthopedics   • TONSILLECTOMY        Family History   Problem Relation Age of Onset   • Stroke Mother    • Hypertension Mother    • Osteoporosis Mother    • Kidney cancer Father    • Prostate cancer Father    • Diabetes type II Father    • Breast cancer Sister 59   • Cancer Sister         Breast   • Osteoporosis Maternal Aunt    • Breast cancer Maternal Aunt    • Colon cancer Maternal Aunt    • Scoliosis  "Sister      Social History     Socioeconomic History   • Marital status:    Tobacco Use   • Smoking status: Never Smoker   • Smokeless tobacco: Never Used   Vaping Use   • Vaping Use: Never used   Substance and Sexual Activity   • Alcohol use: Yes     Alcohol/week: 6.0 standard drinks     Types: 6 Glasses of wine per week     Comment: occasionally   • Drug use: No   • Sexual activity: Yes     Partners: Male     Birth control/protection: None      Current Outpatient Medications on File Prior to Visit   Medication Sig Dispense Refill   • buPROPion XL (WELLBUTRIN XL) 150 MG 24 hr tablet Take 1 tablet by mouth Daily. 90 tablet 3   • sertraline (Zoloft) 25 MG tablet Take 1 tablet by mouth Daily. 90 tablet 3     Current Facility-Administered Medications on File Prior to Visit   Medication Dose Route Frequency Provider Last Rate Last Admin   • bupivacaine PF (MARCAINE) 0.25 % injection    PRN Naveen Ruvalcaba CRNA   15 mL at 03/14/17 1200   • buprenorphine (BUPRENEX) injection    PRN Naveen Ruvalcaba CRNA   0.3 mg at 03/14/17 1200   • dexamethasone sodium phosphate injection   Peripheral Nerve PRN Naveen Ruvalcaba CRNA   2 mg at 03/14/17 1200      No Known Allergies     Review of Systems   Constitutional: Negative.    HENT: Negative.    Eyes: Negative.    Respiratory: Negative.    Cardiovascular: Negative.    Gastrointestinal: Negative.    Endocrine: Negative.    Genitourinary: Negative.    Musculoskeletal: Positive for arthralgias.   Skin: Negative.    Allergic/Immunologic: Negative.    Neurological: Negative.    Hematological: Negative.    Psychiatric/Behavioral: Negative.         The patient's Review of Systems was personally reviewed and confirmed as accurate.    Physical Exam  Blood pressure 139/85, height 165.1 cm (65\"), weight 77.1 kg (169 lb 15.6 oz), not currently breastfeeding.    Body mass index is 28.29 kg/m².    GENERAL APPEARANCE: awake, alert, oriented, in no acute distress and well developed, well " nourished  LUNGS:  breathing nonlabored  EXTREMITIES: no clubbing, cyanosis  PERIPHERAL PULSES: palpable dorsalis pedis and posterior tibial pulses bilaterally.    GAIT:  Normal        ----------  Right Knee Exam:  ----------  ALIGNMENT: moderate varus, correctible to neutral  ----------  RANGE OF MOTION:  Normal (0-120 degrees) with no extensor lag or flexion contracture  LIGAMENTOUS STABILITY:   stable to varus and valgus stress at terminal extension and 30 degrees; retensioning of the MCL is appreciated with valgus stress at 30 degrees consistent with medial compartment degeneration  ----------  STRENGTH:  KNEE FLEXION 5/5  KNEE EXTENSION  5/5  ANKLE DORSIFLEXION  5/5  ANKLE PLANTARFLEXION  5/5  ----------  PAIN WITH PALPATION:medial joint line  KNEE EFFUSION: yes, trace effusion  PAIN WITH KNEE ROM: yes  PATELLAR CREPITUS:  no  ----------  SENSATION TO LIGHT TOUCH:  DEEP PERONEAL/SUPERFICIAL PERONEAL/SURAL/SAPHENOUS/TIBIAL:    intact  ----------  EDEMA:  no  ERYTHEMA:    no  WOUNDS/INCISIONS:   no  _____________________________________________________________________  _____________________________________________________________________    RADIOGRAPHIC FINDINGS:   No new imaging today    Assessment/Plan:   Diagnosis Plan   1. Primary osteoarthritis of right knee     2. Pes anserine bursitis       I discussed additional treatment options with the patient regarding her residual knee pain.  Her pes bursa inflammation has significantly improved.  She does have knee osteoarthritis that is likely a result of her residual discomfort.  She has medial joint space narrowing which is where she is localizing the majority of her discomfort at this time.  I discussed treatment options with her regarding her ongoing knee pain including prescription anti-inflammatories, activity modifications, therapy.  She is agreeable to over-the-counter anti-inflammatories as well as intra-articular cortisone injection the right knee today.   She will follow-up as needed.    Procedure Note:  I discussed with the patient the potential benefits of performing a therapeutic injection of the right knee as well as potential risks including but not limited to infection, swelling, pain, bleeding, bruising, nerve/vessel damage, skin color changes, transient elevation in blood glucose levels, and fat atrophy. After informed consent and verifying correct patient, procedure site, and type of procedure, the area was prepped with alcohol, ethyl chloride was used to numb the skin. Via the superolateral approach, 3cc of 1% lidocaine, 1 cc of 0.75% Marcaine and 2 cc of 40mg/ml of Kenalog were injected into the right knee. The patient tolerated the procedure well. There were no complications. A sterile dressing was placed over the injection site.        Da Greer MD  07/21/22  07:39 EDT

## 2022-07-21 NOTE — PROGRESS NOTES
Procedure   Large Joint Arthrocentesis: R knee  Date/Time: 7/21/2022 7:38 AM  Consent given by: patient  Site marked: site marked  Timeout: Immediately prior to procedure a time out was called to verify the correct patient, procedure, equipment, support staff and site/side marked as required   Supporting Documentation  Indications: pain   Procedure Details  Location: knee - R knee  Preparation: Patient was prepped and draped in the usual sterile fashion  Needle size: 22 G  Approach: anterolateral  Medications administered: 80 mg triamcinolone acetonide 40 MG/ML; 3 mL lidocaine PF 1% 1 % (1cc Marcaine .75% NDC 0684-9181-30  Lot [id number] 4/1/23)  Patient tolerance: patient tolerated the procedure well with no immediate complications

## 2022-10-17 ENCOUNTER — OFFICE VISIT (OUTPATIENT)
Dept: INTERNAL MEDICINE | Facility: CLINIC | Age: 67
End: 2022-10-17

## 2022-10-17 ENCOUNTER — HOSPITAL ENCOUNTER (OUTPATIENT)
Dept: CARDIOLOGY | Facility: HOSPITAL | Age: 67
Discharge: HOME | End: 2022-10-17
Admitting: INTERNAL MEDICINE

## 2022-10-17 VITALS
TEMPERATURE: 97.7 F | DIASTOLIC BLOOD PRESSURE: 82 MMHG | OXYGEN SATURATION: 98 % | SYSTOLIC BLOOD PRESSURE: 126 MMHG | WEIGHT: 166 LBS | HEIGHT: 65 IN | BODY MASS INDEX: 27.66 KG/M2 | HEART RATE: 73 BPM

## 2022-10-17 VITALS — BODY MASS INDEX: 26.06 KG/M2 | HEIGHT: 67 IN | WEIGHT: 166 LBS

## 2022-10-17 DIAGNOSIS — R20.9 ABNORMAL SENSATION OF LEG: Primary | ICD-10-CM

## 2022-10-17 DIAGNOSIS — R20.9 ABNORMAL SENSATION OF LEG: ICD-10-CM

## 2022-10-17 LAB
BH CV LOWER VASCULAR LEFT COMMON FEMORAL AUGMENT: NORMAL
BH CV LOWER VASCULAR LEFT COMMON FEMORAL COMPRESS: NORMAL
BH CV LOWER VASCULAR LEFT COMMON FEMORAL PHASIC: NORMAL
BH CV LOWER VASCULAR LEFT COMMON FEMORAL SPONT: NORMAL
BH CV LOWER VASCULAR LEFT DISTAL FEMORAL AUGMENT: NORMAL
BH CV LOWER VASCULAR LEFT DISTAL FEMORAL COMPRESS: NORMAL
BH CV LOWER VASCULAR LEFT DISTAL FEMORAL PHASIC: NORMAL
BH CV LOWER VASCULAR LEFT DISTAL FEMORAL SPONT: NORMAL
BH CV LOWER VASCULAR LEFT GASTRONEMIUS COMPRESS: NORMAL
BH CV LOWER VASCULAR LEFT GREATER SAPH AK COMPRESS: NORMAL
BH CV LOWER VASCULAR LEFT GREATER SAPH BK COMPRESS: NORMAL
BH CV LOWER VASCULAR LEFT LESSER SAPH COMPRESS: NORMAL
BH CV LOWER VASCULAR LEFT MID FEMORAL AUGMENT: NORMAL
BH CV LOWER VASCULAR LEFT MID FEMORAL COMPRESS: NORMAL
BH CV LOWER VASCULAR LEFT MID FEMORAL PHASIC: NORMAL
BH CV LOWER VASCULAR LEFT MID FEMORAL SPONT: NORMAL
BH CV LOWER VASCULAR LEFT PERONEAL COMPRESS: NORMAL
BH CV LOWER VASCULAR LEFT POPLITEAL AUGMENT: NORMAL
BH CV LOWER VASCULAR LEFT POPLITEAL COMPRESS: NORMAL
BH CV LOWER VASCULAR LEFT POPLITEAL PHASIC: NORMAL
BH CV LOWER VASCULAR LEFT POPLITEAL SPONT: NORMAL
BH CV LOWER VASCULAR LEFT POSTERIOR TIBIAL COMPRESS: NORMAL
BH CV LOWER VASCULAR LEFT PROFUNDA FEMORAL AUGMENT: NORMAL
BH CV LOWER VASCULAR LEFT PROFUNDA FEMORAL COMPRESS: NORMAL
BH CV LOWER VASCULAR LEFT PROFUNDA FEMORAL PHASIC: NORMAL
BH CV LOWER VASCULAR LEFT PROFUNDA FEMORAL SPONT: NORMAL
BH CV LOWER VASCULAR LEFT PROXIMAL FEMORAL AUGMENT: NORMAL
BH CV LOWER VASCULAR LEFT PROXIMAL FEMORAL COMPRESS: NORMAL
BH CV LOWER VASCULAR LEFT PROXIMAL FEMORAL PHASIC: NORMAL
BH CV LOWER VASCULAR LEFT PROXIMAL FEMORAL SPONT: NORMAL
BH CV LOWER VASCULAR LEFT SAPHENOFEMORAL JUNCTION AUGMENT: NORMAL
BH CV LOWER VASCULAR LEFT SAPHENOFEMORAL JUNCTION COMPRESS: NORMAL
BH CV LOWER VASCULAR LEFT SAPHENOFEMORAL JUNCTION PHASIC: NORMAL
BH CV LOWER VASCULAR LEFT SAPHENOFEMORAL JUNCTION SPONT: NORMAL
BH CV LOWER VASCULAR LEFT SOLEAL COMPRESS: NORMAL
BH CV LOWER VASCULAR RIGHT COMMON FEMORAL AUGMENT: NORMAL
BH CV LOWER VASCULAR RIGHT COMMON FEMORAL COMPRESS: NORMAL
BH CV LOWER VASCULAR RIGHT COMMON FEMORAL PHASIC: NORMAL
BH CV LOWER VASCULAR RIGHT COMMON FEMORAL SPONT: NORMAL
BH CV LOWER VASCULAR RIGHT PROFUNDA FEMORAL AUGMENT: NORMAL
BH CV LOWER VASCULAR RIGHT PROFUNDA FEMORAL PHASIC: NORMAL
BH CV LOWER VASCULAR RIGHT PROFUNDA FEMORAL SPONT: NORMAL
BH CV LOWER VASCULAR RIGHT SAPHENOFEMORAL JUNCTION AUGMENT: NORMAL
BH CV LOWER VASCULAR RIGHT SAPHENOFEMORAL JUNCTION PHASIC: NORMAL
BH CV LOWER VASCULAR RIGHT SAPHENOFEMORAL JUNCTION SPONT: NORMAL
MAXIMAL PREDICTED HEART RATE: 153 BPM
STRESS TARGET HR: 130 BPM

## 2022-10-17 PROCEDURE — 93971 EXTREMITY STUDY: CPT

## 2022-10-17 PROCEDURE — 93971 EXTREMITY STUDY: CPT | Performed by: INTERNAL MEDICINE

## 2022-10-17 PROCEDURE — 99213 OFFICE O/P EST LOW 20 MIN: CPT | Performed by: INTERNAL MEDICINE

## 2022-10-17 RX ORDER — SERTRALINE HYDROCHLORIDE 100 MG/1
100 TABLET, FILM COATED ORAL DAILY
COMMUNITY
Start: 2022-07-30 | End: 2023-03-02 | Stop reason: SDUPTHER

## 2022-10-17 NOTE — PROGRESS NOTES
Subjective   Kelly Strickland is a 67 y.o. female.   Chief Complaint   Patient presents with   • Leg Problem     Left leg. Feels like heat        History of Present Illness   Feeling of  Hot in left lower extremity. Comes and goes. No pain. No fevers. No pain associated with it. No discoloration of leg. No trauma.   The following portions of the patient's history were reviewed and updated as appropriate: allergies, current medications, past family history, past medical history, past social history, past surgical history and problem list.  Past Medical History:   Diagnosis Date   • Acid reflux    • Ankle sprain    • Anxiety    • Basal cell carcinoma of skin    • Bursitis of hip    • Diverticulosis    • Fracture of ankle    • Fracture, foot    • Herpes zoster    • Hip arthrosis    • Humeral fracture    • Migraine    • Nephrolithiasis    • Osteopenia    • Tennis elbow    • Thoracic vertebral fracture (HCC)    • Vitamin D deficiency      Past Surgical History:   Procedure Laterality Date   • BREAST BIOPSY Left    • CHOLECYSTECTOMY     • COLONOSCOPY     • CYSTOSCOPY W/ LASER LITHOTRIPSY     • HERNIA REPAIR     • MOHS SURGERY     • ORIF HUMERAL SHAFT FRACTURE     • [place] OPEN TREATMENT PROX HUMERAL FRACTURE Right 03/13/2017    Procedure: OPEN REDUCTION INTERNAL FIXATION RIGHT HUMERUS;  Surgeon: Ryan Jimenes MD;  Location: [facility];  Service: Orthopedics   • TONSILLECTOMY       Family History   Problem Relation Age of Onset   • Stroke Mother    • Hypertension Mother    • Osteoporosis Mother    • Kidney cancer Father    • Prostate cancer Father    • Diabetes type II Father    • Breast cancer Sister 59   • Cancer Sister         Breast   • Osteoporosis Maternal Aunt    • Breast cancer Maternal Aunt    • Colon cancer Maternal Aunt    • Scoliosis Sister      Social History     Socioeconomic History   • Marital status:    Tobacco Use   • Smoking status: Never   • Smokeless tobacco: Never   Vaping Use   • Vaping  "Use: Never used   Substance and Sexual Activity   • Alcohol use: Yes     Alcohol/week: 6.0 standard drinks     Types: 6 Glasses of wine per week     Comment: occasionally   • Drug use: No   • Sexual activity: Yes     Partners: Male     Birth control/protection: None     .  Review of Systems   Constitutional: Negative for fatigue and fever.   Respiratory: Negative for shortness of breath and wheezing.    Cardiovascular: Negative for chest pain, palpitations and leg swelling.   Skin:        Feeling in left lower leg     /82   Pulse 73   Temp 97.7 °F (36.5 °C)   Ht 165.1 cm (65\")   Wt 75.3 kg (166 lb)   SpO2 98%   BMI 27.62 kg/m²     Objective   Physical Exam  Vitals reviewed.   Cardiovascular:      Rate and Rhythm: Normal rate and regular rhythm.   Pulmonary:      Effort: No respiratory distress.      Breath sounds: No wheezing or rales.   Skin:     Coloration: Skin is not jaundiced or pale.      Findings: No bruising, erythema, lesion or rash.      Comments: Cool to touch   Neurological:      Mental Status: She is alert.         Assessment & Plan   Diagnoses and all orders for this visit:    1. Abnormal sensation of leg (Primary)  -     Duplex Venous Lower Extremity - Left CAR; Future               "

## 2022-10-18 ENCOUNTER — TELEPHONE (OUTPATIENT)
Dept: INTERNAL MEDICINE | Facility: CLINIC | Age: 67
End: 2022-10-18

## 2022-10-18 NOTE — TELEPHONE ENCOUNTER
----- Message from Mary Antonio DO sent at 10/18/2022  7:54 AM EDT -----  Let know doppler negative.

## 2022-11-19 ENCOUNTER — HOSPITAL ENCOUNTER (OUTPATIENT)
Facility: HOSPITAL | Age: 67
Discharge: HOME | DRG: 392 | End: 2022-11-21
Attending: EMERGENCY MEDICINE | Admitting: PEDIATRICS
Payer: COMMERCIAL

## 2022-11-19 ENCOUNTER — APPOINTMENT (OUTPATIENT)
Dept: CT IMAGING | Facility: HOSPITAL | Age: 67
DRG: 392 | End: 2022-11-19
Payer: COMMERCIAL

## 2022-11-19 DIAGNOSIS — R10.30 LOWER ABDOMINAL PAIN: ICD-10-CM

## 2022-11-19 DIAGNOSIS — K57.92 ACUTE DIVERTICULITIS: Primary | ICD-10-CM

## 2022-11-19 LAB
ALBUMIN SERPL-MCNC: 4.3 G/DL (ref 3.5–5.2)
ALBUMIN/GLOB SERPL: 1.4 G/DL
ALP SERPL-CCNC: 153 U/L (ref 39–117)
ALT SERPL W P-5'-P-CCNC: 29 U/L (ref 1–33)
ANION GAP SERPL CALCULATED.3IONS-SCNC: 11 MMOL/L (ref 5–15)
AST SERPL-CCNC: 27 U/L (ref 1–32)
BACTERIA UR QL AUTO: NORMAL /HPF
BASOPHILS # BLD AUTO: 0.02 10*3/MM3 (ref 0–0.2)
BASOPHILS NFR BLD AUTO: 0.2 % (ref 0–1.5)
BILIRUB SERPL-MCNC: 2.2 MG/DL (ref 0–1.2)
BILIRUB UR QL STRIP: NEGATIVE
BUN SERPL-MCNC: 11 MG/DL (ref 8–23)
BUN/CREAT SERPL: 14.5 (ref 7–25)
CALCIUM SPEC-SCNC: 9.1 MG/DL (ref 8.6–10.5)
CHLORIDE SERPL-SCNC: 101 MMOL/L (ref 98–107)
CLARITY UR: ABNORMAL
CO2 SERPL-SCNC: 26 MMOL/L (ref 22–29)
COLOR UR: YELLOW
CREAT SERPL-MCNC: 0.76 MG/DL (ref 0.57–1)
D-LACTATE SERPL-SCNC: 1.1 MMOL/L (ref 0.5–2)
DEPRECATED RDW RBC AUTO: 43.8 FL (ref 37–54)
EGFRCR SERPLBLD CKD-EPI 2021: 86 ML/MIN/1.73
EOSINOPHIL # BLD AUTO: 0.08 10*3/MM3 (ref 0–0.4)
EOSINOPHIL NFR BLD AUTO: 0.7 % (ref 0.3–6.2)
ERYTHROCYTE [DISTWIDTH] IN BLOOD BY AUTOMATED COUNT: 12.5 % (ref 12.3–15.4)
GLOBULIN UR ELPH-MCNC: 3.1 GM/DL
GLUCOSE SERPL-MCNC: 111 MG/DL (ref 65–99)
GLUCOSE UR STRIP-MCNC: NEGATIVE MG/DL
HCT VFR BLD AUTO: 42.1 % (ref 34–46.6)
HGB BLD-MCNC: 14.1 G/DL (ref 12–15.9)
HGB UR QL STRIP.AUTO: NEGATIVE
HOLD SPECIMEN: NORMAL
HYALINE CASTS UR QL AUTO: NORMAL /LPF
IMM GRANULOCYTES # BLD AUTO: 0.06 10*3/MM3 (ref 0–0.05)
IMM GRANULOCYTES NFR BLD AUTO: 0.5 % (ref 0–0.5)
KETONES UR QL STRIP: NEGATIVE
LEUKOCYTE ESTERASE UR QL STRIP.AUTO: ABNORMAL
LIPASE SERPL-CCNC: 20 U/L (ref 13–60)
LYMPHOCYTES # BLD AUTO: 1.4 10*3/MM3 (ref 0.7–3.1)
LYMPHOCYTES NFR BLD AUTO: 12.3 % (ref 19.6–45.3)
MCH RBC QN AUTO: 31.8 PG (ref 26.6–33)
MCHC RBC AUTO-ENTMCNC: 33.5 G/DL (ref 31.5–35.7)
MCV RBC AUTO: 95 FL (ref 79–97)
MONOCYTES # BLD AUTO: 0.76 10*3/MM3 (ref 0.1–0.9)
MONOCYTES NFR BLD AUTO: 6.7 % (ref 5–12)
NEUTROPHILS NFR BLD AUTO: 79.6 % (ref 42.7–76)
NEUTROPHILS NFR BLD AUTO: 9.09 10*3/MM3 (ref 1.7–7)
NITRITE UR QL STRIP: NEGATIVE
NRBC BLD AUTO-RTO: 0 /100 WBC (ref 0–0.2)
PH UR STRIP.AUTO: 5.5 [PH] (ref 5–8)
PLATELET # BLD AUTO: 298 10*3/MM3 (ref 140–450)
PMV BLD AUTO: 9 FL (ref 6–12)
POTASSIUM SERPL-SCNC: 4.1 MMOL/L (ref 3.5–5.2)
PROT SERPL-MCNC: 7.4 G/DL (ref 6–8.5)
PROT UR QL STRIP: NEGATIVE
RBC # BLD AUTO: 4.43 10*6/MM3 (ref 3.77–5.28)
RBC # UR STRIP: NORMAL /HPF
REF LAB TEST METHOD: NORMAL
SODIUM SERPL-SCNC: 138 MMOL/L (ref 136–145)
SP GR UR STRIP: 1.02 (ref 1–1.03)
SQUAMOUS #/AREA URNS HPF: NORMAL /HPF
UROBILINOGEN UR QL STRIP: ABNORMAL
WBC # UR STRIP: NORMAL /HPF
WBC NRBC COR # BLD: 11.41 10*3/MM3 (ref 3.4–10.8)
WHOLE BLOOD HOLD COAG: NORMAL
WHOLE BLOOD HOLD SPECIMEN: NORMAL

## 2022-11-19 PROCEDURE — 99222 1ST HOSP IP/OBS MODERATE 55: CPT | Performed by: INTERNAL MEDICINE

## 2022-11-19 PROCEDURE — 87040 BLOOD CULTURE FOR BACTERIA: CPT | Performed by: EMERGENCY MEDICINE

## 2022-11-19 PROCEDURE — 83690 ASSAY OF LIPASE: CPT | Performed by: EMERGENCY MEDICINE

## 2022-11-19 PROCEDURE — 80053 COMPREHEN METABOLIC PANEL: CPT | Performed by: EMERGENCY MEDICINE

## 2022-11-19 PROCEDURE — 83605 ASSAY OF LACTIC ACID: CPT | Performed by: EMERGENCY MEDICINE

## 2022-11-19 PROCEDURE — 96375 TX/PRO/DX INJ NEW DRUG ADDON: CPT

## 2022-11-19 PROCEDURE — 96372 THER/PROPH/DIAG INJ SC/IM: CPT

## 2022-11-19 PROCEDURE — 81001 URINALYSIS AUTO W/SCOPE: CPT | Performed by: EMERGENCY MEDICINE

## 2022-11-19 PROCEDURE — 25010000002 PIPERACILLIN SOD-TAZOBACTAM PER 1 G: Performed by: INTERNAL MEDICINE

## 2022-11-19 PROCEDURE — 99284 EMERGENCY DEPT VISIT MOD MDM: CPT

## 2022-11-19 PROCEDURE — 25010000002 KETOROLAC TROMETHAMINE PER 15 MG: Performed by: EMERGENCY MEDICINE

## 2022-11-19 PROCEDURE — 25010000002 ENOXAPARIN PER 10 MG: Performed by: INTERNAL MEDICINE

## 2022-11-19 PROCEDURE — 85025 COMPLETE CBC W/AUTO DIFF WBC: CPT | Performed by: EMERGENCY MEDICINE

## 2022-11-19 PROCEDURE — 74176 CT ABD & PELVIS W/O CONTRAST: CPT

## 2022-11-19 PROCEDURE — 96374 THER/PROPH/DIAG INJ IV PUSH: CPT

## 2022-11-19 PROCEDURE — 25010000002 ONDANSETRON PER 1 MG: Performed by: EMERGENCY MEDICINE

## 2022-11-19 PROCEDURE — 36415 COLL VENOUS BLD VENIPUNCTURE: CPT

## 2022-11-19 PROCEDURE — 25010000002 PIPERACILLIN SOD-TAZOBACTAM PER 1 G: Performed by: EMERGENCY MEDICINE

## 2022-11-19 RX ORDER — ONDANSETRON 2 MG/ML
4 INJECTION INTRAMUSCULAR; INTRAVENOUS EVERY 6 HOURS PRN
Status: DISCONTINUED | OUTPATIENT
Start: 2022-11-19 | End: 2022-11-21 | Stop reason: HOSPADM

## 2022-11-19 RX ORDER — MAGNESIUM SULFATE HEPTAHYDRATE 40 MG/ML
4 INJECTION, SOLUTION INTRAVENOUS AS NEEDED
Status: DISCONTINUED | OUTPATIENT
Start: 2022-11-19 | End: 2022-11-21 | Stop reason: HOSPADM

## 2022-11-19 RX ORDER — POLYETHYLENE GLYCOL 3350 17 G/17G
17 POWDER, FOR SOLUTION ORAL DAILY PRN
Status: DISCONTINUED | OUTPATIENT
Start: 2022-11-19 | End: 2022-11-20

## 2022-11-19 RX ORDER — POTASSIUM CHLORIDE 7.45 MG/ML
10 INJECTION INTRAVENOUS
Status: DISCONTINUED | OUTPATIENT
Start: 2022-11-19 | End: 2022-11-21 | Stop reason: HOSPADM

## 2022-11-19 RX ORDER — BISACODYL 10 MG
10 SUPPOSITORY, RECTAL RECTAL DAILY PRN
Status: DISCONTINUED | OUTPATIENT
Start: 2022-11-19 | End: 2022-11-20

## 2022-11-19 RX ORDER — HYDROCODONE BITARTRATE AND ACETAMINOPHEN 5; 325 MG/1; MG/1
1 TABLET ORAL EVERY 4 HOURS PRN
Status: DISCONTINUED | OUTPATIENT
Start: 2022-11-19 | End: 2022-11-21 | Stop reason: HOSPADM

## 2022-11-19 RX ORDER — ACETAMINOPHEN 650 MG/1
650 SUPPOSITORY RECTAL EVERY 4 HOURS PRN
Status: DISCONTINUED | OUTPATIENT
Start: 2022-11-19 | End: 2022-11-20

## 2022-11-19 RX ORDER — NALOXONE HCL 0.4 MG/ML
0.4 VIAL (ML) INJECTION
Status: DISCONTINUED | OUTPATIENT
Start: 2022-11-19 | End: 2022-11-21 | Stop reason: HOSPADM

## 2022-11-19 RX ORDER — POTASSIUM CHLORIDE 750 MG/1
40 CAPSULE, EXTENDED RELEASE ORAL AS NEEDED
Status: DISCONTINUED | OUTPATIENT
Start: 2022-11-19 | End: 2022-11-21 | Stop reason: HOSPADM

## 2022-11-19 RX ORDER — BISACODYL 5 MG/1
5 TABLET, DELAYED RELEASE ORAL DAILY PRN
Status: DISCONTINUED | OUTPATIENT
Start: 2022-11-19 | End: 2022-11-20

## 2022-11-19 RX ORDER — SODIUM CHLORIDE 9 MG/ML
10 INJECTION INTRAVENOUS AS NEEDED
Status: DISCONTINUED | OUTPATIENT
Start: 2022-11-19 | End: 2022-11-21 | Stop reason: HOSPADM

## 2022-11-19 RX ORDER — SERTRALINE HYDROCHLORIDE 100 MG/1
100 TABLET, FILM COATED ORAL DAILY
Status: DISCONTINUED | OUTPATIENT
Start: 2022-11-19 | End: 2022-11-21 | Stop reason: HOSPADM

## 2022-11-19 RX ORDER — MAGNESIUM SULFATE HEPTAHYDRATE 40 MG/ML
2 INJECTION, SOLUTION INTRAVENOUS AS NEEDED
Status: DISCONTINUED | OUTPATIENT
Start: 2022-11-19 | End: 2022-11-21 | Stop reason: HOSPADM

## 2022-11-19 RX ORDER — ONDANSETRON 2 MG/ML
4 INJECTION INTRAMUSCULAR; INTRAVENOUS ONCE
Status: COMPLETED | OUTPATIENT
Start: 2022-11-19 | End: 2022-11-19

## 2022-11-19 RX ORDER — ONDANSETRON 4 MG/1
4 TABLET, FILM COATED ORAL EVERY 6 HOURS PRN
Status: DISCONTINUED | OUTPATIENT
Start: 2022-11-19 | End: 2022-11-21 | Stop reason: HOSPADM

## 2022-11-19 RX ORDER — POTASSIUM CHLORIDE 1.5 G/1.77G
40 POWDER, FOR SOLUTION ORAL AS NEEDED
Status: DISCONTINUED | OUTPATIENT
Start: 2022-11-19 | End: 2022-11-21 | Stop reason: HOSPADM

## 2022-11-19 RX ORDER — ACETAMINOPHEN 325 MG/1
650 TABLET ORAL EVERY 4 HOURS PRN
Status: DISCONTINUED | OUTPATIENT
Start: 2022-11-19 | End: 2022-11-21 | Stop reason: HOSPADM

## 2022-11-19 RX ORDER — SODIUM CHLORIDE 9 MG/ML
100 INJECTION, SOLUTION INTRAVENOUS CONTINUOUS
Status: DISCONTINUED | OUTPATIENT
Start: 2022-11-19 | End: 2022-11-21 | Stop reason: HOSPADM

## 2022-11-19 RX ORDER — ACETAMINOPHEN 160 MG/5ML
650 SOLUTION ORAL EVERY 4 HOURS PRN
Status: DISCONTINUED | OUTPATIENT
Start: 2022-11-19 | End: 2022-11-21 | Stop reason: HOSPADM

## 2022-11-19 RX ORDER — AMOXICILLIN 250 MG
2 CAPSULE ORAL 2 TIMES DAILY
Status: DISCONTINUED | OUTPATIENT
Start: 2022-11-19 | End: 2022-11-20

## 2022-11-19 RX ORDER — BUPROPION HYDROCHLORIDE 150 MG/1
150 TABLET ORAL DAILY
Status: DISCONTINUED | OUTPATIENT
Start: 2022-11-19 | End: 2022-11-21 | Stop reason: HOSPADM

## 2022-11-19 RX ORDER — SODIUM CHLORIDE 0.9 % (FLUSH) 0.9 %
10 SYRINGE (ML) INJECTION EVERY 12 HOURS SCHEDULED
Status: DISCONTINUED | OUTPATIENT
Start: 2022-11-19 | End: 2022-11-21 | Stop reason: HOSPADM

## 2022-11-19 RX ORDER — SODIUM CHLORIDE 0.9 % (FLUSH) 0.9 %
10 SYRINGE (ML) INJECTION AS NEEDED
Status: DISCONTINUED | OUTPATIENT
Start: 2022-11-19 | End: 2022-11-21 | Stop reason: HOSPADM

## 2022-11-19 RX ORDER — MORPHINE SULFATE 2 MG/ML
2 INJECTION, SOLUTION INTRAMUSCULAR; INTRAVENOUS
Status: DISCONTINUED | OUTPATIENT
Start: 2022-11-19 | End: 2022-11-21 | Stop reason: HOSPADM

## 2022-11-19 RX ORDER — SODIUM CHLORIDE 9 MG/ML
40 INJECTION, SOLUTION INTRAVENOUS AS NEEDED
Status: DISCONTINUED | OUTPATIENT
Start: 2022-11-19 | End: 2022-11-21 | Stop reason: HOSPADM

## 2022-11-19 RX ORDER — KETOROLAC TROMETHAMINE 30 MG/ML
15 INJECTION, SOLUTION INTRAMUSCULAR; INTRAVENOUS ONCE
Status: COMPLETED | OUTPATIENT
Start: 2022-11-19 | End: 2022-11-19

## 2022-11-19 RX ORDER — ENOXAPARIN SODIUM 100 MG/ML
40 INJECTION SUBCUTANEOUS DAILY
Status: DISCONTINUED | OUTPATIENT
Start: 2022-11-19 | End: 2022-11-21 | Stop reason: HOSPADM

## 2022-11-19 RX ADMIN — BUPROPION HYDROCHLORIDE 150 MG: 150 TABLET, FILM COATED, EXTENDED RELEASE ORAL at 15:32

## 2022-11-19 RX ADMIN — TAZOBACTAM SODIUM AND PIPERACILLIN SODIUM 3.38 G: 375; 3 INJECTION, SOLUTION INTRAVENOUS at 15:35

## 2022-11-19 RX ADMIN — Medication 10 ML: at 15:39

## 2022-11-19 RX ADMIN — SERTRALINE HYDROCHLORIDE 100 MG: 100 TABLET ORAL at 15:32

## 2022-11-19 RX ADMIN — TAZOBACTAM SODIUM AND PIPERACILLIN SODIUM 3.38 G: 375; 3 INJECTION, SOLUTION INTRAVENOUS at 08:30

## 2022-11-19 RX ADMIN — Medication 10 ML: at 21:06

## 2022-11-19 RX ADMIN — SODIUM CHLORIDE 100 ML/HR: 9 INJECTION, SOLUTION INTRAVENOUS at 15:33

## 2022-11-19 RX ADMIN — KETOROLAC TROMETHAMINE 15 MG: 30 INJECTION, SOLUTION INTRAMUSCULAR; INTRAVENOUS at 08:30

## 2022-11-19 RX ADMIN — ENOXAPARIN SODIUM 40 MG: 40 INJECTION SUBCUTANEOUS at 15:32

## 2022-11-19 RX ADMIN — ONDANSETRON 4 MG: 2 INJECTION INTRAMUSCULAR; INTRAVENOUS at 08:31

## 2022-11-19 NOTE — CONSULTS
General Surgery Consultation Note    Date of Service: 11/19/2022  Kelly Strickland  [id number]  1955      Referring Provider: Pancho Johnston MD    Location of Consult: Emergency room     Reason for Consultation: Sigmoid diverticulitis with microperforation       History of Present Illness:  I am seeing, Kelly Strickland, in consultation for Pancho Johnston MD  regarding sigmoid diverticulitis with microperforation.  67-year-old lady presents with sharp lower abdominal pain which began this past Thursday/Friday.  She has had pain similar to this in the past approximately 2 years ago.  She felt this was most likely diverticulitis which brought her to the emergency room.  She has bilateral lower abdominal pain associated with minimal nausea.  She has had no fevers nor chills.  She describes a decrease in her GI function though nothing significant.      Problems Addressed this Visit        Gastrointestinal Abdominal     * (Principal) Acute diverticulitis - Primary   Other Visit Diagnoses     Lower abdominal pain          Diagnoses       Codes Comments    Acute diverticulitis    -  Primary ICD-10-CM: K57.92  ICD-9-CM: 562.11     Lower abdominal pain     ICD-10-CM: R10.30  ICD-9-CM: 789.09           Past Medical History:   Diagnosis Date   • Acid reflux    • Ankle sprain    • Anxiety    • Basal cell carcinoma of skin    • Bursitis of hip    • Diverticulosis    • Fracture of ankle    • Fracture, foot    • Herpes zoster    • Hip arthrosis    • Humeral fracture    • Migraine    • Nephrolithiasis    • Osteopenia    • Tennis elbow    • Thoracic vertebral fracture (HCC)    • Vitamin D deficiency        Past Surgical History:   • BREAST BIOPSY   • CHOLECYSTECTOMY   • COLONOSCOPY   • CYSTOSCOPY W/ LASER LITHOTRIPSY   • HERNIA REPAIR   • MOHS SURGERY   • ORIF HUMERAL SHAFT FRACTURE   • [place] OPEN TREATMENT PROX HUMERAL FRACTURE    Procedure: OPEN REDUCTION INTERNAL FIXATION RIGHT HUMERUS;  Surgeon: Ryan Peters  MD Yudy;  Location: [facility];  Service: Orthopedics   • TONSILLECTOMY       No Known Allergies    Current Facility-Administered Medications on File Prior to Encounter   Medication Dose Route Frequency Provider Last Rate Last Admin   • bupivacaine PF (MARCAINE) 0.25 % injection    PRN Naveen Ruvalcaba CRNA   15 mL at 03/14/17 1200   • buprenorphine (BUPRENEX) injection    PRN Naveen Ruvalcaba CRNA   0.3 mg at 03/14/17 1200   • dexamethasone sodium phosphate injection   Peripheral Nerve PRN Naveen Ruvalcaba CRNA   2 mg at 03/14/17 1200     Current Outpatient Medications on File Prior to Encounter   Medication Sig Dispense Refill   • buPROPion XL (WELLBUTRIN XL) 150 MG 24 hr tablet Take 1 tablet by mouth Daily. 90 tablet 3   • sertraline (ZOLOFT) 100 MG tablet Take 1 tablet by mouth Daily.           Current Facility-Administered Medications:   •  Sodium Chloride (PF) 0.9 % 10 mL, 10 mL, Intravenous, PRN, Pancho Johnston MD    Current Outpatient Medications:   •  buPROPion XL (WELLBUTRIN XL) 150 MG 24 hr tablet, Take 1 tablet by mouth Daily., Disp: 90 tablet, Rfl: 3  •  sertraline (ZOLOFT) 100 MG tablet, Take 1 tablet by mouth Daily., Disp: , Rfl:     Facility-Administered Medications Ordered in Other Encounters:   •  bupivacaine PF (MARCAINE) 0.25 % injection, , , PRN, Naveen Ruvalcaba CRNA, 15 mL at 03/14/17 1200  •  buprenorphine (BUPRENEX) injection, , , PRN, Naveen Ruvalcaba CRNA, 0.3 mg at 03/14/17 1200  •  dexamethasone sodium phosphate injection, , Peripheral Nerve, PRN, Naveen Ruvalcaba CRNA, 2 mg at 03/14/17 1200    Family History   Problem Relation Age of Onset   • Stroke Mother    • Hypertension Mother    • Osteoporosis Mother    • Kidney cancer Father    • Prostate cancer Father    • Diabetes type II Father    • Breast cancer Sister 59   • Cancer Sister         Breast   • Osteoporosis Maternal Aunt    • Breast cancer Maternal Aunt    • Colon cancer Maternal Aunt    • Scoliosis Sister   "    Social History     Socioeconomic History   • Marital status:    Tobacco Use   • Smoking status: Never   • Smokeless tobacco: Never   Vaping Use   • Vaping Use: Never used   Substance and Sexual Activity   • Alcohol use: Yes     Alcohol/week: 6.0 standard drinks     Types: 6 Glasses of wine per week     Comment: occasionally   • Drug use: No   • Sexual activity: Yes     Partners: Male     Birth control/protection: None       Review of Systems:  Review of Systems   Constitutional: Positive for appetite change. Negative for fatigue.   HENT: Negative for ear pain, nosebleeds and sinus pain.    Eyes: Negative for photophobia and visual disturbance.   Respiratory: Negative for cough, chest tightness and shortness of breath.    Cardiovascular: Negative for chest pain and leg swelling.   Gastrointestinal: Positive for abdominal pain and nausea. Negative for vomiting.   Endocrine: Negative for polyphagia and polyuria.   Genitourinary: Negative for dysuria and hematuria.   Musculoskeletal: Negative for back pain and joint swelling.   Skin: Negative for pallor and rash.   Allergic/Immunologic: Negative for immunocompromised state.   Neurological: Negative for dizziness, tremors and numbness.   Hematological: Negative for adenopathy.   Psychiatric/Behavioral: Negative for agitation and decreased concentration. The patient is not nervous/anxious.      Otherwise the 12 point review of systems is negative.    /68   Pulse 102   Temp 98.4 °F (36.9 °C) (Oral)   Resp 18   Ht 165.1 cm (65\")   Wt 72.6 kg (160 lb)   SpO2 97%   BMI 26.63 kg/m²   Body mass index is 26.63 kg/m².    General: Mild distress  HEENT: PER, no icterus, normal sclerae  Cardiac: regular rhythm,  no audible rubs  Pulmonary: bilateral breath sounds, non labored  Abdominal: Tenderness to deep palpation lower abdomen worse on the right than the left.  No generalized peritonitis, no palpable hepatosplenomegaly  Neurologic: awake, alert, no obvious " focal deficits  Extremities: warm, no edema  Skin: no obvious rashes nor worrisome lesions seen     CBC  Results from last 7 days   Lab Units 11/19/22  0810   WBC 10*3/mm3 11.41*   HEMOGLOBIN g/dL 14.1   HEMATOCRIT % 42.1   PLATELETS 10*3/mm3 298       CMP  Results from last 7 days   Lab Units 11/19/22  0810   SODIUM mmol/L 138   POTASSIUM mmol/L 4.1   CHLORIDE mmol/L 101   CO2 mmol/L 26.0   BUN mg/dL 11   CREATININE mg/dL 0.76   CALCIUM mg/dL 9.1   BILIRUBIN mg/dL 2.2*   ALK PHOS U/L 153*   ALT (SGPT) U/L 29   AST (SGOT) U/L 27   GLUCOSE mg/dL 111*       Radiology  Imaging Results (Last 72 Hours)     Procedure Component Value Units Date/Time    CT Abdomen Pelvis Without Contrast [[id number]] Collected: 11/19/22 0848     Updated: 11/19/22 0859    Narrative:      DATE OF EXAM: 11/19/2022 8:39 AM     PROCEDURE: CT ABDOMEN PELVIS WO CONTRAST-     INDICATIONS: LLQ pain and fever w/ hx of diverticulitis     COMPARISON: 7/31/2019     TECHNIQUE: Routine transaxial slices were obtained through the abdomen  and pelvis without the administration of intravenous contrast.  Reconstructed coronal and sagittal images were also obtained. Automated  exposure control and iterative construction methods were used.     The radiation dose reduction device was turned on for each scan per the  ALARA (As Low as Reasonably Achievable) protocol.     FINDINGS:  There is moderate mid sigmoid diverticulitis, with inflammatory fat  stranding around the mid sigmoid seen from image 117 through image 129.  There may be one or two small bubbles of extraluminal air, difficult to  distinguish from small diverticuli, and there is no evidence of free air  or abscess. There is trace, if any intrapelvic free fluid.     In addition, there is also very subtle retroperitoneal fat stranding  extending from the area of diverticulitis cephalad along the anterior  margin of the left internal iliac artery, common iliac artery and then  left lateral margin of the  aorta, perhaps best seen adjacent the aorta  on images 73 through 78, suggesting low-level retroperitoneal  inflammation as well.     Elsewhere, minimal linear scarring is noted in the lung bases. Small  hiatal hernia is noted. Gallbladder is absent. No significant  abnormalities are seen of the spleen, pancreas, adrenal glands, or  kidneys. There is mild fatty liver change. Bowel loops are normal in  caliber. There is a minimal fat-only containing nonstrangulated  periumbilical hernia which appears stable.     Bony structures appear to be intact.        Impression:         1. Moderate sigmoid diverticulitis, with what may be a couple of very  small bubbles of extraluminal air but no free air or abscess.  2. Subtle edema/fat stranding in the retroperitoneum extending up to the  level of the infrarenal abdominal aorta, suggesting early  retroperitoneal involvement by edema/inflammation as well.     This report was finalized on 11/19/2022 8:56 AM by Dr. Carter Whitmore MD.               Assessment:  Sigmoid diverticulitis with microperforation    Plan:  N.p.o., volume resuscitation, IV antibiotics, serial abdominal exams.  I discussed surgical intervention with partial colectomy and colostomy creation with the patient.  Hopefully this can be avoided with appropriate medical management.    Roberto Moses MD  11/19/22  11:45 EST

## 2022-11-19 NOTE — Clinical Note
Level of Care: Telemetry [5]   Diagnosis: Acute diverticulitis [[id number]]   Admitting Physician: REGLA PRITCHETT II [[id number]   Attending Physician: REGLA PRITCHETT II [[id number]]

## 2022-11-19 NOTE — H&P
[facility]  HISTORY AND PHYSICAL    Patient Name: Kelly Strickland  : 1955  MRN: [id number]  Primary Care Physician: Mary Antonio DO  Date of admission: 2022      Subjective   Subjective     Chief Complaint: abd pain    HPI:  Kelly Strickland is a 67 y.o. female presenting from home with abdominal pain which started Friday am @ 0330. Located in [facility]. Woke her from sleep. Has some assoc anorexia. Has had diverticulitis in 2019 but managed as outpt at that time.    Review of Systems   Gen- No fevers, chills  CV- No chest pain, palpitations  Resp- No cough, dyspnea  GI- No N/V/D, abd pain    All other systems reviewed and are negative.     Personal History     Past Medical History:   Diagnosis Date   • Acid reflux    • Ankle sprain    • Anxiety    • Basal cell carcinoma of skin    • Bursitis of hip    • Diverticulosis    • Fracture of ankle    • Fracture, foot    • Herpes zoster    • Hip arthrosis    • Humeral fracture    • Migraine    • Nephrolithiasis    • Osteopenia    • Tennis elbow    • Thoracic vertebral fracture (HCC)    • Vitamin D deficiency              Past Surgical History:   Procedure Laterality Date   • BREAST BIOPSY Left    • CHOLECYSTECTOMY     • COLONOSCOPY     • CYSTOSCOPY W/ LASER LITHOTRIPSY     • HERNIA REPAIR     • MOHS SURGERY     • ORIF HUMERAL SHAFT FRACTURE     • MT OPEN TREATMENT PROX HUMERAL FRACTURE Right 2017    Procedure: OPEN REDUCTION INTERNAL FIXATION RIGHT HUMERUS;  Surgeon: Ryan Jimenes MD;  Location: [facility];  Service: Orthopedics   • TONSILLECTOMY         Family History:  family history includes Breast cancer in her maternal aunt; Breast cancer (age of onset: 59) in her sister; Cancer in her sister; Colon cancer in her maternal aunt; Diabetes type II in her father; Hypertension in her mother; Kidney cancer in her father; Osteoporosis in her maternal aunt and mother; Prostate cancer in her  father; Scoliosis in her sister; Stroke in her mother. Otherwise pertinent FHx was reviewed and unremarkable.     Social History:  reports that she has never smoked. She has never used smokeless tobacco. She reports current alcohol use of about 6.0 standard drinks per week. She reports that she does not use drugs.  Social History     Social History Narrative   • Not on file       Medications:  Available home medication information reviewed.  (Not in a hospital admission)      No Known Allergies    Objective   Objective     Vital Signs:   Temp:  [98.4 °F (36.9 °C)] 98.4 °F (36.9 °C)  Heart Rate:  [102] 102  Resp:  [18] 18  BP: (102-140)/(68-83) 102/68       Physical Exam   Constitutional: Awake, alert  Eyes: PERRLA, sclerae anicteric, no conjunctival injection  HENT: NCAT, mucous membranes moist  Neck: Supple, no thyromegaly, no lymphadenopathy, trachea midline  Respiratory: Clear to auscultation bilaterally, nonlabored respirations   Cardiovascular: Slightly tachy, no murmur  Gastrointestinal: Positive bowel sounds, soft, ND, mild llq ttp  Musculoskeletal: No bilateral ankle edema, no clubbing or cyanosis to extremities  Psychiatric: Appropriate affect, cooperative  Neurologic: Oriented x 3, strength symmetric in all extremities, Cranial Nerves grossly intact to confrontation, speech clear  Skin: No rashes    Result Review:  I have personally reviewed the results from the time of this admission to 11/19/2022 12:23 EST and agree with these findings:  []  Laboratory list / accordion  []  Microbiology  []  Radiology  []  EKG/Telemetry   []  Cardiology/Vascular   []  Pathology  []  Old records  []  Other:  Most notable findings include:         LAB RESULTS:      Lab 11/19/22  0810   WBC 11.41*   HEMOGLOBIN 14.1   HEMATOCRIT 42.1   PLATELETS 298   NEUTROS ABS 9.09*   IMMATURE GRANS (ABS) 0.06*   LYMPHS ABS 1.40   MONOS ABS 0.76   EOS ABS 0.08   MCV 95.0   LACTATE 1.1         Lab 11/19/22  0810   SODIUM 138   POTASSIUM  4.1   CHLORIDE 101   CO2 26.0   ANION GAP 11.0   BUN 11   CREATININE 0.76   EGFR 86.0   GLUCOSE 111*   CALCIUM 9.1         Lab 11/19/22  0810   TOTAL PROTEIN 7.4   ALBUMIN 4.30   GLOBULIN 3.1   ALT (SGPT) 29   AST (SGOT) 27   BILIRUBIN 2.2*   ALK PHOS 153*   LIPASE 20                     UA    Urinalysis 4/25/22 11/19/22 11/19/22     0810 0810   Squamous Epithelial Cells, UA   0-2   Specific Gravity, UA  1.019    Ketones, UA 2+ (A) Negative    Blood, UA  Negative    Leukocytes, UA Negative Small (1+) (A)    Nitrite, UA  Negative    RBC, UA   0-2   WBC, UA   0-2   Bacteria, UA   None Seen   (A) Abnormal value              Microbiology Results (last 10 days)     ** No results found for the last 240 hours. **          CT Abdomen Pelvis Without Contrast    Result Date: 11/19/2022  DATE OF EXAM: 11/19/2022 8:39 AM  PROCEDURE: CT ABDOMEN PELVIS WO CONTRAST-  INDICATIONS: LLQ pain and fever w/ hx of diverticulitis  COMPARISON: 7/31/2019  TECHNIQUE: Routine transaxial slices were obtained through the abdomen and pelvis without the administration of intravenous contrast. Reconstructed coronal and sagittal images were also obtained. Automated exposure control and iterative construction methods were used.  The radiation dose reduction device was turned on for each scan per the ALARA (As Low as Reasonably Achievable) protocol.  FINDINGS: There is moderate mid sigmoid diverticulitis, with inflammatory fat stranding around the mid sigmoid seen from image 117 through image 129. There may be one or two small bubbles of extraluminal air, difficult to distinguish from small diverticuli, and there is no evidence of free air or abscess. There is trace, if any intrapelvic free fluid.  In addition, there is also very subtle retroperitoneal fat stranding extending from the area of diverticulitis cephalad along the anterior margin of the left internal iliac artery, common iliac artery and then left lateral margin of the aorta, perhaps best  seen adjacent the aorta on images 73 through 78, suggesting low-level retroperitoneal inflammation as well.  Elsewhere, minimal linear scarring is noted in the lung bases. Small hiatal hernia is noted. Gallbladder is absent. No significant abnormalities are seen of the spleen, pancreas, adrenal glands, or kidneys. There is mild fatty liver change. Bowel loops are normal in caliber. There is a minimal fat-only containing nonstrangulated periumbilical hernia which appears stable.  Bony structures appear to be intact.      Impression:  1. Moderate sigmoid diverticulitis, with what may be a couple of very small bubbles of extraluminal air but no free air or abscess. 2. Subtle edema/fat stranding in the retroperitoneum extending up to the level of the infrarenal abdominal aorta, suggesting early retroperitoneal involvement by edema/inflammation as well.  This report was finalized on 11/19/2022 8:56 AM by Dr. Carter Whitmore MD.            Assessment & Plan   Assessment & Plan     Active Hospital Problems    Diagnosis  POA   • **Acute diverticulitis [K57.92]  Yes     66 y/o female presenting with LLQ pain found to have acute diverticulitis.    Acute diverticulitis with microperf  --ED has spoken with Dr. Moses who will see, appears nothing surgical to do at this time.  --NPO for now. IVF.  --IV zosyn  --Pain control.    DVT prophylaxis:  Lovenox    CODE STATUS:  CPR  Code Status and Medical Interventions:   Ordered at: 11/19/22 1125     Code Status (Patient has no pulse and is not breathing):    CPR (Attempt to Resuscitate)     Medical Interventions (Patient has pulse or is breathing):    Full Support         Deneen Márquez II, DO  11/19/22

## 2022-11-19 NOTE — ED PROVIDER NOTES
Subjective   History of Present Illness  67-year-old female presents emergency department day complaint of lower abdominal pain.  States she had diverticulitis 1 other time this seems very similar.  She woke up about 3 AM with a fever of 102 and took some antipyretics.  She reports the pain is not severe but its not improving and she wanted to get on this early rather than what it get worse.  She had no previous surgeries for diverticulitis she has had a previous hernia repair and cholecystectomy.    History provided by:  Patient   used: No    Abdominal Pain  Pain location:  LLQ  Pain quality: aching and dull    Pain radiates to:  Does not radiate  Pain severity:  Moderate  Onset quality:  Gradual  Duration:  1 day  Timing:  Constant  Progression:  Worsening  Chronicity:  New  Context comment:  History of diverticulitis this seems similar  Relieved by:  Nothing  Worsened by:  Palpation and movement  Ineffective treatments:  None tried  Associated symptoms: nausea    Associated symptoms: no anorexia, no chest pain, no constipation, no cough, no diarrhea, no dysuria, no fatigue, no fever, no hematemesis, no hematochezia, no hematuria and no vomiting    Risk factors: no alcohol abuse, no NSAID use and no recent hospitalization        Review of Systems   Constitutional: Negative for fatigue and fever.   Respiratory: Negative for cough, chest tightness and wheezing.    Cardiovascular: Negative for chest pain and palpitations.   Gastrointestinal: Positive for abdominal pain and nausea. Negative for anorexia, constipation, diarrhea, hematemesis, hematochezia and vomiting.   Genitourinary: Negative for dysuria, frequency, hematuria and urgency.   Musculoskeletal: Negative for back pain and neck pain.   Skin: Negative for pallor and rash.   Psychiatric/Behavioral: Negative.    All other systems reviewed and are negative.      Past Medical History:   Diagnosis Date   • Acid reflux    • Ankle sprain    •  Anxiety    • Basal cell carcinoma of skin    • Bursitis of hip    • Diverticulosis    • Fracture of ankle    • Fracture, foot    • Herpes zoster    • Hip arthrosis    • Humeral fracture    • Migraine    • Nephrolithiasis    • Osteopenia    • Tennis elbow    • Thoracic vertebral fracture (HCC)    • Vitamin D deficiency        No Known Allergies    Past Surgical History:   Procedure Laterality Date   • BREAST BIOPSY Left    • CHOLECYSTECTOMY     • COLONOSCOPY     • CYSTOSCOPY W/ LASER LITHOTRIPSY     • HERNIA REPAIR     • MOHS SURGERY     • ORIF HUMERAL SHAFT FRACTURE     • ME OPEN TREATMENT PROX HUMERAL FRACTURE Right 03/13/2017    Procedure: OPEN REDUCTION INTERNAL FIXATION RIGHT HUMERUS;  Surgeon: Ryan Jimenes MD;  Location: [facility];  Service: Orthopedics   • TONSILLECTOMY         Family History   Problem Relation Age of Onset   • Stroke Mother    • Hypertension Mother    • Osteoporosis Mother    • Kidney cancer Father    • Prostate cancer Father    • Diabetes type II Father    • Breast cancer Sister 59   • Cancer Sister         Breast   • Osteoporosis Maternal Aunt    • Breast cancer Maternal Aunt    • Colon cancer Maternal Aunt    • Scoliosis Sister        Social History     Socioeconomic History   • Marital status:    Tobacco Use   • Smoking status: Never   • Smokeless tobacco: Never   Vaping Use   • Vaping Use: Never used   Substance and Sexual Activity   • Alcohol use: Yes     Alcohol/week: 6.0 standard drinks     Types: 6 Glasses of wine per week     Comment: occasionally   • Drug use: No   • Sexual activity: Yes     Partners: Male     Birth control/protection: None           Objective   Physical Exam  Vitals and nursing note reviewed.   Constitutional:       Appearance: She is well-developed.   HENT:      Head: Normocephalic and atraumatic.      Nose: Nose normal.   Eyes:      General: No scleral icterus.        Right eye: No discharge.         Left eye: No discharge.       Conjunctiva/sclera: Conjunctivae normal.      Pupils: Pupils are equal, round, and reactive to light.   Neck:      Thyroid: No thyromegaly.      Vascular: No JVD.      Trachea: No tracheal deviation.   Cardiovascular:      Rate and Rhythm: Normal rate and regular rhythm.      Heart sounds: Normal heart sounds. No murmur heard.    No friction rub. No gallop.   Pulmonary:      Effort: Pulmonary effort is normal. No respiratory distress.      Breath sounds: Normal breath sounds. No stridor. No wheezing or rales.   Chest:      Chest wall: No tenderness.   Abdominal:      General: Bowel sounds are normal. There is no distension.      Palpations: Abdomen is soft. There is no mass.      Tenderness: There is abdominal tenderness in the suprapubic area and left lower quadrant. There is no right CVA tenderness, left CVA tenderness, guarding or rebound. Negative signs include Anthony's sign, Rovsing's sign, McBurney's sign, psoas sign and obturator sign.      Hernia: No hernia is present.   Musculoskeletal:         General: No tenderness or deformity. Normal range of motion.      Cervical back: Normal range of motion and neck supple.   Lymphadenopathy:      Cervical: No cervical adenopathy.   Skin:     General: Skin is warm and dry.      Coloration: Skin is not pale.      Findings: No erythema or rash.   Neurological:      Mental Status: She is alert and oriented to person, place, and time.      Cranial Nerves: No cranial nerve deficit.      Motor: No abnormal muscle tone.      Coordination: Coordination normal.      Deep Tendon Reflexes: Reflexes are normal and symmetric. Reflexes normal.   Psychiatric:         Behavior: Behavior normal. Behavior is cooperative.         Thought Content: Thought content normal.         Judgment: Judgment normal.         Procedures           ED Course  ED Course as of 11/20/22 0717   Sat Nov 19, 2022   1013 Paged Dr. Moses on call [JOSE]   1022 Spoke to Dr. Moses he had no further  recommendations at this time.  [JOSE]      ED Course User Index  [JOSE] Naveen Chairez PA           Recent Results (from the past 24 hour(s))   Comprehensive Metabolic Panel    Collection Time: 11/19/22  8:10 AM    Specimen: Blood   Result Value Ref Range    Glucose 111 (H) 65 - 99 mg/dL    BUN 11 8 - 23 mg/dL    Creatinine 0.76 0.57 - 1.00 mg/dL    Sodium 138 136 - 145 mmol/L    Potassium 4.1 3.5 - 5.2 mmol/L    Chloride 101 98 - 107 mmol/L    CO2 26.0 22.0 - 29.0 mmol/L    Calcium 9.1 8.6 - 10.5 mg/dL    Total Protein 7.4 6.0 - 8.5 g/dL    Albumin 4.30 3.50 - 5.20 g/dL    ALT (SGPT) 29 1 - 33 U/L    AST (SGOT) 27 1 - 32 U/L    Alkaline Phosphatase 153 (H) 39 - 117 U/L    Total Bilirubin 2.2 (H) 0.0 - 1.2 mg/dL    Globulin 3.1 gm/dL    A/G Ratio 1.4 g/dL    BUN/Creatinine Ratio 14.5 7.0 - 25.0    Anion Gap 11.0 5.0 - 15.0 mmol/L    eGFR 86.0 >60.0 mL/min/1.73   Lipase    Collection Time: 11/19/22  8:10 AM    Specimen: Blood   Result Value Ref Range    Lipase 20 13 - 60 U/L   Urinalysis With Microscopic If Indicated (No Culture) - Urine, Clean Catch    Collection Time: 11/19/22  8:10 AM    Specimen: Urine, Clean Catch   Result Value Ref Range    Color, UA Yellow Yellow, Straw    Appearance, UA Cloudy (A) Clear    pH, UA 5.5 5.0 - 8.0    Specific Gravity, UA 1.019 1.001 - 1.030    Glucose, UA Negative Negative    Ketones, UA Negative Negative    Bilirubin, UA Negative Negative    Blood, UA Negative Negative    Protein, UA Negative Negative    Leuk Esterase, UA Small (1+) (A) Negative    Nitrite, UA Negative Negative    Urobilinogen, UA 0.2 E.U./dL 0.2 - 1.0 E.U./dL   Lactic Acid, Plasma    Collection Time: 11/19/22  8:10 AM    Specimen: Blood   Result Value Ref Range    Lactate 1.1 0.5 - 2.0 mmol/L   Green Top (Gel)    Collection Time: 11/19/22  8:10 AM   Result Value Ref Range    Extra Tube Hold for add-ons.    Lavender Top    Collection Time: 11/19/22  8:10 AM   Result Value Ref Range    Extra Tube hold for  add-on    Gold Top - SST    Collection Time: 11/19/22  8:10 AM   Result Value Ref Range    Extra Tube Hold for add-ons.    Gray Top    Collection Time: 11/19/22  8:10 AM   Result Value Ref Range    Extra Tube Hold for add-ons.    Light Blue Top    Collection Time: 11/19/22  8:10 AM   Result Value Ref Range    Extra Tube Hold for add-ons.    CBC Auto Differential    Collection Time: 11/19/22  8:10 AM    Specimen: Blood   Result Value Ref Range    WBC 11.41 (H) 3.40 - 10.80 10*3/mm3    RBC 4.43 3.77 - 5.28 10*6/mm3    Hemoglobin 14.1 12.0 - 15.9 g/dL    Hematocrit 42.1 34.0 - 46.6 %    MCV 95.0 79.0 - 97.0 fL    MCH 31.8 26.6 - 33.0 pg    MCHC 33.5 31.5 - 35.7 g/dL    RDW 12.5 12.3 - 15.4 %    RDW-SD 43.8 37.0 - 54.0 fl    MPV 9.0 6.0 - 12.0 fL    Platelets 298 140 - 450 10*3/mm3    Neutrophil % 79.6 (H) 42.7 - 76.0 %    Lymphocyte % 12.3 (L) 19.6 - 45.3 %    Monocyte % 6.7 5.0 - 12.0 %    Eosinophil % 0.7 0.3 - 6.2 %    Basophil % 0.2 0.0 - 1.5 %    Immature Grans % 0.5 0.0 - 0.5 %    Neutrophils, Absolute 9.09 (H) 1.70 - 7.00 10*3/mm3    Lymphocytes, Absolute 1.40 0.70 - 3.10 10*3/mm3    Monocytes, Absolute 0.76 0.10 - 0.90 10*3/mm3    Eosinophils, Absolute 0.08 0.00 - 0.40 10*3/mm3    Basophils, Absolute 0.02 0.00 - 0.20 10*3/mm3    Immature Grans, Absolute 0.06 (H) 0.00 - 0.05 10*3/mm3    nRBC 0.0 0.0 - 0.2 /100 WBC   Urinalysis, Microscopic Only - Urine, Clean Catch    Collection Time: 11/19/22  8:10 AM    Specimen: Urine, Clean Catch   Result Value Ref Range    RBC, UA 0-2 None Seen, 0-2 /HPF    WBC, UA 0-2 None Seen, 0-2 /HPF    Bacteria, UA None Seen None Seen, Trace /HPF    Squamous Epithelial Cells, UA 0-2 None Seen, 0-2 /HPF    Hyaline Casts, UA 0-6 0 - 6 /LPF    Methodology Automated Microscopy      Note: In addition to lab results from this visit, the labs listed above may include labs taken at another facility or during a different encounter within the last 24 hours. Please correlate lab times with ED  admission and discharge times for further clarification of the services performed during this visit.    CT Abdomen Pelvis Without Contrast   Final Result       1. Moderate sigmoid diverticulitis, with what may be a couple of very   small bubbles of extraluminal air but no free air or abscess.   2. Subtle edema/fat stranding in the retroperitoneum extending up to the   level of the infrarenal abdominal aorta, suggesting early   retroperitoneal involvement by edema/inflammation as well.       This report was finalized on 11/19/2022 8:56 AM by Dr. Carter Whitmore MD.            Vitals:    11/20/22 0300 11/20/22 0325 11/20/22 0400 11/20/22 0500   BP:  130/63     BP Location:  Right arm     Patient Position:  Lying     Pulse: 79 75 81 85   Resp:  16     Temp:  98.6 °F (37 °C)     TempSrc:  Oral     SpO2: 95% 96% 93% 95%   Weight:       Height:         Medications   Sodium Chloride (PF) 0.9 % 10 mL (has no administration in time range)   buPROPion XL (WELLBUTRIN XL) 24 hr tablet 150 mg (150 mg Oral Given 11/19/22 1532)   sertraline (ZOLOFT) tablet 100 mg (100 mg Oral Given 11/19/22 1532)   sodium chloride 0.9 % flush 10 mL (10 mL Intravenous Given 11/19/22 2106)   sodium chloride 0.9 % flush 10 mL (has no administration in time range)   sodium chloride 0.9 % infusion 40 mL (has no administration in time range)   Enoxaparin Sodium (LOVENOX) syringe 40 mg (40 mg Subcutaneous Given 11/19/22 1532)   sodium chloride 0.9 % infusion (100 mL/hr Intravenous New Bag 11/19/22 1533)   acetaminophen (TYLENOL) tablet 650 mg (has no administration in time range)     Or   acetaminophen (TYLENOL) 160 MG/5ML solution 650 mg (has no administration in time range)     Or   acetaminophen (TYLENOL) suppository 650 mg (has no administration in time range)   HYDROcodone-acetaminophen (NORCO) 5-325 MG per tablet 1 tablet (has no administration in time range)   morphine injection 2 mg (has no administration in time range)     And   naloxone (NARCAN)  injection 0.4 mg (has no administration in time range)   potassium chloride (MICRO-K) CR capsule 40 mEq (has no administration in time range)     Or   potassium chloride (KLOR-CON) packet 40 mEq (has no administration in time range)     Or   potassium chloride 10 mEq in 100 mL IVPB (has no administration in time range)   Magnesium Sulfate 2 gram Bolus, followed by 8 gram infusion (total Mg dose 10 grams)- Mg less than or equal to 1mg/dL (has no administration in time range)     Or   Magnesium Sulfate 2 gram / 50mL Infusion (GIVE X 3 BAGS TO EQUAL 6GM TOTAL DOSE) - Mg 1.1 - 1.5 mg/dl (has no administration in time range)     Or   Magnesium Sulfate 4 gram infusion- Mg 1.6-1.9 mg/dL (has no administration in time range)   sennosides-docusate (PERICOLACE) 8.6-50 MG per tablet 2 tablet (2 tablets Oral Not Given 11/19/22 2046)     And   polyethylene glycol (MIRALAX) packet 17 g (has no administration in time range)     And   bisacodyl (DULCOLAX) EC tablet 5 mg (has no administration in time range)     And   bisacodyl (DULCOLAX) suppository 10 mg (has no administration in time range)   ondansetron (ZOFRAN) tablet 4 mg (has no administration in time range)     Or   ondansetron (ZOFRAN) injection 4 mg (has no administration in time range)   piperacillin-tazobactam (ZOSYN) 3.375 g in iso-osmotic dextrose 50 ml (premix) (3.375 g Intravenous New Bag 11/20/22 0001)   piperacillin-tazobactam (ZOSYN) 3.375 g in iso-osmotic dextrose 50 ml (premix) (0 g Intravenous Stopped 11/19/22 0845)   ketorolac (TORADOL) injection 15 mg (15 mg Intravenous Given 11/19/22 0830)   ondansetron (ZOFRAN) injection 4 mg (4 mg Intravenous Given 11/19/22 0831)     ECG/EMG Results (last 24 hours)     ** No results found for the last 24 hours. **        No orders to display                                       MDM  Number of Diagnoses or Management Options  Acute diverticulitis: new and requires workup  Lower abdominal pain: new and requires workup      Amount and/or Complexity of Data Reviewed  Clinical lab tests: reviewed and ordered  Tests in the radiology section of CPT®: reviewed and ordered  Tests in the medicine section of CPT®: ordered and reviewed  Discuss the patient with other providers: yes    Patient Progress  Patient progress: stable      Final diagnoses:   Acute diverticulitis   Lower abdominal pain       ED Disposition  ED Disposition     ED Disposition   Decision to Admit    Condition   --    Comment   Level of Care: Telemetry [5]   Diagnosis: Acute diverticulitis [2814259]   Admitting Physician: REGLA PRITCHETT II [[id number]]   Attending Physician: REGLA PRITCHETT II [[id number]]   Certification: I Certify That Inpatient Hospital Services Are Medically Necessary For Greater Than 2 Midnights               No follow-up provider specified.       Medication List      No changes were made to your prescriptions during this visit.          Naveen Chairez PA  11/20/22 0717

## 2022-11-19 NOTE — CASE MANAGEMENT/SOCIAL WORK
Continued Stay Note  [facility]     Patient Name: Kelly Strickland  MRN: [id number]  Today's Date: 11/19/2022    Admit Date: 11/19/2022    Plan: IDP   Discharge Plan     Row Name 11/19/22 1208       Plan    Plan IDP    Plan Comments MSW met with Pt at bedside to complete IDP. Pt lives with  in [place]. Pt confirmed demographics and reports PCP is Mary Antonio. Pt has [facility] and Medicare. Pt is independent with ADL’s. Pt reports no DME, HH, or O2. Pt’s preferred pharmacy is [facility] on [address] Pt’s  able to transport when medically ready. Pt denies any d/c needs at this time. CM will continue to follow.    Final Discharge Disposition Code 30 - still a patient               Discharge Codes    No documentation.                     TONI Hobson

## 2022-11-20 LAB
ANION GAP SERPL CALCULATED.3IONS-SCNC: 13 MMOL/L (ref 5–15)
BUN SERPL-MCNC: 14 MG/DL (ref 8–23)
BUN/CREAT SERPL: 19.7 (ref 7–25)
CALCIUM SPEC-SCNC: 8.8 MG/DL (ref 8.6–10.5)
CHLORIDE SERPL-SCNC: 103 MMOL/L (ref 98–107)
CO2 SERPL-SCNC: 22 MMOL/L (ref 22–29)
CREAT SERPL-MCNC: 0.71 MG/DL (ref 0.57–1)
DEPRECATED RDW RBC AUTO: 42.7 FL (ref 37–54)
EGFRCR SERPLBLD CKD-EPI 2021: 93.3 ML/MIN/1.73
ERYTHROCYTE [DISTWIDTH] IN BLOOD BY AUTOMATED COUNT: 12.1 % (ref 12.3–15.4)
GLUCOSE SERPL-MCNC: 63 MG/DL (ref 65–99)
HCT VFR BLD AUTO: 39 % (ref 34–46.6)
HGB BLD-MCNC: 12.8 G/DL (ref 12–15.9)
MCH RBC QN AUTO: 31.3 PG (ref 26.6–33)
MCHC RBC AUTO-ENTMCNC: 32.8 G/DL (ref 31.5–35.7)
MCV RBC AUTO: 95.4 FL (ref 79–97)
PLATELET # BLD AUTO: 234 10*3/MM3 (ref 140–450)
PMV BLD AUTO: 9.4 FL (ref 6–12)
POTASSIUM SERPL-SCNC: 4.2 MMOL/L (ref 3.5–5.2)
RBC # BLD AUTO: 4.09 10*6/MM3 (ref 3.77–5.28)
SODIUM SERPL-SCNC: 138 MMOL/L (ref 136–145)
WBC NRBC COR # BLD: 7.07 10*3/MM3 (ref 3.4–10.8)

## 2022-11-20 PROCEDURE — 25010000002 ERTAPENEM PER 500 MG: Performed by: NURSE PRACTITIONER

## 2022-11-20 PROCEDURE — 25010000002 ENOXAPARIN PER 10 MG: Performed by: INTERNAL MEDICINE

## 2022-11-20 PROCEDURE — 25010000002 PIPERACILLIN SOD-TAZOBACTAM PER 1 G: Performed by: INTERNAL MEDICINE

## 2022-11-20 PROCEDURE — 85027 COMPLETE CBC AUTOMATED: CPT | Performed by: INTERNAL MEDICINE

## 2022-11-20 PROCEDURE — 96372 THER/PROPH/DIAG INJ SC/IM: CPT

## 2022-11-20 PROCEDURE — 80048 BASIC METABOLIC PNL TOTAL CA: CPT | Performed by: INTERNAL MEDICINE

## 2022-11-20 PROCEDURE — 99232 SBSQ HOSP IP/OBS MODERATE 35: CPT | Performed by: INTERNAL MEDICINE

## 2022-11-20 RX ORDER — POLYETHYLENE GLYCOL 3350 17 G/17G
17 POWDER, FOR SOLUTION ORAL DAILY
Status: DISCONTINUED | OUTPATIENT
Start: 2022-11-20 | End: 2022-11-21 | Stop reason: HOSPADM

## 2022-11-20 RX ORDER — FAMOTIDINE 20 MG/1
20 TABLET, FILM COATED ORAL
Status: DISCONTINUED | OUTPATIENT
Start: 2022-11-20 | End: 2022-11-21 | Stop reason: HOSPADM

## 2022-11-20 RX ORDER — MELOXICAM 7.5 MG/1
7.5 TABLET ORAL DAILY
Status: DISCONTINUED | OUTPATIENT
Start: 2022-11-20 | End: 2022-11-21 | Stop reason: HOSPADM

## 2022-11-20 RX ADMIN — TAZOBACTAM SODIUM AND PIPERACILLIN SODIUM 3.38 G: 375; 3 INJECTION, SOLUTION INTRAVENOUS at 08:09

## 2022-11-20 RX ADMIN — BUPROPION HYDROCHLORIDE 150 MG: 150 TABLET, FILM COATED, EXTENDED RELEASE ORAL at 08:09

## 2022-11-20 RX ADMIN — FAMOTIDINE 20 MG: 20 TABLET ORAL at 17:02

## 2022-11-20 RX ADMIN — POLYETHYLENE GLYCOL 3350 17 G: 17 POWDER, FOR SOLUTION ORAL at 11:58

## 2022-11-20 RX ADMIN — Medication 10 ML: at 08:10

## 2022-11-20 RX ADMIN — SERTRALINE HYDROCHLORIDE 100 MG: 100 TABLET ORAL at 08:09

## 2022-11-20 RX ADMIN — ERTAPENEM 1 G: 1 INJECTION INTRAMUSCULAR; INTRAVENOUS at 11:59

## 2022-11-20 RX ADMIN — MELOXICAM 7.5 MG: 7.5 TABLET ORAL at 11:58

## 2022-11-20 RX ADMIN — TAZOBACTAM SODIUM AND PIPERACILLIN SODIUM 3.38 G: 375; 3 INJECTION, SOLUTION INTRAVENOUS at 00:01

## 2022-11-20 RX ADMIN — ENOXAPARIN SODIUM 40 MG: 40 INJECTION SUBCUTANEOUS at 08:09

## 2022-11-20 RX ADMIN — Medication 10 ML: at 22:08

## 2022-11-20 NOTE — PROGRESS NOTES
"General Surgery Daily Progress Note    Subjective:  Feeling better.  Tolerating clear liquids.    Objective:  /71 (BP Location: Right arm, Patient Position: Lying)   Pulse 88   Temp 98.4 °F (36.9 °C) (Oral)   Resp 18   Ht 165.1 cm (65\")   Wt 74.5 kg (164 lb 4.8 oz)   SpO2 97%   BMI 27.34 kg/m²       General Appearance: Minimal distress  Eyes: Anicteric  Neck: Trachea midline   Cardiovascular:  RRR without murmur nor rub  Lungs:  Bilateral respirations unlabored   Abdomen:  Soft, tender lower abdomen worse on the right, no generalized peritonitis  Extremities:  No cyanosis or edema   Skin:  No obvious rashes   Neurologic: awake and conversant       Imaging Results (Last 24 Hours)     ** No results found for the last 24 hours. **          CBC:  Results from last 7 days   Lab Units 11/20/22  0818   WBC 10*3/mm3 7.07   HEMOGLOBIN g/dL 12.8   HEMATOCRIT % 39.0   PLATELETS 10*3/mm3 234       CMP:  Results from last 7 days   Lab Units 11/20/22  0818 11/19/22  0810   SODIUM mmol/L 138 138   POTASSIUM mmol/L 4.2 4.1   CHLORIDE mmol/L 103 101   CO2 mmol/L 22.0 26.0   BUN mg/dL 14 11   CREATININE mg/dL 0.71 0.76   CALCIUM mg/dL 8.8 9.1   BILIRUBIN mg/dL  --  2.2*   ALK PHOS U/L  --  153*   ALT (SGPT) U/L  --  29   AST (SGOT) U/L  --  27   GLUCOSE mg/dL 63* 111*         Assessment:  Microperforation, sigmoid diverticulitis    Plan:   Will advance to full liquids.  Consult infectious disease for antibiotic coverage.    Roberto Moses MD - 11/20/2022, 10:32 EST        "

## 2022-11-20 NOTE — PLAN OF CARE
Problem: Adult Inpatient Plan of Care  Goal: Plan of Care Review  Outcome: Ongoing, Progressing  Goal: Optimal Comfort and Wellbeing  Outcome: Ongoing, Progressing  Intervention: Provide Person-Centered Care  Description: Use a family-focused approach to care.  Develop trust and rapport by proactively providing information, encouraging questions, addressing concerns and offering reassurance.  Acknowledge emotional response to hospitalization.  Recognize and utilize personal coping strategies.  Honor spiritual and cultural preferences.     Problem: Infection  Goal: Absence of Infection Signs and Symptoms  Outcome: Ongoing, Progressing   Goal Outcome Evaluation:  Plan of Care Reviewed With: patient        Progress: improving

## 2022-11-20 NOTE — PROGRESS NOTES
[facility] Medicine Services  PROGRESS NOTE    Patient Name: Kelly Strickland  : 1955  MRN: [id number]    Date of Admission: 2022  Primary Care Physician: Mary Antonio DO    Subjective   Subjective     CC: f/u diverticulitis    HPI: Up in bed. Pain free. Has not had pain medication since yesterday.    ROS:  Gen- No fevers, chills  CV- No chest pain, palpitations  Resp- No cough, dyspnea  GI- No N/V/D, abd pain     Objective   Objective     Vital Signs:   Temp:  [98.4 °F (36.9 °C)-98.8 °F (37.1 °C)] 98.4 °F (36.9 °C)  Heart Rate:  [] 92  Resp:  [16-18] 18  BP: (112-145)/(63-74) 145/71     Physical Exam:  Constitutional: No acute distress, awake, alert  HENT: NCAT, mucous membranes moist  Respiratory: Clear to auscultation bilaterally, respiratory effort normal   Cardiovascular: RRR, no murmurs, rubs, or gallops  Gastrointestinal: Positive bowel sounds, soft, nontender, nondistended  Musculoskeletal: No bilateral ankle edema  Psychiatric: Appropriate affect, cooperative  Neurologic: Oriented x 3, strength symmetric in all extremities, Cranial Nerves grossly intact to confrontation, speech clear  Skin: No rashes    Results Reviewed:  LAB RESULTS:      Lab 22  0818 22  0810   WBC 7.07 11.41*   HEMOGLOBIN 12.8 14.1   HEMATOCRIT 39.0 42.1   PLATELETS 234 298   NEUTROS ABS  --  9.09*   IMMATURE GRANS (ABS)  --  0.06*   LYMPHS ABS  --  1.40   MONOS ABS  --  0.76   EOS ABS  --  0.08   MCV 95.4 95.0   LACTATE  --  1.1         Lab 22  0818 22  0810   SODIUM 138 138   POTASSIUM 4.2 4.1   CHLORIDE 103 101   CO2 22.0 26.0   ANION GAP 13.0 11.0   BUN 14 11   CREATININE 0.71 0.76   EGFR 93.3 86.0   GLUCOSE 63* 111*   CALCIUM 8.8 9.1         Lab 22  0810   TOTAL PROTEIN 7.4   ALBUMIN 4.30   GLOBULIN 3.1   ALT (SGPT) 29   AST (SGOT) 27   BILIRUBIN 2.2*   ALK PHOS 153*   LIPASE 20                     Brief Urine Lab Results  (Last result in the past  365 days)      Color   Clarity   Blood   Leuk Est   Nitrite   Protein   CREAT   Urine HCG        11/19/22 0810 Yellow   Cloudy   Negative   Small (1+)   Negative   Negative                 Microbiology Results Abnormal     Procedure Component Value - Date/Time    Blood Culture - Blood, Arm, Left [[id number]]  (Normal) Collected: 11/19/22 0807    Lab Status: Preliminary result Specimen: Blood from Arm, Left Updated: 11/20/22 0846     Blood Culture No growth at 24 hours    Blood Culture - Blood, Arm, Right [[id number]]  (Normal) Collected: 11/19/22 0820    Lab Status: Preliminary result Specimen: Blood from Arm, Right Updated: 11/20/22 0846     Blood Culture No growth at 24 hours          CT Abdomen Pelvis Without Contrast    Result Date: 11/19/2022  DATE OF EXAM: 11/19/2022 8:39 AM  PROCEDURE: CT ABDOMEN PELVIS WO CONTRAST-  INDICATIONS: LLQ pain and fever w/ hx of diverticulitis  COMPARISON: 7/31/2019  TECHNIQUE: Routine transaxial slices were obtained through the abdomen and pelvis without the administration of intravenous contrast. Reconstructed coronal and sagittal images were also obtained. Automated exposure control and iterative construction methods were used.  The radiation dose reduction device was turned on for each scan per the ALARA (As Low as Reasonably Achievable) protocol.  FINDINGS: There is moderate mid sigmoid diverticulitis, with inflammatory fat stranding around the mid sigmoid seen from image 117 through image 129. There may be one or two small bubbles of extraluminal air, difficult to distinguish from small diverticuli, and there is no evidence of free air or abscess. There is trace, if any intrapelvic free fluid.  In addition, there is also very subtle retroperitoneal fat stranding extending from the area of diverticulitis cephalad along the anterior margin of the left internal iliac artery, common iliac artery and then left lateral margin of the aorta, perhaps best seen adjacent the aorta on  images 73 through 78, suggesting low-level retroperitoneal inflammation as well.  Elsewhere, minimal linear scarring is noted in the lung bases. Small hiatal hernia is noted. Gallbladder is absent. No significant abnormalities are seen of the spleen, pancreas, adrenal glands, or kidneys. There is mild fatty liver change. Bowel loops are normal in caliber. There is a minimal fat-only containing nonstrangulated periumbilical hernia which appears stable.  Bony structures appear to be intact.      Impression:  1. Moderate sigmoid diverticulitis, with what may be a couple of very small bubbles of extraluminal air but no free air or abscess. 2. Subtle edema/fat stranding in the retroperitoneum extending up to the level of the infrarenal abdominal aorta, suggesting early retroperitoneal involvement by edema/inflammation as well.  This report was finalized on 11/19/2022 8:56 AM by Dr. Carter Whitmore MD.            I have reviewed the medications:  Scheduled Meds:buPROPion XL, 150 mg, Oral, Daily  enoxaparin, 40 mg, Subcutaneous, Daily  famotidine, 20 mg, Oral, BID AC  meloxicam, 7.5 mg, Oral, Daily  piperacillin-tazobactam, 3.375 g, Intravenous, Q8H  polyethylene glycol, 17 g, Oral, Daily  sertraline, 100 mg, Oral, Daily  sodium chloride, 10 mL, Intravenous, Q12H      Continuous Infusions:sodium chloride, 100 mL/hr, Last Rate: 100 mL/hr (11/19/22 1533)      PRN Meds:.•  acetaminophen **OR** acetaminophen **OR** [DISCONTINUED] acetaminophen  •  HYDROcodone-acetaminophen  •  magnesium sulfate **OR** magnesium sulfate **OR** magnesium sulfate  •  Morphine **AND** naloxone  •  ondansetron **OR** ondansetron  •  potassium chloride **OR** potassium chloride **OR** potassium chloride  •  Sodium Chloride (PF)  •  sodium chloride  •  sodium chloride    Assessment & Plan   Assessment & Plan     Active Hospital Problems    Diagnosis  POA   • **Acute diverticulitis [K57.92]  Yes      Resolved Hospital Problems   No resolved problems to  display.        Brief Hospital Course to date:  68 y/o female presenting with LLQ pain found to have acute diverticulitis.     Acute diverticulitis with microperf  --Dr. Moses follows, no surgical intervention at this time.  --ID consult.  --Adv diet.  --Continue IV zosyn  --Pain control.    Expected Discharge Location and Transportation: home  Expected Discharge Date: 11/21    DVT prophylaxis:  Medical DVT prophylaxis orders are present.     AM-PAC 6 Clicks Score (PT): 24 (11/20/22 0810)    CODE STATUS:   Code Status and Medical Interventions:   Ordered at: 11/19/22 1125     Code Status (Patient has no pulse and is not breathing):    CPR (Attempt to Resuscitate)     Medical Interventions (Patient has pulse or is breathing):    Full Support       Deneen Márquez II, DO  11/20/22

## 2022-11-20 NOTE — CONSULTS
INFECTIOUS DISEASE CONSULT/INITIAL HOSPITAL VISIT    Kelly Strickland  1955  [id number]    Date of Consult: 11/20/2022    Admission Date: 11/19/2022      Requesting Provider: No ref. provider found  Evaluating Physician: Bony Villarreal MD    Reason for Consultation: Perforated sigmoid diverticulitis     History of present illness:    Patient is a 67 y.o. female, with PMH GERD, seen today for perforated sigmoid diverticulitis.  She presented to the ED with complaints of LLQ pain, with fever up to 102 since Friday.  Ct revealing moderate sigmoid diverticulitis, with very small bubbles of extraluminal air, but no free air or abscess. Labs with WBC 11, lactate 1.1, UA unremarkable, ALT 29, AST 27, Scr 0.76, and blood cultures negative to date. Currently on Zosyn and we were consulted for evaluation and treatment. She has had one previous episode of diverticulitis in the past. Colonoscopy approximately 2 years ago.      Past Medical History:   Diagnosis Date   • Acid reflux    • Ankle sprain    • Anxiety    • Basal cell carcinoma of skin    • Bursitis of hip    • Diverticulosis    • Fracture of ankle    • Fracture, foot    • Herpes zoster    • Hip arthrosis    • Humeral fracture    • Migraine    • Nephrolithiasis    • Osteopenia    • Tennis elbow    • Thoracic vertebral fracture (HCC)    • Vitamin D deficiency        Past Surgical History:   Procedure Laterality Date   • BREAST BIOPSY Left    • CHOLECYSTECTOMY     • COLONOSCOPY     • CYSTOSCOPY W/ LASER LITHOTRIPSY     • HERNIA REPAIR     • MOHS SURGERY     • ORIF HUMERAL SHAFT FRACTURE     • AR OPEN TREATMENT PROX HUMERAL FRACTURE Right 03/13/2017    Procedure: OPEN REDUCTION INTERNAL FIXATION RIGHT HUMERUS;  Surgeon: Ryan Jimenes MD;  Location: [facility];  Service: Orthopedics   • TONSILLECTOMY         Family History   Problem Relation Age of Onset   • Stroke Mother    • Hypertension Mother    • Osteoporosis Mother    • Kidney cancer Father    •  Prostate cancer Father    • Diabetes type II Father    • Breast cancer Sister 59   • Cancer Sister         Breast   • Osteoporosis Maternal Aunt    • Breast cancer Maternal Aunt    • Colon cancer Maternal Aunt    • Scoliosis Sister        Social History     Socioeconomic History   • Marital status:    Tobacco Use   • Smoking status: Never   • Smokeless tobacco: Never   Vaping Use   • Vaping Use: Never used   Substance and Sexual Activity   • Alcohol use: Yes     Alcohol/week: 6.0 standard drinks     Types: 6 Glasses of wine per week     Comment: occasionally   • Drug use: No   • Sexual activity: Yes     Partners: Male     Birth control/protection: None       No Known Allergies      Medication:    Current Facility-Administered Medications:   •  acetaminophen (TYLENOL) tablet 650 mg, 650 mg, Oral, Q4H PRN **OR** acetaminophen (TYLENOL) 160 MG/5ML solution 650 mg, 650 mg, Oral, Q4H PRN **OR** [DISCONTINUED] acetaminophen (TYLENOL) suppository 650 mg, 650 mg, Rectal, Q4H PRN, Deneen Márquez II, DO  •  buPROPion XL (WELLBUTRIN XL) 24 hr tablet 150 mg, 150 mg, Oral, Daily, Deneen Márquez II, DO, 150 mg at 11/20/22 0809  •  Enoxaparin Sodium (LOVENOX) syringe 40 mg, 40 mg, Subcutaneous, Daily, Deneen Márquez II, DO, 40 mg at 11/20/22 0809  •  ertapenem (INVanz) 1 g/100 mL 0.9% NS VTB (mbp), 1 g, Intravenous, Q24H, Shahnaz aAron APRN, 1 g at 11/20/22 1159  •  famotidine (PEPCID) tablet 20 mg, 20 mg, Oral, BID AC, Roberto Moses MD, 20 mg at 11/20/22 1702  •  HYDROcodone-acetaminophen (NORCO) 5-325 MG per tablet 1 tablet, 1 tablet, Oral, Q4H PRN, Deneen Márquez II, DO  •  Magnesium Sulfate 2 gram Bolus, followed by 8 gram infusion (total Mg dose 10 grams)- Mg less than or equal to 1mg/dL, 2 g, Intravenous, PRN **OR** Magnesium Sulfate 2 gram / 50mL Infusion (GIVE X 3 BAGS TO EQUAL 6GM TOTAL DOSE) - Mg 1.1 - 1.5 mg/dl, 2 g, Intravenous, PRN **OR** Magnesium Sulfate 4 gram infusion- Mg 1.6-1.9  mg/dL, 4 g, Intravenous, PRN, Willi, Deneen M II, DO  •  meloxicam (MOBIC) tablet 7.5 mg, 7.5 mg, Oral, Daily, Roberto Moses MD, 7.5 mg at 11/20/22 1158  •  morphine injection 2 mg, 2 mg, Intravenous, Q2H PRN **AND** naloxone (NARCAN) injection 0.4 mg, 0.4 mg, Intravenous, Q5 Min PRN, Willi, Deneen M II, DO  •  ondansetron (ZOFRAN) tablet 4 mg, 4 mg, Oral, Q6H PRN **OR** ondansetron (ZOFRAN) injection 4 mg, 4 mg, Intravenous, Q6H PRN, Willi, Deneen M II, DO  •  polyethylene glycol (MIRALAX) packet 17 g, 17 g, Oral, Daily, Roberto Moses MD, 17 g at 11/20/22 1158  •  potassium chloride (MICRO-K) CR capsule 40 mEq, 40 mEq, Oral, PRN **OR** potassium chloride (KLOR-CON) packet 40 mEq, 40 mEq, Oral, PRN **OR** potassium chloride 10 mEq in 100 mL IVPB, 10 mEq, Intravenous, Q1H PRN, Willi, Deneen M II, DO  •  sertraline (ZOLOFT) tablet 100 mg, 100 mg, Oral, Daily, Willi, Deneen M II, DO, 100 mg at 11/20/22 0809  •  Sodium Chloride (PF) 0.9 % 10 mL, 10 mL, Intravenous, PRN, Pancho Johnston MD  •  sodium chloride 0.9 % flush 10 mL, 10 mL, Intravenous, Q12H, Willi, Deneen M II, DO, 10 mL at 11/20/22 0810  •  sodium chloride 0.9 % flush 10 mL, 10 mL, Intravenous, PRN, Willi, Deneen M II, DO  •  sodium chloride 0.9 % infusion 40 mL, 40 mL, Intravenous, PRN, Willi, Deneen M II, DO  •  sodium chloride 0.9 % infusion, 100 mL/hr, Intravenous, Continuous, Willi, Deneen M II, DO, Last Rate: 100 mL/hr at 11/19/22 1533, 100 mL/hr at 11/19/22 1533    Facility-Administered Medications Ordered in Other Encounters:   •  bupivacaine PF (MARCAINE) 0.25 % injection, , , PRN, Naveen Ruvalcaba CRNA, 15 mL at 03/14/17 1200  •  buprenorphine (BUPRENEX) injection, , , PRN, Naveen Ruvalcaba CRNA, 0.3 mg at 03/14/17 1200  •  dexamethasone sodium phosphate injection, , Peripheral Nerve, PRN, Naveen Ruvalcaba CRNA, 2 mg at 03/14/17 1200    Antibiotics:  Anti-Infectives (From admission, onward)    Ordered      Dose/Rate Route Frequency Start Stop    22 1119  ertapenem (INVanz) 1 g/100 mL 0.9% NS VTB (mbp)        Ordering Provider: Shahnaz Aaron APRN    1 g  over 30 Minutes Intravenous Every 24 Hours 22 1200 22 1159    22 0756  piperacillin-tazobactam (ZOSYN) 3.375 g in iso-osmotic dextrose 50 ml (premix)        Ordering Provider: Pancho Johnston MD    3.375 g Intravenous Once 22 0758 22 0845            Review of Systems:  Constitutional-- + Fever, chills or sweats.  Appetite good, and no malaise+ fatigue.  HEENT-- No new vision, hearing or throat complaints.  No epistaxis or oral sores.  Denies odynophagia or dysphagia. No headache, photophobia or neck stiffness.  CV-- No chest pain, palpitation or syncope  Resp-- No SOB/cough/Hemoptysis  GI- + nausea, vomiting, no  diarrhea.  No hematochezia, melena, or hematemesis. Denies jaundice or chronic liver disease. LLQ pain  -- No dysuria, hematuria, or flank pain.  Denies hesitancy, urgency or flank pain.  Heme- No active bruising or bleeding;.  MS-- no swelling or pain in the bones or joints of arms/legs.  No new back pain.  Neuro-- No acute focal weakness or numbness in the arms or legs.  No seizures.  Skin--No rashes or lesions      Physical Exam:   Vital Signs  Temp (24hrs), Av.5 °F (36.9 °C), Min:98.1 °F (36.7 °C), Max:98.7 °F (37.1 °C)    Temp  Min: 98.1 °F (36.7 °C)  Max: 98.7 °F (37.1 °C)  BP  Min: 112/74  Max: 145/71  Pulse  Min: 75  Max: 92  Resp  Min: 16  Max: 18  SpO2  Min: 91 %  Max: 97 %    GENERAL: Awake and alert, in no acute distress.   HEENT: Normocephalic, atraumatic.  PERRL. EOMI. No conjunctival injection. No icterus. Oropharynx clear without evidence of thrush or exudate. No evidence of peridontal disease.    NECK: Supple without nuchal rigidity. No mass.  HEART: RRR; No murmur, rubs, gallops.   LUNGS: Clear to auscultation bilaterally without wheezing, rales, rhonchi. Normal respiratory effort.  .  ABDOMEN: Soft, mild lower quadrant tenderness, nondistended. Positive bowel sounds. No rebound or guarding.  EXT:  No cyanosis, clubbing or edema. No cord.  :  Without Little catheter.  MSK: No joint effusions or erythema  SKIN: Warm and dry without cutaneous eruptions on Inspection/palpation.    NEURO: Oriented to PPT.  Motor 5/5 strength  PSYCHIATRIC: Normal insight and judgment. Cooperative with PE    Laboratory Data    Results from last 7 days   Lab Units 11/20/22  0818 11/19/22  0810   WBC 10*3/mm3 7.07 11.41*   HEMOGLOBIN g/dL 12.8 14.1   HEMATOCRIT % 39.0 42.1   PLATELETS 10*3/mm3 234 298     Results from last 7 days   Lab Units 11/20/22  0818   SODIUM mmol/L 138   POTASSIUM mmol/L 4.2   CHLORIDE mmol/L 103   CO2 mmol/L 22.0   BUN mg/dL 14   CREATININE mg/dL 0.71   GLUCOSE mg/dL 63*   CALCIUM mg/dL 8.8     Results from last 7 days   Lab Units 11/19/22  0810   ALK PHOS U/L 153*   BILIRUBIN mg/dL 2.2*   ALT (SGPT) U/L 29   AST (SGOT) U/L 27             Results from last 7 days   Lab Units 11/19/22  0810   LACTATE mmol/L 1.1             Estimated Creatinine Clearance: 77.7 mL/min (by C-G formula based on SCr of 0.71 mg/dL).      Microbiology:  Blood Culture   Date Value Ref Range Status   11/19/2022 No growth at 24 hours  Preliminary     No results found for: BCIDPCR, CXREFLEX, CSFCX, CULTURETIS  No results found for: CULTURES, HSVCX, URCX  No results found for: EYECULTURE, GCCX, HSVCULTURE, LABHSV  No results found for: LEGIONELLA, MRSACX, MUMPSCX, MYCOPLASCX  No results found for: NOCARDIACX, STOOLCX  No results found for: THROATCX, UNSTIMCULT, URINECX, CULTURE, VZVCULTUR  No results found for: VIRALCULTU, WOUNDCX        Radiology:  Imaging Results (Last 72 Hours)     Procedure Component Value Units Date/Time    CT Abdomen Pelvis Without Contrast [[id number]] Collected: 11/19/22 0848     Updated: 11/19/22 0859    Narrative:      DATE OF EXAM: 11/19/2022 8:39 AM     PROCEDURE: CT ABDOMEN PELVIS WO  CONTRAST-     INDICATIONS: LLQ pain and fever w/ hx of diverticulitis     COMPARISON: 7/31/2019     TECHNIQUE: Routine transaxial slices were obtained through the abdomen  and pelvis without the administration of intravenous contrast.  Reconstructed coronal and sagittal images were also obtained. Automated  exposure control and iterative construction methods were used.     The radiation dose reduction device was turned on for each scan per the  ALARA (As Low as Reasonably Achievable) protocol.     FINDINGS:  There is moderate mid sigmoid diverticulitis, with inflammatory fat  stranding around the mid sigmoid seen from image 117 through image 129.  There may be one or two small bubbles of extraluminal air, difficult to  distinguish from small diverticuli, and there is no evidence of free air  or abscess. There is trace, if any intrapelvic free fluid.     In addition, there is also very subtle retroperitoneal fat stranding  extending from the area of diverticulitis cephalad along the anterior  margin of the left internal iliac artery, common iliac artery and then  left lateral margin of the aorta, perhaps best seen adjacent the aorta  on images 73 through 78, suggesting low-level retroperitoneal  inflammation as well.     Elsewhere, minimal linear scarring is noted in the lung bases. Small  hiatal hernia is noted. Gallbladder is absent. No significant  abnormalities are seen of the spleen, pancreas, adrenal glands, or  kidneys. There is mild fatty liver change. Bowel loops are normal in  caliber. There is a minimal fat-only containing nonstrangulated  periumbilical hernia which appears stable.     Bony structures appear to be intact.        Impression:         1. Moderate sigmoid diverticulitis, with what may be a couple of very  small bubbles of extraluminal air but no free air or abscess.  2. Subtle edema/fat stranding in the retroperitoneum extending up to the  level of the infrarenal abdominal aorta, suggesting  early  retroperitoneal involvement by edema/inflammation as well.     This report was finalized on 2022 8:56 AM by Dr. Carter Whitmore MD.         I personally read her radiographic images and reviewed the images with the patient herself.      Impression:   1.  Perforated sigmoid diverticulitis-She will require approximately 2 weeks of intravenous antibiotic therapy, possibly followed by oral antibiotic therapy.  Prior to cessation of intravenous antibiotic therapy, we will need to obtain a follow-up CT scan.  I will plan to switch her from Zosyn to Invanz.  She may be able to discharge tomorrow with acute outpatient care in our office starting on Tuesday.  2.  Leukocytosis/neutrophilia-secondary to diverticulitis  3.  Hyperbilirubinemia, 2.2         PLAN/RECOMMENDATIONS:   Thank you for asking us to see Kelly Strickland, I recommend the followin.  Discontinue Zosyn  2.  Ertapenem 1g IV daily  3.  Advance diet  4.  Possible discharge tomorrow with acute outpatient care in our office after discharge    Dr. Villarreal has obtained the history, performed the physical exam and formulated the above treatment plan.      I discussed her complex situation in detail with the patient herself.  I discussed her situation with Dr. Moses  I will plan to give her approximately 2 weeks of intravenous antibiotic therapy with a follow-up CT scan prior to cessation of intravenous antibiotic therapy.  Hopefully she will be ready for discharge tomorrow.    Bony Villarreal MD  2022  18:56 EST

## 2022-11-21 ENCOUNTER — READMISSION MANAGEMENT (OUTPATIENT)
Dept: CALL CENTER | Facility: HOSPITAL | Age: 67
End: 2022-11-21

## 2022-11-21 VITALS
HEART RATE: 75 BPM | OXYGEN SATURATION: 100 % | BODY MASS INDEX: 27.38 KG/M2 | DIASTOLIC BLOOD PRESSURE: 84 MMHG | SYSTOLIC BLOOD PRESSURE: 131 MMHG | HEIGHT: 65 IN | WEIGHT: 164.3 LBS | TEMPERATURE: 98 F | RESPIRATION RATE: 18 BRPM

## 2022-11-21 PROBLEM — G89.18 ACUTE POST-OPERATIVE PAIN: Status: RESOLVED | Noted: 2017-03-14 | Resolved: 2022-11-21

## 2022-11-21 PROBLEM — S42.309A CLOSED FRACTURE OF HUMERUS: Status: RESOLVED | Noted: 2017-03-10 | Resolved: 2022-11-21

## 2022-11-21 PROBLEM — K57.20 DIVERTICULITIS OF LARGE INTESTINE WITH PERFORATION WITHOUT ABSCESS OR BLEEDING: Status: ACTIVE | Noted: 2022-11-19

## 2022-11-21 LAB
DEPRECATED RDW RBC AUTO: 42.1 FL (ref 37–54)
ERYTHROCYTE [DISTWIDTH] IN BLOOD BY AUTOMATED COUNT: 12.2 % (ref 12.3–15.4)
HCT VFR BLD AUTO: 36.5 % (ref 34–46.6)
HGB BLD-MCNC: 12.1 G/DL (ref 12–15.9)
MCH RBC QN AUTO: 31.3 PG (ref 26.6–33)
MCHC RBC AUTO-ENTMCNC: 33.2 G/DL (ref 31.5–35.7)
MCV RBC AUTO: 94.6 FL (ref 79–97)
PLATELET # BLD AUTO: 228 10*3/MM3 (ref 140–450)
PMV BLD AUTO: 9.2 FL (ref 6–12)
RBC # BLD AUTO: 3.86 10*6/MM3 (ref 3.77–5.28)
WBC NRBC COR # BLD: 4.68 10*3/MM3 (ref 3.4–10.8)

## 2022-11-21 PROCEDURE — 25010000002 ERTAPENEM PER 500 MG: Performed by: NURSE PRACTITIONER

## 2022-11-21 PROCEDURE — 25010000002 ENOXAPARIN PER 10 MG: Performed by: INTERNAL MEDICINE

## 2022-11-21 PROCEDURE — 85027 COMPLETE CBC AUTOMATED: CPT | Performed by: SURGERY

## 2022-11-21 PROCEDURE — 99239 HOSP IP/OBS DSCHRG MGMT >30: CPT | Performed by: PEDIATRICS

## 2022-11-21 PROCEDURE — 96372 THER/PROPH/DIAG INJ SC/IM: CPT

## 2022-11-21 RX ORDER — POLYETHYLENE GLYCOL 3350 17 G/17G
17 POWDER, FOR SOLUTION ORAL DAILY
Qty: 30 PACKET | Refills: 0 | Status: SHIPPED | OUTPATIENT
Start: 2022-11-22

## 2022-11-21 RX ORDER — FAMOTIDINE 20 MG/1
20 TABLET, FILM COATED ORAL
Qty: 60 TABLET | Refills: 0 | Status: SHIPPED | OUTPATIENT
Start: 2022-11-21

## 2022-11-21 RX ADMIN — MELOXICAM 7.5 MG: 7.5 TABLET ORAL at 08:09

## 2022-11-21 RX ADMIN — FAMOTIDINE 20 MG: 20 TABLET ORAL at 08:09

## 2022-11-21 RX ADMIN — Medication 10 ML: at 08:10

## 2022-11-21 RX ADMIN — ERTAPENEM 1 G: 1 INJECTION INTRAMUSCULAR; INTRAVENOUS at 11:43

## 2022-11-21 RX ADMIN — BUPROPION HYDROCHLORIDE 150 MG: 150 TABLET, FILM COATED, EXTENDED RELEASE ORAL at 08:09

## 2022-11-21 RX ADMIN — SERTRALINE HYDROCHLORIDE 100 MG: 100 TABLET ORAL at 08:09

## 2022-11-21 RX ADMIN — ENOXAPARIN SODIUM 40 MG: 40 INJECTION SUBCUTANEOUS at 08:09

## 2022-11-21 NOTE — PROGRESS NOTES
INFECTIOUS DISEASE Progress Note    Kelly Strickland  1955  [id number]      Admission Date: 11/19/2022      Requesting Provider: No ref. provider found  Evaluating Physician: Bony Villarreal MD    Reason for Consultation: Perforated sigmoid diverticulitis     History of present illness:    11/20/22: Patient is a 67 y.o. female, with PMH GERD, seen today for perforated sigmoid diverticulitis.  She presented to the ED with complaints of LLQ pain, with fever up to 102 since Friday.  Ct revealing moderate sigmoid diverticulitis, with very small bubbles of extraluminal air, but no free air or abscess. Labs with WBC 11, lactate 1.1, UA unremarkable, ALT 29, AST 27, Scr 0.76, and blood cultures negative to date. Currently on Zosyn and we were consulted for evaluation and treatment. She has had one previous episode of diverticulitis in the past. Colonoscopy approximately 2 years ago.      11/21/22: She remains afebrile. Her white blood cell count today is 4.7. Blood cultures from 11/19 remain negative. She has a history of chronic hyperbilirubinemia.  She feels much better today.  She denies nausea and vomiting.  She was able to eat.  She has marked improvement in her left lower quadrant abdominal pain.  She would like to go home.      Past Medical History:   Diagnosis Date   • Acid reflux    • Ankle sprain    • Anxiety    • Basal cell carcinoma of skin    • Bursitis of hip    • Diverticulosis    • Fracture of ankle    • Fracture, foot    • Herpes zoster    • Hip arthrosis    • Humeral fracture    • Migraine    • Nephrolithiasis    • Osteopenia    • Tennis elbow    • Thoracic vertebral fracture (HCC)    • Vitamin D deficiency        Past Surgical History:   Procedure Laterality Date   • BREAST BIOPSY Left    • CHOLECYSTECTOMY     • COLONOSCOPY     • CYSTOSCOPY W/ LASER LITHOTRIPSY     • HERNIA REPAIR     • MOHS SURGERY     • ORIF HUMERAL SHAFT FRACTURE     • MO OPEN TREATMENT PROX HUMERAL FRACTURE Right  03/13/2017    Procedure: OPEN REDUCTION INTERNAL FIXATION RIGHT HUMERUS;  Surgeon: Ryan Jimenes MD;  Location: [facility];  Service: Orthopedics   • TONSILLECTOMY         Family History   Problem Relation Age of Onset   • Stroke Mother    • Hypertension Mother    • Osteoporosis Mother    • Kidney cancer Father    • Prostate cancer Father    • Diabetes type II Father    • Breast cancer Sister 59   • Cancer Sister         Breast   • Osteoporosis Maternal Aunt    • Breast cancer Maternal Aunt    • Colon cancer Maternal Aunt    • Scoliosis Sister        Social History     Socioeconomic History   • Marital status:    Tobacco Use   • Smoking status: Never   • Smokeless tobacco: Never   Vaping Use   • Vaping Use: Never used   Substance and Sexual Activity   • Alcohol use: Yes     Alcohol/week: 6.0 standard drinks     Types: 6 Glasses of wine per week     Comment: occasionally   • Drug use: No   • Sexual activity: Yes     Partners: Male     Birth control/protection: None       No Known Allergies      Medication:    Current Facility-Administered Medications:   •  acetaminophen (TYLENOL) tablet 650 mg, 650 mg, Oral, Q4H PRN **OR** acetaminophen (TYLENOL) 160 MG/5ML solution 650 mg, 650 mg, Oral, Q4H PRN **OR** [DISCONTINUED] acetaminophen (TYLENOL) suppository 650 mg, 650 mg, Rectal, Q4H PRN, Willi Deneen M II, DO  •  buPROPion XL (WELLBUTRIN XL) 24 hr tablet 150 mg, 150 mg, Oral, Daily, Deneen Márquez M II, DO, 150 mg at 11/20/22 0809  •  Enoxaparin Sodium (LOVENOX) syringe 40 mg, 40 mg, Subcutaneous, Daily, WilliDeneen conway M II, DO, 40 mg at 11/20/22 0809  •  ertapenem (INVanz) 1 g/100 mL 0.9% NS VTB (mbp), 1 g, Intravenous, Q24H, Shahnaz Aaron APRN, 1 g at 11/20/22 1159  •  famotidine (PEPCID) tablet 20 mg, 20 mg, Oral, BID AC, Roberto Moses MD, 20 mg at 11/20/22 1702  •  HYDROcodone-acetaminophen (NORCO) 5-325 MG per tablet 1 tablet, 1 tablet, Oral, Q4H PRN, WilliFlorentinocy M II, DO  •   Magnesium Sulfate 2 gram Bolus, followed by 8 gram infusion (total Mg dose 10 grams)- Mg less than or equal to 1mg/dL, 2 g, Intravenous, PRN **OR** Magnesium Sulfate 2 gram / 50mL Infusion (GIVE X 3 BAGS TO EQUAL 6GM TOTAL DOSE) - Mg 1.1 - 1.5 mg/dl, 2 g, Intravenous, PRN **OR** Magnesium Sulfate 4 gram infusion- Mg 1.6-1.9 mg/dL, 4 g, Intravenous, PRN, WilliFlorentinocy M II, DO  •  meloxicam (MOBIC) tablet 7.5 mg, 7.5 mg, Oral, Daily, Roberto Moses MD, 7.5 mg at 11/20/22 1158  •  morphine injection 2 mg, 2 mg, Intravenous, Q2H PRN **AND** naloxone (NARCAN) injection 0.4 mg, 0.4 mg, Intravenous, Q5 Min PRN, Deneen Márquez II, DO  •  ondansetron (ZOFRAN) tablet 4 mg, 4 mg, Oral, Q6H PRN **OR** ondansetron (ZOFRAN) injection 4 mg, 4 mg, Intravenous, Q6H PRN, Deneen Márquez M II, DO  •  polyethylene glycol (MIRALAX) packet 17 g, 17 g, Oral, Daily, Roberto Moses MD, 17 g at 11/20/22 1158  •  potassium chloride (MICRO-K) CR capsule 40 mEq, 40 mEq, Oral, PRN **OR** potassium chloride (KLOR-CON) packet 40 mEq, 40 mEq, Oral, PRN **OR** potassium chloride 10 mEq in 100 mL IVPB, 10 mEq, Intravenous, Q1H PRN, Deneen Márquez M II, DO  •  sertraline (ZOLOFT) tablet 100 mg, 100 mg, Oral, Daily, Deneen Márquez II, DO, 100 mg at 11/20/22 0809  •  Sodium Chloride (PF) 0.9 % 10 mL, 10 mL, Intravenous, PRN, Pancho Johnston MD  •  sodium chloride 0.9 % flush 10 mL, 10 mL, Intravenous, Q12H, Deneen Márquez II, DO, 10 mL at 11/20/22 2208  •  sodium chloride 0.9 % flush 10 mL, 10 mL, Intravenous, PRN, Willi, Deneen M II, DO  •  sodium chloride 0.9 % infusion 40 mL, 40 mL, Intravenous, PRN, Willi, Deneen M II, DO  •  sodium chloride 0.9 % infusion, 100 mL/hr, Intravenous, Continuous, Willi, Deneen M II, DO, Last Rate: 100 mL/hr at 11/19/22 1533, 100 mL/hr at 11/19/22 1533    Facility-Administered Medications Ordered in Other Encounters:   •  bupivacaine PF (MARCAINE) 0.25 % injection, , , PRN, Naveen Ruvalcaba  M., CRNA, 15 mL at 17 1200  •  buprenorphine (BUPRENEX) injection, , , PRN, Naveen Ruvalcaba CRNA, 0.3 mg at 17 1200  •  dexamethasone sodium phosphate injection, , Peripheral Nerve, PRN, Naveen Ruvalcaba CRNA, 2 mg at 17 1200    Antibiotics:  Anti-Infectives (From admission, onward)    Ordered     Dose/Rate Route Frequency Start Stop    22 1119  ertapenem (INVanz) 1 g/100 mL 0.9% NS VTB (mbp)        Ordering Provider: Shahnaz Aaron APRN    1 g  over 30 Minutes Intravenous Every 24 Hours 22 1200 22 1159    22 0756  piperacillin-tazobactam (ZOSYN) 3.375 g in iso-osmotic dextrose 50 ml (premix)        Ordering Provider: Pancho Johnston MD    3.375 g Intravenous Once 22 0758 22 0845            Review of Systems:  See HPI    Physical Exam:   Vital Signs  Temp (24hrs), Av.5 °F (36.9 °C), Min:98.1 °F (36.7 °C), Max:98.7 °F (37.1 °C)    Temp  Min: 98.1 °F (36.7 °C)  Max: 98.7 °F (37.1 °C)  BP  Min: 120/53  Max: 132/78  Pulse  Min: 76  Max: 92  Resp  Min: 16  Max: 16  SpO2  Min: 95 %  Max: 96 %    GENERAL: Awake and alert, in no acute distress.   HEENT: Normocephalic, atraumatic.  PERRL. EOMI. No conjunctival injection. No icterus. Oropharynx clear without evidence of thrush or exudate.    NECK: Supple.  HEART: RRR; No murmur, rubs, gallops.   LUNGS: Clear to auscultation bilaterally without wheezing, rales, rhonchi. Normal respiratory effort. .  ABDOMEN: Decreased left lower quadrant tenderness  EXT:  No cyanosis, clubbing or edema. No cord.  :  Without Little catheter.  MSK: No joint effusions or erythema  SKIN: Warm and dry without cutaneous eruptions on Inspection/palpation.    NEURO: Oriented to PPT.  Motor 5/5 strength  PSYCHIATRIC: Normal insight and judgment. Cooperative with PE    Laboratory Data    Results from last 7 days   Lab Units 22  0526 22  0818 22  0810   WBC 10*3/mm3 4.68 7.07 11.41*   HEMOGLOBIN g/dL 12.1 12.8 14.1    HEMATOCRIT % 36.5 39.0 42.1   PLATELETS 10*3/mm3 228 234 298     Results from last 7 days   Lab Units 11/20/22  0818   SODIUM mmol/L 138   POTASSIUM mmol/L 4.2   CHLORIDE mmol/L 103   CO2 mmol/L 22.0   BUN mg/dL 14   CREATININE mg/dL 0.71   GLUCOSE mg/dL 63*   CALCIUM mg/dL 8.8     Results from last 7 days   Lab Units 11/19/22  0810   ALK PHOS U/L 153*   BILIRUBIN mg/dL 2.2*   ALT (SGPT) U/L 29   AST (SGOT) U/L 27             Results from last 7 days   Lab Units 11/19/22  0810   LACTATE mmol/L 1.1             Estimated Creatinine Clearance: 77.7 mL/min (by C-G formula based on SCr of 0.71 mg/dL).      Microbiology:  Blood Culture   Date Value Ref Range Status   11/19/2022 No growth at 24 hours  Preliminary     No results found for: BCIDPCR, CXREFLEX, CSFCX, CULTURETIS  No results found for: CULTURES, HSVCX, URCX  No results found for: EYECULTURE, GCCX, HSVCULTURE, LABHSV  No results found for: LEGIONELLA, MRSACX, MUMPSCX, MYCOPLASCX  No results found for: NOCARDIACX, STOOLCX  No results found for: THROATCX, UNSTIMCULT, URINECX, CULTURE, VZVCULTUR  No results found for: VIRALCULTU, WOUNDCX        Radiology:  Imaging Results (Last 72 Hours)     Procedure Component Value Units Date/Time    CT Abdomen Pelvis Without Contrast [[id number]] Collected: 11/19/22 0848     Updated: 11/19/22 0859    Narrative:      DATE OF EXAM: 11/19/2022 8:39 AM     PROCEDURE: CT ABDOMEN PELVIS WO CONTRAST-     INDICATIONS: LLQ pain and fever w/ hx of diverticulitis     COMPARISON: 7/31/2019     TECHNIQUE: Routine transaxial slices were obtained through the abdomen  and pelvis without the administration of intravenous contrast.  Reconstructed coronal and sagittal images were also obtained. Automated  exposure control and iterative construction methods were used.     The radiation dose reduction device was turned on for each scan per the  ALARA (As Low as Reasonably Achievable) protocol.     FINDINGS:  There is moderate mid sigmoid  diverticulitis, with inflammatory fat  stranding around the mid sigmoid seen from image 117 through image 129.  There may be one or two small bubbles of extraluminal air, difficult to  distinguish from small diverticuli, and there is no evidence of free air  or abscess. There is trace, if any intrapelvic free fluid.     In addition, there is also very subtle retroperitoneal fat stranding  extending from the area of diverticulitis cephalad along the anterior  margin of the left internal iliac artery, common iliac artery and then  left lateral margin of the aorta, perhaps best seen adjacent the aorta  on images 73 through 78, suggesting low-level retroperitoneal  inflammation as well.     Elsewhere, minimal linear scarring is noted in the lung bases. Small  hiatal hernia is noted. Gallbladder is absent. No significant  abnormalities are seen of the spleen, pancreas, adrenal glands, or  kidneys. There is mild fatty liver change. Bowel loops are normal in  caliber. There is a minimal fat-only containing nonstrangulated  periumbilical hernia which appears stable.     Bony structures appear to be intact.        Impression:         1. Moderate sigmoid diverticulitis, with what may be a couple of very  small bubbles of extraluminal air but no free air or abscess.  2. Subtle edema/fat stranding in the retroperitoneum extending up to the  level of the infrarenal abdominal aorta, suggesting early  retroperitoneal involvement by edema/inflammation as well.     This report was finalized on 11/19/2022 8:56 AM by Dr. Carter Whitmore MD.         I read her radiographic images.    Impression:   1.  Perforated sigmoid diverticulitis-she will require approximately 2 weeks of intravenous antibiotic therapy, possibly followed by oral antibiotic therapy.  Prior to cessation of intravenous antibiotic therapy, we will need to obtain a follow-up CT scan.   She is clinically improved and I think she can discharge today on outpatient intravenous  antibiotic therapy daily in our office via peripheral IV  2.  Leukocytosis/neutrophilia-improved  3.  Hyperbilirubinemia-this has been chronic with a bilirubin of 1.7 on 6/21/14.  This is most likely secondary to Gilbert's disease.        PLAN/RECOMMENDATIONS:  1.  Continue Invanz 1 g IV daily-she will need to receive her dose today prior to discharge  2.  Discharge to home today  3.  Follow-up with me in the office tomorrow Tuesday 11/22 at 1545-this appointment has been made    I discussed her disposition in detail with the patient herself today.  I coordinated her disposition with the [facility] nursing staff.  I communicated with Dr. Sanders.  I coordinated her care.  She would discharge today and be seen in our office tomorrow for acute outpatient care with ongoing intravenous Invanz via peripheral IV.  I spent over 35 minutes on her care today.    Bony Villarreal MD  11/21/2022  07:45 EST

## 2022-11-21 NOTE — CASE MANAGEMENT/SOCIAL WORK
Continued Stay Note  [facility]     Patient Name: Kelly Strickland  MRN: [id number]  Today's Date: 11/21/2022    Admit Date: 11/19/2022    Plan: Home with family assistance and IV abx administered at the [facility] office   Discharge Plan     Row Name 11/21/22 1254       Plan    Plan Home with family assistance and IV abx administered at the [facility] office    Patient/Family in Agreement with Plan yes    Plan Comments Followed up with Ms. Strickland at the bedside for discharge planning.  Ms. Strickland is being discharged to home today.  She will be continuing on IV antibiotics at the [facility] office.  She will go daily to [facility] for the IV abx infusion via a PIV.  First appointment noted in EPIC for tomorrow, Tuesday, 11/22/22, at 3:45pm.    Ms. Strickland denies any DME needs.  She was asking about diet progression from her current liquids.  Discussed with Gregorio LEBLANC and diet recommendations will be reviewed with DC teaching.    Ms. Strickland will be transporting herself home when discharged.    Final Discharge Disposition Code 01 - home or self-care                            Expected Discharge Date and Time     Expected Discharge Date Expected Discharge Time    Nov 21, 2022             Anamaria Huddleston RN

## 2022-11-21 NOTE — DISCHARGE INSTRUCTIONS
Low residue Diet--Please do this for 1 month    You Can Eat  Grains    Refined or enriched white breads and plain crackers, such as saltines or Fiona toast (no seeds)  Cooked cereals, like farina, cream of wheat, and grits  Cold cereals, like puffed rice and corn flakes  White rice, noodles, and refined pasta  Fruits and Vegetables    The skin and seeds of many fruits and vegetables are full of fiber, so you need to peel them and avoid the seeds.    These vegetables are OK:    Well-cooked fresh vegetables or canned vegetables without seeds, like asparagus tips, beets, green beans, carrots, mushrooms, spinach, squash (no seeds), and pumpkin  Cooked potatoes without skin  Tomato sauce (no seeds)  Fruits on the good list include:    Ripe bananas  Soft cantaloupe  Honeydew  Canned or cooked fruits without seeds or skin, like applesauce or canned pears  Avocado  Milk and Dairy    They're OK in moderation. Milk has no fiber, but it may trigger symptoms like diarrhea and cramping if you're lactose intolerant. If you are (meaning you have trouble processing dairy foods), you could take lactase supplements or buy lactose-free products.    Meats    Animal products don't have fiber. You can eat beef, lamb, chicken, fish (no bones), and pork, as long as they're lean, tender, and soft. Eggs are OK, too.    Fats, Sauces, and Condiments    These are all on the diet:    Margarine, butter, and oils  Mayonnaise and ketchup  Sour cream  Smooth sauces and salad dressing  Soy sauce  Clear jelly, honey, and syrup  Sweets and Snacks    You can satisfy your sweet tooth on a low-residue diet. These desserts and snacks are OK to eat in moderation:    Plain cakes and cookies  Gelatin, plain puddings, custard, and sherbet  Ice cream and ice pops  Hard candy  Pretzels (not whole-grain varieties)  Vanilla wafers  Drinks    Safe beverages include:    Decaffeinated coffee, tea, and carbonated beverages (caffeine can upset your  stomach)  Milk  Juices made without seeds or pulp, like apple, no-pulp orange, and cranberry  Strained vegetable juices       What You Can’t Eat  On this plan, you’ll stay away from:    Coconut, seeds, and nuts, including those found in bread, cereal, desserts, and candy  Whole-grain products, including breads, cereals, crackers, pasta, rice, and kasha  Raw or dried fruits, like prunes, berries, raisins, figs, and pineapple  Most raw vegetables  Certain cooked vegetables, including peas, broccoli, winter squash, [place] sprouts, cabbage, corn (and cornbread), onions, cauliflower, potatoes with skin, and baked beans  Beans, lentils, and tofu  Tough meats with gristle, and smoked or cured deli meats  Cheese with seeds, nuts, or fruit  Crunchy peanut butter, jam, marmalade, and preserves  Pickles, olives, relish, sauerkraut, and horseradish  Popcorn  Fruit juices with pulp or seeds, prune juice, and pear nectar

## 2022-11-21 NOTE — PROGRESS NOTES
"General Surgery Daily Progress Note    Subjective:  Tolerating p.o. with GI function.  Some diarrhea.    Objective:  /59 (BP Location: Right arm, Patient Position: Lying)   Pulse 79   Temp 98.3 °F (36.8 °C) (Oral)   Resp 18   Ht 165.1 cm (65\")   Wt 74.5 kg (164 lb 4.8 oz)   SpO2 96%   BMI 27.34 kg/m²       General Appearance: No apparent distress  Eyes: Anicteric  Neck: Trachea midline   Cardiovascular:  RRR without murmur nor rub  Lungs:  Bilateral respirations unlabored   Abdomen:  Soft, minimal lower abdominal tenderness, no generalized peritonitis  Extremities:  No cyanosis or edema   Skin:  No obvious rashes   Neurologic: awake and conversant       Imaging Results (Last 24 Hours)     ** No results found for the last 24 hours. **          CBC:  Results from last 7 days   Lab Units 11/21/22  0526   WBC 10*3/mm3 4.68   HEMOGLOBIN g/dL 12.1   HEMATOCRIT % 36.5   PLATELETS 10*3/mm3 228       CMP:  Results from last 7 days   Lab Units 11/20/22  0818 11/19/22  0810   SODIUM mmol/L 138 138   POTASSIUM mmol/L 4.2 4.1   CHLORIDE mmol/L 103 101   CO2 mmol/L 22.0 26.0   BUN mg/dL 14 11   CREATININE mg/dL 0.71 0.76   CALCIUM mg/dL 8.8 9.1   BILIRUBIN mg/dL  --  2.2*   ALK PHOS U/L  --  153*   ALT (SGPT) U/L  --  29   AST (SGOT) U/L  --  27   GLUCOSE mg/dL 63* 111*         Assessment:  Acute sigmoid diverticulitis with microperforation    Plan:     Labs noted, WBC normal.  Tolerating p.o. with GI function, some diarrhea.  Recommend a low residue diet x1 month, then may transition to a high-fiber diet.  Recommend MiraLAX daily for the foreseeable future.  Antibiotics per infectious disease, Dr. Villarreal, appreciate assistance.  No plans at this point for surgical intervention.    May follow-up one month with me, as needed.    Roberto Moses MD - 11/21/2022, 10:16 EST        "

## 2022-11-21 NOTE — DISCHARGE SUMMARY
[facility]  DISCHARGE SUMMARY    Patient Name: Kelly Strickland  : 1955  MRN: [id number]    Date of Admission: 2022  7:53 AM  Date of Discharge:  2022    Primary Care Physician: Mary Antonio DO    Consults     Date and Time Order Name Status Description    2022 10:35 AM Inpatient Infectious Diseases Consult Completed     2022  1:52 PM Inpatient General Surgery Consult Completed           Hospital Course     Presenting Problem:   Acute diverticulitis [K57.92]    Active Hospital Problems    Diagnosis  POA   • **Diverticulitis of large intestine with perforation without abscess or bleeding [K57.20]  Unknown   • GERD (gastroesophageal reflux disease) [K21.9]  Yes      Resolved Hospital Problems   No resolved problems to display.          Hospital Course:  Kelly Strickland is a 67 y.o. female with no significant PMHx, presented with LLQ pain, found to have acute sigmoid diverticulitis that is complicated by a microperforations.  General surgery and ID was consulted.  She was started on IV zosyn.  Surgery felt no surgical intervention was necessary.  ID recommended changing to ertapenem.  Pt did remarkably well, tolerating her diet the following day.  OK to d/c with follow up with ID for daily infusions for 2 weeks and will repeat scan afterwards to determine if any further antibiotics are necessary.      Discharge Follow Up Recommendations for outpatient labs/diagnostics:   f/u with ID    Day of Discharge     HPI:   Pt doing well.  Some mild belly pain,eating fair.  No fever.     Review of Systems  Gen- No fevers, chills  CV- No chest pain, palpitations  Resp- No cough, dyspnea  GI- No N/V/D, abd pain        Vital Signs:   Temp:  [98 °F (36.7 °C)-98.7 °F (37.1 °C)] 98 °F (36.7 °C)  Heart Rate:  [68-84] 75  Resp:  [16-18] 18  BP: (109-132)/(53-84) 131/84      Physical Exam:  Constitutional: No acute distress, awake, alert  HENT: NCAT,  mucous membranes moist  Respiratory: Clear to auscultation bilaterally, respiratory effort normal   Cardiovascular: RRR, no murmurs, rubs, or gallops  Gastrointestinal: Positive bowel sounds, soft, mild ntenderness in LLQ, nondistended  Musculoskeletal: No bilateral ankle edema  Psychiatric: Appropriate affect, cooperative  Neurologic: Oriented x 3, strength symmetric in all extremities, Cranial Nerves grossly intact to confrontation, speech clear  Skin: No rashes      Pertinent  and/or Most Recent Results     LAB RESULTS:      Lab 11/21/22  0526 11/20/22  0818 11/19/22  0810   WBC 4.68 7.07 11.41*   HEMOGLOBIN 12.1 12.8 14.1   HEMATOCRIT 36.5 39.0 42.1   PLATELETS 228 234 298   NEUTROS ABS  --   --  9.09*   IMMATURE GRANS (ABS)  --   --  0.06*   LYMPHS ABS  --   --  1.40   MONOS ABS  --   --  0.76   EOS ABS  --   --  0.08   MCV 94.6 95.4 95.0   LACTATE  --   --  1.1         Lab 11/20/22  0818 11/19/22  0810   SODIUM 138 138   POTASSIUM 4.2 4.1   CHLORIDE 103 101   CO2 22.0 26.0   ANION GAP 13.0 11.0   BUN 14 11   CREATININE 0.71 0.76   EGFR 93.3 86.0   GLUCOSE 63* 111*   CALCIUM 8.8 9.1         Lab 11/19/22  0810   TOTAL PROTEIN 7.4   ALBUMIN 4.30   GLOBULIN 3.1   ALT (SGPT) 29   AST (SGOT) 27   BILIRUBIN 2.2*   ALK PHOS 153*   LIPASE 20                     Brief Urine Lab Results  (Last result in the past 365 days)      Color   Clarity   Blood   Leuk Est   Nitrite   Protein   CREAT   Urine HCG        11/19/22 0810 Yellow   Cloudy   Negative   Small (1+)   Negative   Negative               Microbiology Results (last 10 days)     Procedure Component Value - Date/Time    Blood Culture - Blood, Arm, Right [[id number]]  (Normal) Collected: 11/19/22 0820    Lab Status: Preliminary result Specimen: Blood from Arm, Right Updated: 11/21/22 0845     Blood Culture No growth at 2 days    Blood Culture - Blood, Arm, Left [[id number]]  (Normal) Collected: 11/19/22 0807    Lab Status: Preliminary result Specimen: Blood from Arm,  Left Updated: 11/21/22 0845     Blood Culture No growth at 2 days          CT Abdomen Pelvis Without Contrast    Result Date: 11/19/2022  DATE OF EXAM: 11/19/2022 8:39 AM  PROCEDURE: CT ABDOMEN PELVIS WO CONTRAST-  INDICATIONS: LLQ pain and fever w/ hx of diverticulitis  COMPARISON: 7/31/2019  TECHNIQUE: Routine transaxial slices were obtained through the abdomen and pelvis without the administration of intravenous contrast. Reconstructed coronal and sagittal images were also obtained. Automated exposure control and iterative construction methods were used.  The radiation dose reduction device was turned on for each scan per the ALARA (As Low as Reasonably Achievable) protocol.  FINDINGS: There is moderate mid sigmoid diverticulitis, with inflammatory fat stranding around the mid sigmoid seen from image 117 through image 129. There may be one or two small bubbles of extraluminal air, difficult to distinguish from small diverticuli, and there is no evidence of free air or abscess. There is trace, if any intrapelvic free fluid.  In addition, there is also very subtle retroperitoneal fat stranding extending from the area of diverticulitis cephalad along the anterior margin of the left internal iliac artery, common iliac artery and then left lateral margin of the aorta, perhaps best seen adjacent the aorta on images 73 through 78, suggesting low-level retroperitoneal inflammation as well.  Elsewhere, minimal linear scarring is noted in the lung bases. Small hiatal hernia is noted. Gallbladder is absent. No significant abnormalities are seen of the spleen, pancreas, adrenal glands, or kidneys. There is mild fatty liver change. Bowel loops are normal in caliber. There is a minimal fat-only containing nonstrangulated periumbilical hernia which appears stable.  Bony structures appear to be intact.       1. Moderate sigmoid diverticulitis, with what may be a couple of very small bubbles of extraluminal air but no free air or  abscess. 2. Subtle edema/fat stranding in the retroperitoneum extending up to the level of the infrarenal abdominal aorta, suggesting early retroperitoneal involvement by edema/inflammation as well.  This report was finalized on 11/19/2022 8:56 AM by Dr. Carter Whitmore MD.        Results for orders placed during the hospital encounter of 10/17/22    Duplex Venous Lower Extremity - Left CAR    Interpretation Summary  •  Normal left lower extremity venous duplex scan.      Results for orders placed during the hospital encounter of 10/17/22    Duplex Venous Lower Extremity - Left CAR    Interpretation Summary  •  Normal left lower extremity venous duplex scan.          Plan for Follow-up of Pending Labs/Results: Follow up with ID  Pending Labs     Order Current Status    Blood Culture - Blood, Arm, Left Preliminary result    Blood Culture - Blood, Arm, Right Preliminary result        Discharge Details        Discharge Medications      New Medications      Instructions Start Date   ertapenem 1 gm/100ml solution IV  Commonly known as: INVanz   1 g, Intravenous, Every 24 Hours   Start Date: November 22, 2022     famotidine 20 MG tablet  Commonly known as: PEPCID   20 mg, Oral, 2 Times Daily Before Meals      polyethylene glycol 17 g packet  Commonly known as: MIRALAX   17 g, Oral, Daily   Start Date: November 22, 2022        Continue These Medications      Instructions Start Date   buPROPion  MG 24 hr tablet  Commonly known as: WELLBUTRIN XL   150 mg, Oral, Daily      sertraline 100 MG tablet  Commonly known as: ZOLOFT   100 mg, Oral, Daily             No Known Allergies      Discharge Disposition:  Home or Self Care    Diet:  Hospital:  Diet Order   Procedures   • Diet: Liquid Diets; Full Liquid; Texture: Regular Texture (IDDSI 7); Fluid Consistency: Thin (IDDSI 0)       Activity:      Restrictions or Other Recommendations:  As tolerated       CODE STATUS:    Code Status and Medical Interventions:   Ordered at:  11/19/22 1125     Code Status (Patient has no pulse and is not breathing):    CPR (Attempt to Resuscitate)     Medical Interventions (Patient has pulse or is breathing):    Full Support       Future Appointments   Date Time Provider Department Center   5/1/2023 10:00 AM Mary Antonio DO [facility] Courtney Edgar-Zarate, MD  11/21/22      Time Spent on Discharge:  I spent  32  minutes on this discharge activity which included: face-to-face encounter with the patient, reviewing the data in the system, coordination of the care with the nursing staff as well as consultants, documentation, and entering orders.

## 2022-11-22 ENCOUNTER — TRANSITIONAL CARE MANAGEMENT TELEPHONE ENCOUNTER (OUTPATIENT)
Dept: CALL CENTER | Facility: HOSPITAL | Age: 67
End: 2022-11-22

## 2022-11-22 NOTE — OUTREACH NOTE
"Call Center TCM Note    Flowsheet Row Responses   [facility] patient discharged from? [facility]   Does the patient have one of the following disease processes/diagnoses(primary or secondary)? Other   TCM attempt successful? Yes   Call start time 1354   Call end time 1356   Discharge diagnosis Acute diverticulitis   Meds reviewed with patient/caregiver? Yes   Is the patient having any side effects they believe may be caused by any medication additions or changes? No   Does the patient have all medications ordered at discharge? Yes   Is the patient taking all medications as directed (includes completed medication regime)? Yes   Comments Pt does not want to follow up with PCP at this time. Pt states, \"if Dr. Antonio wants to see me just call otherwise she can just look at my records.\"   Does the patient have an appointment with their PCP within 7 days of discharge? Yes   Psychosocial issues? No   Did the patient receive a copy of their discharge instructions? Yes   Nursing interventions Reviewed instructions with patient   What is the patient's perception of their health status since discharge? Improving   Is the patient/caregiver able to teach back signs and symptoms related to disease process for when to call PCP? Yes   Is the patient/caregiver able to teach back signs and symptoms related to disease process for when to call 911? Yes   Is the patient/caregiver able to teach back the hierarchy of who to call/visit for symptoms/problems? PCP, Specialist, Home health nurse, Urgent Care, ED, 911 Yes   If the patient is a current smoker, are they able to teach back resources for cessation? Not a smoker   TCM call completed? Yes   Call end time 1356   Would this patient benefit from a Referral to Amb Social Work? No   Is the patient interested in additional calls from an ambulatory ?  NOTE:  applies to high risk patients requiring additional follow-up. No          Ela Pratt RN    11/22/2022, " 13:57 EST

## 2022-11-22 NOTE — OUTREACH NOTE
Prep Survey    Flowsheet Row Responses   [facility] facility patient discharged from? [place]   Is LACE score < 7 ? Yes   Emergency Room discharge w/ pulse ox? No   Eligibility [facility]   Date of Admission 11/19/22   Date of Discharge 11/21/22   Discharge Disposition Home or Self Care   Discharge diagnosis Acute diverticulitis   Does the patient have one of the following disease processes/diagnoses(primary or secondary)? Other   Does the patient have Home health ordered? No   Is there a DME ordered? No   Comments regarding appointments IV abx at [facility] office   Prep survey completed? Yes          DOMINGO EATON - Registered Nurse

## 2022-11-23 ENCOUNTER — TRANSCRIBE ORDERS (OUTPATIENT)
Dept: ADMINISTRATIVE | Facility: HOSPITAL | Age: 67
End: 2022-11-23

## 2022-11-23 DIAGNOSIS — K57.20 DIVERTICULITIS OF LARGE INTESTINE WITH PERFORATION WITHOUT BLEEDING: Primary | ICD-10-CM

## 2022-11-24 LAB
BACTERIA SPEC AEROBE CULT: NORMAL
BACTERIA SPEC AEROBE CULT: NORMAL

## 2022-11-28 ENCOUNTER — TRANSCRIBE ORDERS (OUTPATIENT)
Dept: LAB | Facility: HOSPITAL | Age: 67
End: 2022-11-28

## 2022-11-28 ENCOUNTER — LAB (OUTPATIENT)
Dept: LAB | Facility: HOSPITAL | Age: 67
End: 2022-11-28

## 2022-11-28 DIAGNOSIS — K57.20 PERFORATED DIVERTICULUM OF LARGE INTESTINE: Primary | ICD-10-CM

## 2022-11-28 LAB
ALBUMIN SERPL-MCNC: 4.1 G/DL (ref 3.5–5.2)
ALBUMIN/GLOB SERPL: 1.4 G/DL
ALP SERPL-CCNC: 117 U/L (ref 39–117)
ALT SERPL W P-5'-P-CCNC: 22 U/L (ref 1–33)
ANION GAP SERPL CALCULATED.3IONS-SCNC: 10 MMOL/L (ref 5–15)
AST SERPL-CCNC: 20 U/L (ref 1–32)
BASOPHILS # BLD AUTO: 0.03 10*3/MM3 (ref 0–0.2)
BASOPHILS NFR BLD AUTO: 0.7 % (ref 0–1.5)
BILIRUB SERPL-MCNC: 0.7 MG/DL (ref 0–1.2)
BUN SERPL-MCNC: 10 MG/DL (ref 8–23)
BUN/CREAT SERPL: 10.8 (ref 7–25)
CALCIUM SPEC-SCNC: 9.2 MG/DL (ref 8.6–10.5)
CHLORIDE SERPL-SCNC: 101 MMOL/L (ref 98–107)
CO2 SERPL-SCNC: 27 MMOL/L (ref 22–29)
CREAT SERPL-MCNC: 0.93 MG/DL (ref 0.57–1)
CRP SERPL-MCNC: <0.3 MG/DL (ref 0–0.5)
DEPRECATED RDW RBC AUTO: 41.1 FL (ref 37–54)
EGFRCR SERPLBLD CKD-EPI 2021: 67.5 ML/MIN/1.73
EOSINOPHIL # BLD AUTO: 0.15 10*3/MM3 (ref 0–0.4)
EOSINOPHIL NFR BLD AUTO: 3.3 % (ref 0.3–6.2)
ERYTHROCYTE [DISTWIDTH] IN BLOOD BY AUTOMATED COUNT: 11.8 % (ref 12.3–15.4)
ERYTHROCYTE [SEDIMENTATION RATE] IN BLOOD: 28 MM/HR (ref 0–30)
GLOBULIN UR ELPH-MCNC: 2.9 GM/DL
GLUCOSE SERPL-MCNC: 103 MG/DL (ref 65–99)
HCT VFR BLD AUTO: 41.7 % (ref 34–46.6)
HGB BLD-MCNC: 13.8 G/DL (ref 12–15.9)
IMM GRANULOCYTES # BLD AUTO: 0.01 10*3/MM3 (ref 0–0.05)
IMM GRANULOCYTES NFR BLD AUTO: 0.2 % (ref 0–0.5)
LYMPHOCYTES # BLD AUTO: 1.64 10*3/MM3 (ref 0.7–3.1)
LYMPHOCYTES NFR BLD AUTO: 36.4 % (ref 19.6–45.3)
MCH RBC QN AUTO: 31.2 PG (ref 26.6–33)
MCHC RBC AUTO-ENTMCNC: 33.1 G/DL (ref 31.5–35.7)
MCV RBC AUTO: 94.1 FL (ref 79–97)
MONOCYTES # BLD AUTO: 0.36 10*3/MM3 (ref 0.1–0.9)
MONOCYTES NFR BLD AUTO: 8 % (ref 5–12)
NEUTROPHILS NFR BLD AUTO: 2.31 10*3/MM3 (ref 1.7–7)
NEUTROPHILS NFR BLD AUTO: 51.4 % (ref 42.7–76)
NRBC BLD AUTO-RTO: 0 /100 WBC (ref 0–0.2)
PLATELET # BLD AUTO: 321 10*3/MM3 (ref 140–450)
PMV BLD AUTO: 9.2 FL (ref 6–12)
POTASSIUM SERPL-SCNC: 4.3 MMOL/L (ref 3.5–5.2)
PROT SERPL-MCNC: 7 G/DL (ref 6–8.5)
RBC # BLD AUTO: 4.43 10*6/MM3 (ref 3.77–5.28)
SODIUM SERPL-SCNC: 138 MMOL/L (ref 136–145)
WBC NRBC COR # BLD: 4.5 10*3/MM3 (ref 3.4–10.8)

## 2022-11-28 PROCEDURE — 85025 COMPLETE CBC W/AUTO DIFF WBC: CPT | Performed by: INTERNAL MEDICINE

## 2022-11-28 PROCEDURE — 85652 RBC SED RATE AUTOMATED: CPT | Performed by: INTERNAL MEDICINE

## 2022-11-28 PROCEDURE — 36415 COLL VENOUS BLD VENIPUNCTURE: CPT | Performed by: INTERNAL MEDICINE

## 2022-11-28 PROCEDURE — 86140 C-REACTIVE PROTEIN: CPT | Performed by: INTERNAL MEDICINE

## 2022-11-28 PROCEDURE — 80053 COMPREHEN METABOLIC PANEL: CPT | Performed by: INTERNAL MEDICINE

## 2022-12-04 ENCOUNTER — LAB (OUTPATIENT)
Dept: LAB | Facility: HOSPITAL | Age: 67
End: 2022-12-04

## 2022-12-04 ENCOUNTER — TRANSCRIBE ORDERS (OUTPATIENT)
Dept: LAB | Facility: HOSPITAL | Age: 67
End: 2022-12-04

## 2022-12-04 DIAGNOSIS — I10 ESSENTIAL HYPERTENSION, BENIGN: ICD-10-CM

## 2022-12-04 DIAGNOSIS — D72.823 NEUTROPHILIC LEUKEMOID REACTION: ICD-10-CM

## 2022-12-04 DIAGNOSIS — K57.20 DIVERTICULITIS OF COLON WITH PERFORATION: Primary | ICD-10-CM

## 2022-12-04 DIAGNOSIS — K57.20 DIVERTICULITIS OF COLON WITH PERFORATION: ICD-10-CM

## 2022-12-04 LAB
ALBUMIN SERPL-MCNC: 4 G/DL (ref 3.5–5.2)
ALBUMIN/GLOB SERPL: 1.4 G/DL
ALP SERPL-CCNC: 108 U/L (ref 39–117)
ALT SERPL W P-5'-P-CCNC: 21 U/L (ref 1–33)
ANION GAP SERPL CALCULATED.3IONS-SCNC: 8 MMOL/L (ref 5–15)
AST SERPL-CCNC: 25 U/L (ref 1–32)
BASOPHILS # BLD AUTO: 0.02 10*3/MM3 (ref 0–0.2)
BASOPHILS NFR BLD AUTO: 0.4 % (ref 0–1.5)
BILIRUB SERPL-MCNC: 0.9 MG/DL (ref 0–1.2)
BUN SERPL-MCNC: 14 MG/DL (ref 8–23)
BUN/CREAT SERPL: 20.9 (ref 7–25)
CALCIUM SPEC-SCNC: 8.8 MG/DL (ref 8.6–10.5)
CHLORIDE SERPL-SCNC: 105 MMOL/L (ref 98–107)
CO2 SERPL-SCNC: 28 MMOL/L (ref 22–29)
CREAT SERPL-MCNC: 0.67 MG/DL (ref 0.57–1)
CRP SERPL-MCNC: <0.3 MG/DL (ref 0–0.5)
DEPRECATED RDW RBC AUTO: 40.6 FL (ref 37–54)
EGFRCR SERPLBLD CKD-EPI 2021: 95.9 ML/MIN/1.73
EOSINOPHIL # BLD AUTO: 0.2 10*3/MM3 (ref 0–0.4)
EOSINOPHIL NFR BLD AUTO: 4.4 % (ref 0.3–6.2)
ERYTHROCYTE [DISTWIDTH] IN BLOOD BY AUTOMATED COUNT: 11.8 % (ref 12.3–15.4)
ERYTHROCYTE [SEDIMENTATION RATE] IN BLOOD: 17 MM/HR (ref 0–30)
GLOBULIN UR ELPH-MCNC: 2.8 GM/DL
GLUCOSE SERPL-MCNC: 87 MG/DL (ref 65–99)
HCT VFR BLD AUTO: 40 % (ref 34–46.6)
HGB BLD-MCNC: 13.3 G/DL (ref 12–15.9)
IMM GRANULOCYTES # BLD AUTO: 0.01 10*3/MM3 (ref 0–0.05)
IMM GRANULOCYTES NFR BLD AUTO: 0.2 % (ref 0–0.5)
LYMPHOCYTES # BLD AUTO: 1.55 10*3/MM3 (ref 0.7–3.1)
LYMPHOCYTES NFR BLD AUTO: 34.1 % (ref 19.6–45.3)
MCH RBC QN AUTO: 31.1 PG (ref 26.6–33)
MCHC RBC AUTO-ENTMCNC: 33.3 G/DL (ref 31.5–35.7)
MCV RBC AUTO: 93.7 FL (ref 79–97)
MONOCYTES # BLD AUTO: 0.38 10*3/MM3 (ref 0.1–0.9)
MONOCYTES NFR BLD AUTO: 8.4 % (ref 5–12)
NEUTROPHILS NFR BLD AUTO: 2.39 10*3/MM3 (ref 1.7–7)
NEUTROPHILS NFR BLD AUTO: 52.5 % (ref 42.7–76)
NRBC BLD AUTO-RTO: 0 /100 WBC (ref 0–0.2)
PLATELET # BLD AUTO: 287 10*3/MM3 (ref 140–450)
PMV BLD AUTO: 8.7 FL (ref 6–12)
POTASSIUM SERPL-SCNC: 4 MMOL/L (ref 3.5–5.2)
PROT SERPL-MCNC: 6.8 G/DL (ref 6–8.5)
RBC # BLD AUTO: 4.27 10*6/MM3 (ref 3.77–5.28)
SODIUM SERPL-SCNC: 141 MMOL/L (ref 136–145)
WBC NRBC COR # BLD: 4.55 10*3/MM3 (ref 3.4–10.8)

## 2022-12-04 PROCEDURE — 85652 RBC SED RATE AUTOMATED: CPT

## 2022-12-04 PROCEDURE — 36415 COLL VENOUS BLD VENIPUNCTURE: CPT

## 2022-12-04 PROCEDURE — 86140 C-REACTIVE PROTEIN: CPT

## 2022-12-04 PROCEDURE — 80053 COMPREHEN METABOLIC PANEL: CPT

## 2022-12-04 PROCEDURE — 85025 COMPLETE CBC W/AUTO DIFF WBC: CPT

## 2022-12-05 ENCOUNTER — HOSPITAL ENCOUNTER (OUTPATIENT)
Dept: CT IMAGING | Facility: HOSPITAL | Age: 67
Discharge: HOME | End: 2022-12-05
Admitting: INTERNAL MEDICINE

## 2022-12-05 DIAGNOSIS — K57.20 DIVERTICULITIS OF LARGE INTESTINE WITH PERFORATION WITHOUT BLEEDING: ICD-10-CM

## 2022-12-05 PROCEDURE — 25010000002 IOPAMIDOL 61 % SOLUTION: Performed by: INTERNAL MEDICINE

## 2022-12-05 PROCEDURE — 74177 CT ABD & PELVIS W/CONTRAST: CPT

## 2022-12-05 RX ADMIN — IOPAMIDOL 98 ML: 612 INJECTION, SOLUTION INTRAVENOUS at 09:27

## 2022-12-09 ENCOUNTER — APPOINTMENT (OUTPATIENT)
Dept: CT IMAGING | Facility: HOSPITAL | Age: 67
End: 2022-12-09

## 2023-03-01 ENCOUNTER — TELEPHONE (OUTPATIENT)
Dept: INTERNAL MEDICINE | Facility: CLINIC | Age: 68
End: 2023-03-01

## 2023-03-01 RX ORDER — SERTRALINE HYDROCHLORIDE 100 MG/1
100 TABLET, FILM COATED ORAL DAILY
Status: CANCELLED | OUTPATIENT
Start: 2023-03-01

## 2023-03-01 NOTE — TELEPHONE ENCOUNTER
"Caller: Kelly Strickland    Relationship: Self    Best call back number: [phone]    Requested Prescriptions:   Requested Prescriptions     Pending Prescriptions Disp Refills   • sertraline (ZOLOFT) 100 MG tablet       Sig: Take 1 tablet by mouth Daily.        Pharmacy where request should be sent: [id number] - [facility] [address] AT [facility] [address] [facility] [phone] [facility] [phone] FX     Additional details provided by patient: PATIENT IS COMPLETELY OUT OF THIS MEDICATION. SHE STATES THAT SHE RECENTLY TRANSFERRED HER PRESCRIPTIONS TO THE [facility] ON [address] AND WHILE THE WELLBUTRIN PRESCRIPTION TRANSFERRED SUCCESSFULLY, THE ZOLOFT PRESCRIPTION WAS NOT ABLE TO BE TRANSFERRED AND WAS DESCRIBED AS \"INACTIVE\" BY THE PHARMACY.    PLEASE ADVISE PATIENT.     Does the patient have less than a 3 day supply:  [x] Yes  [] No    Would you like a call back once the refill request has been completed: [x] Yes [] No    If the office needs to give you a call back, can they leave a voicemail: [x] Yes [] No    Reanna Lowery Rep   03/01/23 16:31 EST   "

## 2023-03-02 RX ORDER — SERTRALINE HYDROCHLORIDE 100 MG/1
100 TABLET, FILM COATED ORAL DAILY
Qty: 30 TABLET | Refills: 1 | Status: SHIPPED | OUTPATIENT
Start: 2023-03-02

## 2023-05-01 ENCOUNTER — LAB (OUTPATIENT)
Dept: LAB | Facility: HOSPITAL | Age: 68
End: 2023-05-01
Payer: MEDICARE

## 2023-05-01 ENCOUNTER — OFFICE VISIT (OUTPATIENT)
Dept: INTERNAL MEDICINE | Facility: CLINIC | Age: 68
End: 2023-05-01
Payer: MEDICARE

## 2023-05-01 VITALS
SYSTOLIC BLOOD PRESSURE: 130 MMHG | TEMPERATURE: 97.5 F | OXYGEN SATURATION: 99 % | HEIGHT: 65 IN | HEART RATE: 97 BPM | WEIGHT: 165 LBS | DIASTOLIC BLOOD PRESSURE: 84 MMHG | BODY MASS INDEX: 27.49 KG/M2

## 2023-05-01 DIAGNOSIS — Z13.220 SCREENING CHOLESTEROL LEVEL: ICD-10-CM

## 2023-05-01 DIAGNOSIS — F41.9 ANXIETY: ICD-10-CM

## 2023-05-01 DIAGNOSIS — Z78.0 POSTMENOPAUSE: ICD-10-CM

## 2023-05-01 DIAGNOSIS — E66.3 OVERWEIGHT (BMI 25.0-29.9): ICD-10-CM

## 2023-05-01 DIAGNOSIS — Z00.00 WELCOME TO MEDICARE PREVENTIVE VISIT: ICD-10-CM

## 2023-05-01 DIAGNOSIS — Z13.29 THYROID DISORDER SCREEN: ICD-10-CM

## 2023-05-01 DIAGNOSIS — Z23 NEED FOR VACCINATION: ICD-10-CM

## 2023-05-01 DIAGNOSIS — Z00.00 WELCOME TO MEDICARE PREVENTIVE VISIT: Primary | ICD-10-CM

## 2023-05-01 PROCEDURE — 84443 ASSAY THYROID STIM HORMONE: CPT

## 2023-05-01 PROCEDURE — 85025 COMPLETE CBC W/AUTO DIFF WBC: CPT

## 2023-05-01 PROCEDURE — 80061 LIPID PANEL: CPT

## 2023-05-01 RX ORDER — BUPROPION HYDROCHLORIDE 150 MG/1
150 TABLET ORAL DAILY
Qty: 90 TABLET | Refills: 3 | Status: SHIPPED | OUTPATIENT
Start: 2023-05-01

## 2023-05-01 RX ORDER — SERTRALINE HYDROCHLORIDE 100 MG/1
100 TABLET, FILM COATED ORAL DAILY
Qty: 90 TABLET | Refills: 3 | Status: SHIPPED | OUTPATIENT
Start: 2023-05-01

## 2023-05-01 NOTE — PROGRESS NOTES
The ABCs of the Annual Wellness Visit  Welcome to Medicare Visit    Subjective     Kelly Strickland is a 68 y.o. female who presents for a  Welcome to Medicare Visit.    The following portions of the patient's history were reviewed and   updated as appropriate: allergies, current medications, past family history, past medical history, past social history, past surgical history and problem list.     Compared to one year ago, the patient feels her physical   health is the same.    Compared to one year ago, the patient feels her mental   health is the same.    Recent Hospitalizations:  She was admitted within the past 365 days at [facility].       Current Medical Providers:  Patient Care Team:  Mary Antonio DO as PCP - General    Outpatient Medications Prior to Visit   Medication Sig Dispense Refill   • buPROPion XL (WELLBUTRIN XL) 150 MG 24 hr tablet Take 1 tablet by mouth Daily. 90 tablet 3   • sertraline (ZOLOFT) 100 MG tablet Take 1 tablet by mouth Daily. 30 tablet 1   • famotidine (PEPCID) 20 MG tablet Take 1 tablet by mouth 2 (Two) Times a Day Before Meals. 60 tablet 0   • polyethylene glycol (MIRALAX) 17 g packet Take 17 g by mouth Daily. 30 packet 0     Facility-Administered Medications Prior to Visit   Medication Dose Route Frequency Provider Last Rate Last Admin   • bupivacaine PF (MARCAINE) 0.25 % injection    PRN Naveen Ruvalcaba CRNA   15 mL at 03/14/17 1200   • buprenorphine (BUPRENEX) injection    PRN Naveen Ruvalcaba CRNA   0.3 mg at 03/14/17 1200   • dexamethasone sodium phosphate injection   Peripheral Nerve PRN Naveen Ruvalcaba CRNA   2 mg at 03/14/17 1200       Opioid medication/s are on active medication list.  and I have evaluated her active treatment plan and pain score trends (see table).  There were no vitals filed for this visit.  I have reviewed the chart for potential of high risk medication and harmful drug interactions in the elderly.            Aspirin is not on active  "medication list.  Aspirin use is not indicated based on review of current medical condition/s. Risk of harm outweighs potential benefits.  .    Patient Active Problem List   Diagnosis   • Disorder of bilirubin excretion   • Elevated blood-pressure reading without diagnosis of hypertension   • Herpes zoster   • Carotid bruit   • GERD (gastroesophageal reflux disease)   • Osteopenia   • S/P ORIF right humeral shaft fracture    • Diverticulitis of large intestine with perforation without abscess or bleeding   • Anxiety     Advance Care Planning   Advance Care Planning     Advance Directive is not on file.  ACP discussion was held with the patient during this visit. Patient has an advance directive (not in EMR), copy requested.       Objective   Vitals:    05/01/23 1009   BP: 130/84   Pulse: 97   Temp: 97.5 °F (36.4 °C)   SpO2: 99%   Weight: 74.8 kg (165 lb)   Height: 165.1 cm (65\")     Estimated body mass index is 27.46 kg/m² as calculated from the following:    Height as of this encounter: 165.1 cm (65\").    Weight as of this encounter: 74.8 kg (165 lb).    BMI is >= 25 and <30. (Overweight) The following options were offered after discussion;: exercise counseling/recommendations      Does the patient have evidence of cognitive impairment?   No         Procedures       HEALTH RISK ASSESSMENT    Smoking Status:  Social History     Tobacco Use   Smoking Status Never   Smokeless Tobacco Never     Alcohol Consumption:  Social History     Substance and Sexual Activity   Alcohol Use Yes   • Alcohol/week: 6.0 standard drinks   • Types: 6 Glasses of wine per week    Comment: occasionally       Fall Risk Screen:    JAVY Fall Risk Assessment was completed, and patient is at LOW risk for falls.Assessment completed on:5/1/2023    Depression Screen:       5/1/2023    10:12 AM   PHQ-2/PHQ-9 Depression Screening   Little Interest or Pleasure in Doing Things 0-->not at all   Feeling Down, Depressed or Hopeless 0-->not at all "   PHQ-9: Brief Depression Severity Measure Score 0       Health Habits and Functional and Cognitive Screenin/1/2023    10:11 AM   Functional & Cognitive Status   Do you have difficulty preparing food and eating? No   Do you have difficulty bathing yourself, getting dressed or grooming yourself? No   Do you have difficulty using the toilet? No   Do you have difficulty moving around from place to place? No   Do you have trouble with steps or getting out of a bed or a chair? No   Current Diet Well Balanced Diet   Dental Exam Up to date   Eye Exam Up to date   Exercise (times per week) 3 times per week   Current Exercises Include Walking   Do you need help using the phone?  No   Are you deaf or do you have serious difficulty hearing?  No   Do you need help with transportation? No   Do you need help shopping? No   Do you need help preparing meals?  No   Do you need help with housework?  No   Do you need help with laundry? No   Do you need help taking your medications? No   Do you need help managing money? No   Do you ever drive or ride in a car without wearing a seat belt? No   Have you felt unusual stress, anger or loneliness in the last month? No   Who do you live with? Spouse   If you need help, do you have trouble finding someone available to you? No   Have you been bothered in the last four weeks by sexual problems? No   Do you have difficulty concentrating, remembering or making decisions? No       Visual Acuity:    Vision Screening    Right eye Left eye Both eyes   Without correction      With correction 20/20 20/25 20/20       Age-appropriate Screening Schedule:  Refer to the list below for future screening recommendations based on patient's age, sex and/or medical conditions. Orders for these recommended tests are listed in the plan section. The patient has been provided with a written plan.    Health Maintenance   Topic Date Due   • ZOSTER VACCINE (1 of 2) Never done   • COVID-19 Vaccine (3 - Booster  "for Pfizer series) 06/01/2021   • DXA SCAN  08/19/2022   • INFLUENZA VACCINE  08/01/2023   • MAMMOGRAM  03/17/2024   • PAP SMEAR  04/12/2024   • ANNUAL WELLNESS VISIT  05/01/2024   • TDAP/TD VACCINES (3 - Td or Tdap) 01/03/2025   • COLORECTAL CANCER SCREENING  12/28/2027   • HEPATITIS C SCREENING  Completed   • Pneumococcal Vaccine 65+  Completed        CMS Preventative Services Quick Reference  Risk Factors Identified During Encounter    Immunizations Discussed/Encouraged: Prevnar 20 (Pneumococcal 20-valent conjugate)  The above risks/problems have been discussed with the patient.  Pertinent information has been shared with the patient in the After Visit Summary.    Follow Up:   Initial Medicare Visit in one year    An After Visit Summary and PPPS were made available to the patient.      Additional E&M Note during same encounter follows:  Patient has multiple medical problems which are significant and separately identifiable that require additional work above and beyond the Medicare Wellness Visit.      Chief Complaint  Welcome To Medicare and Anxiety    Subjective        HPI  Kelly Strickland is also being seen today for Anxiety is controlled with Zoloft and Wellbutrin. No CP or SOA.     Review of Systems   Constitutional: Negative for fatigue and fever.   Respiratory: Negative for cough and shortness of breath.    Cardiovascular: Negative for chest pain and leg swelling.   Gastrointestinal: Negative for diarrhea, nausea and vomiting.   Neurological: Negative for weakness, numbness and confusion.   Psychiatric/Behavioral: The patient is not nervous/anxious.        Objective   Vital Signs:  /84   Pulse 97   Temp 97.5 °F (36.4 °C)   Ht 165.1 cm (65\")   Wt 74.8 kg (165 lb)   SpO2 99%   BMI 27.46 kg/m²     Physical Exam  Vitals reviewed.   Constitutional:       Comments: overweight   Cardiovascular:      Rate and Rhythm: Normal rate and regular rhythm.      Heart sounds: No murmur heard.  Pulmonary:      " Effort: No respiratory distress.      Breath sounds: No wheezing.   Neurological:      General: No focal deficit present.      Mental Status: She is alert and oriented to person, place, and time.                      Assessment and Plan   Diagnoses and all orders for this visit:    1. Welcome to Medicare preventive visit (Primary)  -     CBC & Differential; Future    2. Screening cholesterol level  -     Lipid Panel; Future    3. Thyroid disorder screen  -     TSH; Future    4. Anxiety  -     buPROPion XL (WELLBUTRIN XL) 150 MG 24 hr tablet; Take 1 tablet by mouth Daily.  Dispense: 90 tablet; Refill: 3  -     sertraline (ZOLOFT) 100 MG tablet; Take 1 tablet by mouth Daily.  Dispense: 90 tablet; Refill: 3  Continue Wellbutrin Xl 150 mg po qd and Zoloft 100 mg po qd  5. Postmenopause  -     DEXA Bone Density Axial; Future  6. Overweight  BMI is >= 25 and <30. (Overweight) The following options were offered after discussion;: exercise counseling/recommendations           Follow Up   Return in about 6 months (around 11/1/2023).  Patient was given instructions and counseling regarding her condition or for health maintenance advice. Please see specific information pulled into the AVS if appropriate.

## 2023-05-02 DIAGNOSIS — R79.89 ABNORMAL CBC: Primary | ICD-10-CM

## 2023-05-02 LAB
BASOPHILS # BLD AUTO: 0.02 10*3/MM3 (ref 0–0.2)
BASOPHILS NFR BLD AUTO: 0.2 % (ref 0–1.5)
CHOLEST SERPL-MCNC: 204 MG/DL (ref 0–200)
DEPRECATED RDW RBC AUTO: 42.9 FL (ref 37–54)
EOSINOPHIL # BLD AUTO: 0.04 10*3/MM3 (ref 0–0.4)
EOSINOPHIL NFR BLD AUTO: 0.4 % (ref 0.3–6.2)
ERYTHROCYTE [DISTWIDTH] IN BLOOD BY AUTOMATED COUNT: 12.5 % (ref 12.3–15.4)
HCT VFR BLD AUTO: 40.5 % (ref 34–46.6)
HDLC SERPL-MCNC: 101 MG/DL (ref 40–60)
HGB BLD-MCNC: 13.5 G/DL (ref 12–15.9)
IMM GRANULOCYTES # BLD AUTO: 0.04 10*3/MM3 (ref 0–0.05)
IMM GRANULOCYTES NFR BLD AUTO: 0.4 % (ref 0–0.5)
LDLC SERPL CALC-MCNC: 91 MG/DL (ref 0–100)
LDLC/HDLC SERPL: 0.89 {RATIO}
LYMPHOCYTES # BLD AUTO: 1.33 10*3/MM3 (ref 0.7–3.1)
LYMPHOCYTES NFR BLD AUTO: 11.7 % (ref 19.6–45.3)
MCH RBC QN AUTO: 31.5 PG (ref 26.6–33)
MCHC RBC AUTO-ENTMCNC: 33.3 G/DL (ref 31.5–35.7)
MCV RBC AUTO: 94.4 FL (ref 79–97)
MONOCYTES # BLD AUTO: 0.88 10*3/MM3 (ref 0.1–0.9)
MONOCYTES NFR BLD AUTO: 7.8 % (ref 5–12)
NEUTROPHILS NFR BLD AUTO: 79.5 % (ref 42.7–76)
NEUTROPHILS NFR BLD AUTO: 9.01 10*3/MM3 (ref 1.7–7)
NRBC BLD AUTO-RTO: 0 /100 WBC (ref 0–0.2)
PLATELET # BLD AUTO: 251 10*3/MM3 (ref 140–450)
PMV BLD AUTO: 9.9 FL (ref 6–12)
RBC # BLD AUTO: 4.29 10*6/MM3 (ref 3.77–5.28)
TRIGL SERPL-MCNC: 67 MG/DL (ref 0–150)
TSH SERPL DL<=0.05 MIU/L-ACNC: 2.65 UIU/ML (ref 0.27–4.2)
VLDLC SERPL-MCNC: 12 MG/DL (ref 5–40)
WBC NRBC COR # BLD: 11.32 10*3/MM3 (ref 3.4–10.8)

## 2023-05-05 ENCOUNTER — TRANSCRIBE ORDERS (OUTPATIENT)
Dept: ADMINISTRATIVE | Facility: HOSPITAL | Age: 68
End: 2023-05-05
Payer: MEDICARE

## 2023-05-05 DIAGNOSIS — Z12.31 VISIT FOR SCREENING MAMMOGRAM: Primary | ICD-10-CM

## 2023-06-08 ENCOUNTER — LAB (OUTPATIENT)
Dept: LAB | Facility: HOSPITAL | Age: 68
End: 2023-06-08
Payer: MEDICARE

## 2023-06-08 DIAGNOSIS — R79.89 ABNORMAL CBC: ICD-10-CM

## 2023-06-08 LAB
BASOPHILS # BLD AUTO: 0.02 10*3/MM3 (ref 0–0.2)
BASOPHILS NFR BLD AUTO: 0.4 % (ref 0–1.5)
DEPRECATED RDW RBC AUTO: 39.8 FL (ref 37–54)
EOSINOPHIL # BLD AUTO: 0.16 10*3/MM3 (ref 0–0.4)
EOSINOPHIL NFR BLD AUTO: 3.1 % (ref 0.3–6.2)
ERYTHROCYTE [DISTWIDTH] IN BLOOD BY AUTOMATED COUNT: 12.1 % (ref 12.3–15.4)
HCT VFR BLD AUTO: 39.6 % (ref 34–46.6)
HGB BLD-MCNC: 13.7 G/DL (ref 12–15.9)
IMM GRANULOCYTES # BLD AUTO: 0.02 10*3/MM3 (ref 0–0.05)
IMM GRANULOCYTES NFR BLD AUTO: 0.4 % (ref 0–0.5)
LYMPHOCYTES # BLD AUTO: 1.46 10*3/MM3 (ref 0.7–3.1)
LYMPHOCYTES NFR BLD AUTO: 28.3 % (ref 19.6–45.3)
MCH RBC QN AUTO: 31.4 PG (ref 26.6–33)
MCHC RBC AUTO-ENTMCNC: 34.6 G/DL (ref 31.5–35.7)
MCV RBC AUTO: 90.8 FL (ref 79–97)
MONOCYTES # BLD AUTO: 0.46 10*3/MM3 (ref 0.1–0.9)
MONOCYTES NFR BLD AUTO: 8.9 % (ref 5–12)
NEUTROPHILS NFR BLD AUTO: 3.03 10*3/MM3 (ref 1.7–7)
NEUTROPHILS NFR BLD AUTO: 58.9 % (ref 42.7–76)
NRBC BLD AUTO-RTO: 0 /100 WBC (ref 0–0.2)
PLATELET # BLD AUTO: 246 10*3/MM3 (ref 140–450)
PMV BLD AUTO: 9.7 FL (ref 6–12)
RBC # BLD AUTO: 4.36 10*6/MM3 (ref 3.77–5.28)
WBC NRBC COR # BLD: 5.15 10*3/MM3 (ref 3.4–10.8)

## 2023-06-08 PROCEDURE — 85025 COMPLETE CBC W/AUTO DIFF WBC: CPT

## 2023-08-28 ENCOUNTER — OFFICE VISIT (OUTPATIENT)
Dept: INTERNAL MEDICINE | Facility: CLINIC | Age: 68
End: 2023-08-28
Payer: MEDICARE

## 2023-08-28 VITALS
SYSTOLIC BLOOD PRESSURE: 124 MMHG | HEART RATE: 75 BPM | HEIGHT: 65 IN | TEMPERATURE: 98.1 F | BODY MASS INDEX: 28.82 KG/M2 | WEIGHT: 173 LBS | OXYGEN SATURATION: 95 % | DIASTOLIC BLOOD PRESSURE: 88 MMHG

## 2023-08-28 DIAGNOSIS — H69.83 DYSFUNCTION OF BOTH EUSTACHIAN TUBES: ICD-10-CM

## 2023-08-28 DIAGNOSIS — H92.02 OTALGIA, LEFT EAR: Primary | ICD-10-CM

## 2023-08-28 DIAGNOSIS — H61.22 IMPACTED CERUMEN, LEFT EAR: ICD-10-CM

## 2023-08-28 PROCEDURE — 69209 REMOVE IMPACTED EAR WAX UNI: CPT | Performed by: NURSE PRACTITIONER

## 2023-08-28 PROCEDURE — 99213 OFFICE O/P EST LOW 20 MIN: CPT | Performed by: NURSE PRACTITIONER

## 2023-08-28 RX ORDER — FLUTICASONE PROPIONATE 50 MCG
SPRAY, SUSPENSION (ML) NASAL
Qty: 48 G | OUTPATIENT
Start: 2023-08-28

## 2023-08-28 RX ORDER — CIPROFLOXACIN AND DEXAMETHASONE 3; 1 MG/ML; MG/ML
4 SUSPENSION/ DROPS AURICULAR (OTIC) 2 TIMES DAILY
Qty: 7.5 ML | Refills: 1 | Status: SHIPPED | OUTPATIENT
Start: 2023-08-28 | End: 2023-08-30 | Stop reason: CLARIF

## 2023-08-28 RX ORDER — FLUTICASONE PROPIONATE 50 MCG
2 SPRAY, SUSPENSION (ML) NASAL DAILY
Qty: 16 G | Refills: 1 | Status: SHIPPED | OUTPATIENT
Start: 2023-08-28

## 2023-08-28 NOTE — PROGRESS NOTES
Office Note     Name: Kelly Strickland    : 1955     MRN: [id number]     Chief Complaint  Earache (Left ear)    Subjective     History of Present Illness:  Kelly Strickland is a 68 y.o. female who presents today for complaints of left ear pain.  Patient reports onset of several days ago.  She reports the pain is present to the ear and to the left side of her face and temporal area as well.  She denies any sore throat.  She denies any cough.  She denies any known fever.  She denies any associated symptoms.  She has not been taking any over-the-counter medications for her symptoms.  She denies further complaints or concerns at this time.  She follows with Dr. Antonio for chronic conditions.    Review of Systems   Constitutional: Negative.    HENT:  Positive for ear pain. Negative for ear discharge and sore throat.    Respiratory: Negative.     Cardiovascular: Negative.    Neurological:  Negative for headaches.        Past Medical History:   Diagnosis Date    Acid reflux     Ankle sprain     Anxiety     Basal cell carcinoma of skin     Bursitis of hip     Diverticulosis     Fracture of ankle     Fracture, foot     Herpes zoster     Hip arthrosis     Humeral fracture     Migraine     Nephrolithiasis     Osteopenia     Tennis elbow     Thoracic vertebral fracture     Vitamin D deficiency        Past Surgical History:   Procedure Laterality Date    BREAST BIOPSY Left     CHOLECYSTECTOMY      COLONOSCOPY      CYSTOSCOPY W/ LASER LITHOTRIPSY      HERNIA REPAIR      MOHS SURGERY      ORIF HUMERAL SHAFT FRACTURE      [place] OPTX PROX HUMERAL FX W/INT FIXJ RPR TUBEROSITY Right 2017    Procedure: OPEN REDUCTION INTERNAL FIXATION RIGHT HUMERUS;  Surgeon: Ryan Jimenes MD;  Location: [facility];  Service: Orthopedics    TONSILLECTOMY         Social History     Socioeconomic History    Marital status:    Tobacco Use    Smoking status: Never    Smokeless tobacco: Never   Vaping Use    Vaping Use:  "Never used   Substance and Sexual Activity    Alcohol use: Yes     Alcohol/week: 6.0 standard drinks     Types: 6 Glasses of wine per week     Comment: occasionally    Drug use: No    Sexual activity: Yes     Partners: Male     Birth control/protection: None         Current Outpatient Medications:     buPROPion XL (WELLBUTRIN XL) 150 MG 24 hr tablet, Take 1 tablet by mouth Daily., Disp: 90 tablet, Rfl: 3    sertraline (ZOLOFT) 100 MG tablet, Take 1 tablet by mouth Daily., Disp: 90 tablet, Rfl: 3    ciprofloxacin-dexAMETHasone (Ciprodex) 0.3-0.1 % otic suspension, Administer 4 drops into the left ear 2 (Two) Times a Day., Disp: 7.5 mL, Rfl: 1    fluticasone (FLONASE) 50 MCG/ACT nasal spray, 2 sprays into the nostril(s) as directed by provider Daily., Disp: 16 g, Rfl: 1  No current facility-administered medications for this visit.    Facility-Administered Medications Ordered in Other Visits:     bupivacaine PF (MARCAINE) 0.25 % injection, , , PRN, Naveen Ruvalcaba CRNA, 15 mL at 03/14/17 1200    buprenorphine (BUPRENEX) injection, , , PRN, Naveen Ruvalcaba CRNA, 0.3 mg at 03/14/17 1200    dexamethasone sodium phosphate injection, , Peripheral Nerve, PRN, Naveen Ruvalcaba CRNA, 2 mg at 03/14/17 1200    Objective     Vital Signs  /88   Pulse 75   Temp 98.1 øF (36.7 øC)   Ht 165.1 cm (65\")   Wt 78.5 kg (173 lb)   SpO2 95%   BMI 28.79 kg/mý   Estimated body mass index is 28.79 kg/mý as calculated from the following:    Height as of this encounter: 165.1 cm (65\").    Weight as of this encounter: 78.5 kg (173 lb).           Physical Exam  Constitutional:       General: She is not in acute distress.     Appearance: Normal appearance. She is not ill-appearing.   HENT:      Head: Normocephalic and atraumatic.      Right Ear: Tympanic membrane, ear canal and external ear normal.      Left Ear: Ear canal and external ear normal. There is impacted cerumen.      Ears:      Comments: Erythema noted to canal to left ear, " clear effusion noted bilaterally     Nose: Nose normal.   Eyes:      Extraocular Movements: Extraocular movements intact.      Conjunctiva/sclera: Conjunctivae normal.      Pupils: Pupils are equal, round, and reactive to light.   Cardiovascular:      Rate and Rhythm: Normal rate.   Pulmonary:      Effort: Pulmonary effort is normal. No respiratory distress.   Musculoskeletal:         General: Normal range of motion.      Cervical back: Neck supple.   Skin:     General: Skin is warm and dry.   Neurological:      General: No focal deficit present.      Mental Status: She is alert and oriented to person, place, and time. Mental status is at baseline.   Psychiatric:         Mood and Affect: Mood normal.         Behavior: Behavior normal.         Thought Content: Thought content normal.         Judgment: Judgment normal.     Ear Cerumen Removal    Date/Time: 8/28/2023 2:26 PM  Performed by: Andra Yuan MA  Authorized by: Radha Chandler APRN   Consent: Verbal consent obtained.  Consent given by: patient  Patient understanding: patient states understanding of the procedure being performed  Patient identity confirmed: verbally with patient    Anesthesia:  Local Anesthetic: none  Location details: left ear  Patient tolerance: patient tolerated the procedure well with no immediate complications  Procedure type: irrigation   Sedation:  Patient sedated: no            Assessment and Plan     Diagnoses and all orders for this visit:    1. Otalgia, left ear (Primary)  -     ciprofloxacin-dexAMETHasone (Ciprodex) 0.3-0.1 % otic suspension; Administer 4 drops into the left ear 2 (Two) Times a Day.  Dispense: 7.5 mL; Refill: 1    2. Impacted cerumen, left ear  -     Ear Cerumen Removal    3. Dysfunction of both eustachian tubes  -     fluticasone (FLONASE) 50 MCG/ACT nasal spray; 2 sprays into the nostril(s) as directed by provider Daily.  Dispense: 16 g; Refill: 1        Follow Up  Return if symptoms worsen or fail to improve,  for Follow up with Dr. Antonio.    WILFRID Hernandez    Part of this note may be an electronic transcription/translation of spoken language to printed text using the Dragon Dictation System.

## 2023-08-29 ENCOUNTER — TELEPHONE (OUTPATIENT)
Dept: INTERNAL MEDICINE | Facility: CLINIC | Age: 68
End: 2023-08-29
Payer: MEDICARE

## 2023-08-30 DIAGNOSIS — H92.02 OTALGIA, LEFT EAR: Primary | ICD-10-CM

## 2023-08-30 RX ORDER — TOBRAMYCIN AND DEXAMETHASONE 3; 1 MG/ML; MG/ML
1 SUSPENSION/ DROPS OPHTHALMIC 2 TIMES DAILY
Qty: 10 ML | Refills: 0
Start: 2023-08-30

## 2023-09-01 ENCOUNTER — TELEPHONE (OUTPATIENT)
Dept: INTERNAL MEDICINE | Facility: CLINIC | Age: 68
End: 2023-09-01

## 2023-09-01 NOTE — TELEPHONE ENCOUNTER
Caller: Kelly Strickland    Relationship: Self    Best call back number: [phone]    What medications are you currently taking:   Current Outpatient Medications on File Prior to Visit   Medication Sig Dispense Refill    buPROPion XL (WELLBUTRIN XL) 150 MG 24 hr tablet Take 1 tablet by mouth Daily. 90 tablet 3    fluticasone (FLONASE) 50 MCG/ACT nasal spray 2 sprays into the nostril(s) as directed by provider Daily. 16 g 1    sertraline (ZOLOFT) 100 MG tablet Take 1 tablet by mouth Daily. 90 tablet 3    tobramycin-dexAMETHasone (TOBRADEX) 0.3-0.1 % Administer 1 drop into the left ear 2 (Two) Times a Day. 10 mL 0     Current Facility-Administered Medications on File Prior to Visit   Medication Dose Route Frequency Provider Last Rate Last Admin    bupivacaine PF (MARCAINE) 0.25 % injection    PRN Naveen Ruvalcaba CRNA   15 mL at 03/14/17 1200    buprenorphine (BUPRENEX) injection    PRN Naveen Ruvalcaba CRNA   0.3 mg at 03/14/17 1200    dexamethasone sodium phosphate injection   Peripheral Nerve PRN Naveen Ruvalcaba CRNA   2 mg at 03/14/17 1200            Which medication are you concerned about: TOBRAMYCIN-DEXAMETHASONE     Who prescribed you this medication: DR CHAN    What are your concerns: THIS MEDICATION NEEDS A PRIOR AUTHORIZATION. PLEASE ADVISE

## 2023-09-07 ENCOUNTER — TELEPHONE (OUTPATIENT)
Dept: INTERNAL MEDICINE | Facility: CLINIC | Age: 68
End: 2023-09-07
Payer: MEDICARE

## 2023-09-07 DIAGNOSIS — H92.02 OTALGIA, LEFT EAR: ICD-10-CM

## 2023-09-07 RX ORDER — TOBRAMYCIN AND DEXAMETHASONE 3; 1 MG/ML; MG/ML
1 SUSPENSION/ DROPS OPHTHALMIC 2 TIMES DAILY
Qty: 10 ML | Refills: 0 | Status: SHIPPED | OUTPATIENT
Start: 2023-09-07

## 2023-09-07 NOTE — TELEPHONE ENCOUNTER
PLEASE CHECK THE STATUS OF THE Rx. PATIENT STATES THAT THERE IS NOTHING READY FOR HER AT THE PHARMACY. PLEASE CALL PATIENT WITH STATUS. [phone]

## 2023-09-20 DIAGNOSIS — F41.9 ANXIETY: ICD-10-CM

## 2023-09-20 RX ORDER — BUPROPION HYDROCHLORIDE 150 MG/1
TABLET ORAL
Qty: 90 TABLET | Refills: 3 | OUTPATIENT
Start: 2023-09-20

## 2023-11-02 ENCOUNTER — OFFICE VISIT (OUTPATIENT)
Dept: INTERNAL MEDICINE | Facility: CLINIC | Age: 68
End: 2023-11-02
Payer: MEDICARE

## 2023-11-02 VITALS
SYSTOLIC BLOOD PRESSURE: 138 MMHG | BODY MASS INDEX: 28.32 KG/M2 | HEART RATE: 78 BPM | HEIGHT: 65 IN | WEIGHT: 170 LBS | DIASTOLIC BLOOD PRESSURE: 88 MMHG | OXYGEN SATURATION: 99 %

## 2023-11-02 DIAGNOSIS — R03.0 ELEVATED BLOOD-PRESSURE READING WITHOUT DIAGNOSIS OF HYPERTENSION: ICD-10-CM

## 2023-11-02 DIAGNOSIS — Z23 NEED FOR INFLUENZA VACCINATION: ICD-10-CM

## 2023-11-02 DIAGNOSIS — F41.9 ANXIETY: Primary | ICD-10-CM

## 2023-11-02 PROBLEM — M81.0 OSTEOPOROSIS: Status: ACTIVE | Noted: 2023-09-05

## 2023-11-02 PROBLEM — K57.20 DIVERTICULITIS OF COLON WITH PERFORATION: Status: ACTIVE | Noted: 2022-11-21

## 2023-11-02 PROBLEM — E55.9 VITAMIN D DEFICIENCY: Status: ACTIVE | Noted: 2023-10-17

## 2023-11-02 PROBLEM — D72.823 NEUTROPHILIC LEUKEMOID REACTION: Status: ACTIVE | Noted: 2022-11-22

## 2023-11-02 PROBLEM — I10 BENIGN ESSENTIAL HYPERTENSION: Status: ACTIVE | Noted: 2022-11-22

## 2023-11-02 PROBLEM — R10.9 RIGHT FLANK PAIN: Status: ACTIVE | Noted: 2017-12-04

## 2023-11-02 PROCEDURE — G0008 ADMIN INFLUENZA VIRUS VAC: HCPCS | Performed by: INTERNAL MEDICINE

## 2023-11-02 PROCEDURE — 99213 OFFICE O/P EST LOW 20 MIN: CPT | Performed by: INTERNAL MEDICINE

## 2023-11-02 PROCEDURE — 3075F SYST BP GE 130 - 139MM HG: CPT | Performed by: INTERNAL MEDICINE

## 2023-11-02 PROCEDURE — 3079F DIAST BP 80-89 MM HG: CPT | Performed by: INTERNAL MEDICINE

## 2023-11-02 PROCEDURE — 90662 IIV NO PRSV INCREASED AG IM: CPT | Performed by: INTERNAL MEDICINE

## 2023-11-02 NOTE — PROGRESS NOTES
"Subjective   Kelly Strickland is a 68 y.o. female.   Chief Complaint   Patient presents with    Anxiety       History of Present Illness   Her for f/u on anxiety. Anxiety controlled with  Wellbutrin and Zoloft. No CP or SOA No HA.   The following portions of the patient's history were reviewed and updated as appropriate: allergies, current medications, past family history, past medical history, past social history, past surgical history, and problem list.    Review of Systems   Constitutional:  Negative for diaphoresis and fatigue.   Respiratory:  Negative for cough and shortness of breath.    Cardiovascular:  Negative for chest pain and leg swelling.   Gastrointestinal:  Negative for abdominal pain.   Neurological:  Negative for weakness.   Psychiatric/Behavioral:  Negative for confusion.      /88   Pulse 78   Ht 165.1 cm (65\")   Wt 77.1 kg (170 lb)   SpO2 99%   BMI 28.29 kg/m²     Objective   Physical Exam  Vitals reviewed.   Cardiovascular:      Rate and Rhythm: Normal rate and regular rhythm.   Pulmonary:      Effort: No respiratory distress.      Breath sounds: No wheezing.   Neurological:      Mental Status: She is alert and oriented to person, place, and time.       Assessment & Plan   Diagnoses and all orders for this visit:    1. Anxiety (Primary)  Continue  Wellbutrin  mg po qd and Zoloft 100 mg po qd  2. Elevated blood-pressure reading without diagnosis of hypertension  She states it is up today because she lost her purse. She is going to check it daily for a week and send me numbers.   3. Need for influenza vaccination  -     Fluzone High-Dose 65+yrs                 "

## 2023-11-24 ENCOUNTER — HOSPITAL ENCOUNTER (EMERGENCY)
Facility: HOSPITAL | Age: 68
Discharge: HOME | End: 2023-11-24
Attending: EMERGENCY MEDICINE
Payer: MEDICARE

## 2023-11-24 ENCOUNTER — APPOINTMENT (OUTPATIENT)
Dept: CT IMAGING | Facility: HOSPITAL | Age: 68
End: 2023-11-24
Payer: MEDICARE

## 2023-11-24 VITALS
DIASTOLIC BLOOD PRESSURE: 83 MMHG | SYSTOLIC BLOOD PRESSURE: 142 MMHG | OXYGEN SATURATION: 97 % | WEIGHT: 170 LBS | BODY MASS INDEX: 28.32 KG/M2 | HEART RATE: 84 BPM | HEIGHT: 65 IN | TEMPERATURE: 98.2 F | RESPIRATION RATE: 16 BRPM

## 2023-11-24 DIAGNOSIS — K22.89 THICKENING OF ESOPHAGUS: ICD-10-CM

## 2023-11-24 DIAGNOSIS — N30.00 ACUTE CYSTITIS WITHOUT HEMATURIA: Primary | ICD-10-CM

## 2023-11-24 DIAGNOSIS — R10.9 BILATERAL FLANK PAIN: ICD-10-CM

## 2023-11-24 DIAGNOSIS — R79.89 ELEVATED LIVER FUNCTION TESTS: ICD-10-CM

## 2023-11-24 LAB
ALBUMIN SERPL-MCNC: 4.4 G/DL (ref 3.5–5.2)
ALBUMIN/GLOB SERPL: 1.5 G/DL
ALP SERPL-CCNC: 126 U/L (ref 39–117)
ALT SERPL W P-5'-P-CCNC: 39 U/L (ref 1–33)
ANION GAP SERPL CALCULATED.3IONS-SCNC: 6 MMOL/L (ref 5–15)
AST SERPL-CCNC: 42 U/L (ref 1–32)
BACTERIA UR QL AUTO: ABNORMAL /HPF
BASOPHILS # BLD AUTO: 0.01 10*3/MM3 (ref 0–0.2)
BASOPHILS NFR BLD AUTO: 0.2 % (ref 0–1.5)
BILIRUB SERPL-MCNC: 0.9 MG/DL (ref 0–1.2)
BILIRUB UR QL STRIP: NEGATIVE
BUN SERPL-MCNC: 16 MG/DL (ref 8–23)
BUN/CREAT SERPL: 23.2 (ref 7–25)
CALCIUM SPEC-SCNC: 9 MG/DL (ref 8.6–10.5)
CHLORIDE SERPL-SCNC: 108 MMOL/L (ref 98–107)
CLARITY UR: CLEAR
CO2 SERPL-SCNC: 29 MMOL/L (ref 22–29)
COLOR UR: YELLOW
CREAT SERPL-MCNC: 0.69 MG/DL (ref 0.57–1)
D-LACTATE SERPL-SCNC: 1.1 MMOL/L (ref 0.5–2)
DEPRECATED RDW RBC AUTO: 45.1 FL (ref 37–54)
EGFRCR SERPLBLD CKD-EPI 2021: 94.7 ML/MIN/1.73
EOSINOPHIL # BLD AUTO: 0.18 10*3/MM3 (ref 0–0.4)
EOSINOPHIL NFR BLD AUTO: 2.9 % (ref 0.3–6.2)
ERYTHROCYTE [DISTWIDTH] IN BLOOD BY AUTOMATED COUNT: 12.6 % (ref 12.3–15.4)
GLOBULIN UR ELPH-MCNC: 2.9 GM/DL
GLUCOSE SERPL-MCNC: 92 MG/DL (ref 65–99)
GLUCOSE UR STRIP-MCNC: NEGATIVE MG/DL
HCT VFR BLD AUTO: 41.7 % (ref 34–46.6)
HGB BLD-MCNC: 13.5 G/DL (ref 12–15.9)
HGB UR QL STRIP.AUTO: NEGATIVE
HOLD SPECIMEN: NORMAL
HYALINE CASTS UR QL AUTO: ABNORMAL /LPF
IMM GRANULOCYTES # BLD AUTO: 0.02 10*3/MM3 (ref 0–0.05)
IMM GRANULOCYTES NFR BLD AUTO: 0.3 % (ref 0–0.5)
KETONES UR QL STRIP: NEGATIVE
LEUKOCYTE ESTERASE UR QL STRIP.AUTO: ABNORMAL
LIPASE SERPL-CCNC: 49 U/L (ref 13–60)
LYMPHOCYTES # BLD AUTO: 1.32 10*3/MM3 (ref 0.7–3.1)
LYMPHOCYTES NFR BLD AUTO: 20.9 % (ref 19.6–45.3)
MCH RBC QN AUTO: 31.4 PG (ref 26.6–33)
MCHC RBC AUTO-ENTMCNC: 32.4 G/DL (ref 31.5–35.7)
MCV RBC AUTO: 97 FL (ref 79–97)
MONOCYTES # BLD AUTO: 0.49 10*3/MM3 (ref 0.1–0.9)
MONOCYTES NFR BLD AUTO: 7.8 % (ref 5–12)
NEUTROPHILS NFR BLD AUTO: 4.29 10*3/MM3 (ref 1.7–7)
NEUTROPHILS NFR BLD AUTO: 67.9 % (ref 42.7–76)
NITRITE UR QL STRIP: NEGATIVE
NRBC BLD AUTO-RTO: 0 /100 WBC (ref 0–0.2)
PH UR STRIP.AUTO: 5.5 [PH] (ref 5–8)
PLATELET # BLD AUTO: 240 10*3/MM3 (ref 140–450)
PMV BLD AUTO: 9.2 FL (ref 6–12)
POTASSIUM SERPL-SCNC: 4.3 MMOL/L (ref 3.5–5.2)
PROT SERPL-MCNC: 7.3 G/DL (ref 6–8.5)
PROT UR QL STRIP: NEGATIVE
RBC # BLD AUTO: 4.3 10*6/MM3 (ref 3.77–5.28)
RBC # UR STRIP: ABNORMAL /HPF
REF LAB TEST METHOD: ABNORMAL
SODIUM SERPL-SCNC: 143 MMOL/L (ref 136–145)
SP GR UR STRIP: 1.03 (ref 1–1.03)
SQUAMOUS #/AREA URNS HPF: ABNORMAL /HPF
UROBILINOGEN UR QL STRIP: ABNORMAL
WBC # UR STRIP: ABNORMAL /HPF
WBC NRBC COR # BLD AUTO: 6.31 10*3/MM3 (ref 3.4–10.8)
WHOLE BLOOD HOLD COAG: NORMAL
WHOLE BLOOD HOLD SPECIMEN: NORMAL

## 2023-11-24 PROCEDURE — 87086 URINE CULTURE/COLONY COUNT: CPT | Performed by: PHYSICIAN ASSISTANT

## 2023-11-24 PROCEDURE — 96374 THER/PROPH/DIAG INJ IV PUSH: CPT

## 2023-11-24 PROCEDURE — 25510000001 IOPAMIDOL 61 % SOLUTION: Performed by: EMERGENCY MEDICINE

## 2023-11-24 PROCEDURE — 80053 COMPREHEN METABOLIC PANEL: CPT | Performed by: EMERGENCY MEDICINE

## 2023-11-24 PROCEDURE — 74177 CT ABD & PELVIS W/CONTRAST: CPT

## 2023-11-24 PROCEDURE — 83690 ASSAY OF LIPASE: CPT | Performed by: EMERGENCY MEDICINE

## 2023-11-24 PROCEDURE — 83605 ASSAY OF LACTIC ACID: CPT | Performed by: EMERGENCY MEDICINE

## 2023-11-24 PROCEDURE — 85025 COMPLETE CBC W/AUTO DIFF WBC: CPT | Performed by: EMERGENCY MEDICINE

## 2023-11-24 PROCEDURE — 99285 EMERGENCY DEPT VISIT HI MDM: CPT

## 2023-11-24 PROCEDURE — 25010000002 KETOROLAC TROMETHAMINE PER 15 MG: Performed by: PHYSICIAN ASSISTANT

## 2023-11-24 PROCEDURE — 81001 URINALYSIS AUTO W/SCOPE: CPT | Performed by: EMERGENCY MEDICINE

## 2023-11-24 RX ORDER — ONDANSETRON 4 MG/1
4 TABLET, ORALLY DISINTEGRATING ORAL EVERY 6 HOURS PRN
Qty: 15 TABLET | Refills: 0 | Status: SHIPPED | OUTPATIENT
Start: 2023-11-24

## 2023-11-24 RX ORDER — SODIUM CHLORIDE 9 MG/ML
10 INJECTION INTRAVENOUS AS NEEDED
Status: DISCONTINUED | OUTPATIENT
Start: 2023-11-24 | End: 2023-11-24 | Stop reason: HOSPADM

## 2023-11-24 RX ORDER — KETOROLAC TROMETHAMINE 10 MG/1
10 TABLET, FILM COATED ORAL EVERY 6 HOURS PRN
Qty: 12 TABLET | Refills: 0 | Status: SHIPPED | OUTPATIENT
Start: 2023-11-24

## 2023-11-24 RX ORDER — KETOROLAC TROMETHAMINE 15 MG/ML
15 INJECTION, SOLUTION INTRAMUSCULAR; INTRAVENOUS ONCE
Status: COMPLETED | OUTPATIENT
Start: 2023-11-24 | End: 2023-11-24

## 2023-11-24 RX ORDER — CEFDINIR 300 MG/1
300 CAPSULE ORAL 2 TIMES DAILY
Qty: 20 CAPSULE | Refills: 0 | Status: SHIPPED | OUTPATIENT
Start: 2023-11-24 | End: 2023-12-04

## 2023-11-24 RX ADMIN — KETOROLAC TROMETHAMINE 15 MG: 15 INJECTION, SOLUTION INTRAMUSCULAR; INTRAVENOUS at 09:50

## 2023-11-24 RX ADMIN — IOPAMIDOL 85 ML: 612 INJECTION, SOLUTION INTRAVENOUS at 09:06

## 2023-11-24 NOTE — ED PROVIDER NOTES
Subjective   History of Present Illness  Pt is a 67 yo female presenting to ED with complaints of flank pain. PMHx significant for Anxiety, Diverticulosis, Kidney stones, Skin cancer and Migraines. Pt reports bilateral flank pain for the past 3 days with pain greatest on right side. She denies pain radiating into her abdomen. She has had nausea but denies V/D or urinary sx. She does have hx of kidney stones and is followed by Dr. Mathew. She denies fever, chills, CP, SOB or cough. She uses ETOH and denies tobacco or drug use.      History provided by:  Medical records, patient and relative      Review of Systems   Constitutional:  Negative for chills and fever.   HENT:  Negative for congestion.    Eyes:  Negative for visual disturbance.   Respiratory:  Negative for cough and shortness of breath.    Cardiovascular:  Negative for chest pain and leg swelling.   Gastrointestinal:  Positive for nausea. Negative for abdominal pain, diarrhea and vomiting.   Genitourinary:  Positive for flank pain. Negative for difficulty urinating, dysuria and hematuria.   Musculoskeletal:  Negative for arthralgias and back pain.   Skin:  Negative for rash and wound.   Neurological:  Negative for dizziness, syncope, speech difficulty, weakness, numbness and headaches.   Psychiatric/Behavioral:  Negative for confusion.    All other systems reviewed and are negative.      Past Medical History:   Diagnosis Date    Acid reflux     Ankle sprain     Anxiety     Basal cell carcinoma of skin     Bursitis of hip     Diverticulosis     Fracture of ankle     Fracture, foot     Herpes zoster     Hip arthrosis     Humeral fracture     Migraine     Nephrolithiasis     Osteopenia     Tennis elbow     Thoracic vertebral fracture     Vitamin D deficiency        No Known Allergies    Past Surgical History:   Procedure Laterality Date    BREAST BIOPSY Left     CHOLECYSTECTOMY      COLONOSCOPY      CYSTOSCOPY W/ LASER LITHOTRIPSY      HERNIA REPAIR       MOHS SURGERY      ORIF HUMERAL SHAFT FRACTURE      [place] OPTX PROX HUMERAL FX W/INT FIXJ RPR TUBEROSITY Right 03/13/2017    Procedure: OPEN REDUCTION INTERNAL FIXATION RIGHT HUMERUS;  Surgeon: Ryan Jimenes MD;  Location: [facility];  Service: Orthopedics    TONSILLECTOMY         Family History   Problem Relation Age of Onset    Stroke Mother     Hypertension Mother     Osteoporosis Mother     Kidney cancer Father     Prostate cancer Father     Diabetes type II Father     Breast cancer Sister 59    Cancer Sister         Breast    Scoliosis Sister     Osteoporosis Maternal Aunt     Breast cancer Maternal Aunt     Colon cancer Maternal Aunt     Ovarian cancer Neg Hx        Social History     Socioeconomic History    Marital status:    Tobacco Use    Smoking status: Never    Smokeless tobacco: Never   Vaping Use    Vaping Use: Never used   Substance and Sexual Activity    Alcohol use: Yes     Alcohol/week: 6.0 standard drinks of alcohol     Types: 6 Glasses of wine per week     Comment: occasionally    Drug use: No    Sexual activity: Yes     Partners: Male     Birth control/protection: None           Objective   Physical Exam  Vitals and nursing note reviewed.   Constitutional:       Appearance: She is well-developed.   HENT:      Head: Atraumatic.      Nose: Nose normal.   Eyes:      General: Lids are normal.      Conjunctiva/sclera: Conjunctivae normal.      Pupils: Pupils are equal, round, and reactive to light.   Cardiovascular:      Rate and Rhythm: Normal rate and regular rhythm.      Heart sounds: Normal heart sounds.   Pulmonary:      Effort: Pulmonary effort is normal.      Breath sounds: Normal breath sounds. No wheezing.   Abdominal:      General: There is no distension.      Palpations: Abdomen is soft.      Tenderness: There is no abdominal tenderness. There is right CVA tenderness and left CVA tenderness. There is no guarding or rebound.   Musculoskeletal:         General: No tenderness. Normal  range of motion.      Cervical back: Normal range of motion and neck supple.   Skin:     General: Skin is warm and dry.      Findings: No erythema or rash.   Neurological:      Mental Status: She is alert and oriented to person, place, and time.      Sensory: No sensory deficit.   Psychiatric:         Speech: Speech normal.         Behavior: Behavior normal.         Procedures           ED Course           Recent Results (from the past 24 hour(s))   Urinalysis With Microscopic If Indicated (No Culture) - Urine, Clean Catch    Collection Time: 11/24/23  8:04 AM    Specimen: Urine, Clean Catch   Result Value Ref Range    Color, UA Yellow Yellow, Straw    Appearance, UA Clear Clear    pH, UA 5.5 5.0 - 8.0    Specific Gravity, UA 1.028 1.001 - 1.030    Glucose, UA Negative Negative    Ketones, UA Negative Negative    Bilirubin, UA Negative Negative    Blood, UA Negative Negative    Protein, UA Negative Negative    Leuk Esterase, UA Moderate (2+) (A) Negative    Nitrite, UA Negative Negative    Urobilinogen, UA 0.2 E.U./dL 0.2 - 1.0 E.U./dL   Urinalysis, Microscopic Only - Urine, Clean Catch    Collection Time: 11/24/23  8:04 AM    Specimen: Urine, Clean Catch   Result Value Ref Range    RBC, UA 0-2 None Seen, 0-2 /HPF    WBC, UA 21-50 (A) None Seen, 0-2 /HPF    Bacteria, UA None Seen None Seen, Trace /HPF    Squamous Epithelial Cells, UA 3-6 (A) None Seen, 0-2 /HPF    Hyaline Casts, UA 0-6 0 - 6 /LPF    Methodology Automated Microscopy    Comprehensive Metabolic Panel    Collection Time: 11/24/23  8:20 AM    Specimen: Blood   Result Value Ref Range    Glucose 92 65 - 99 mg/dL    BUN 16 8 - 23 mg/dL    Creatinine 0.69 0.57 - 1.00 mg/dL    Sodium 143 136 - 145 mmol/L    Potassium 4.3 3.5 - 5.2 mmol/L    Chloride 108 (H) 98 - 107 mmol/L    CO2 29.0 22.0 - 29.0 mmol/L    Calcium 9.0 8.6 - 10.5 mg/dL    Total Protein 7.3 6.0 - 8.5 g/dL    Albumin 4.4 3.5 - 5.2 g/dL    ALT (SGPT) 39 (H) 1 - 33 U/L    AST (SGOT) 42 (H) 1 - 32  U/L    Alkaline Phosphatase 126 (H) 39 - 117 U/L    Total Bilirubin 0.9 0.0 - 1.2 mg/dL    Globulin 2.9 gm/dL    A/G Ratio 1.5 g/dL    BUN/Creatinine Ratio 23.2 7.0 - 25.0    Anion Gap 6.0 5.0 - 15.0 mmol/L    eGFR 94.7 >60.0 mL/min/1.73   Lipase    Collection Time: 11/24/23  8:20 AM    Specimen: Blood   Result Value Ref Range    Lipase 49 13 - 60 U/L   Lactic Acid, Plasma    Collection Time: 11/24/23  8:20 AM    Specimen: Blood   Result Value Ref Range    Lactate 1.1 0.5 - 2.0 mmol/L   Green Top (Gel)    Collection Time: 11/24/23  8:20 AM   Result Value Ref Range    Extra Tube Hold for add-ons.    Lavender Top    Collection Time: 11/24/23  8:20 AM   Result Value Ref Range    Extra Tube hold for add-on    Gold Top - SST    Collection Time: 11/24/23  8:20 AM   Result Value Ref Range    Extra Tube Hold for add-ons.    Gray Top    Collection Time: 11/24/23  8:20 AM   Result Value Ref Range    Extra Tube Hold for add-ons.    Light Blue Top    Collection Time: 11/24/23  8:20 AM   Result Value Ref Range    Extra Tube Hold for add-ons.    CBC Auto Differential    Collection Time: 11/24/23  8:20 AM    Specimen: Blood   Result Value Ref Range    WBC 6.31 3.40 - 10.80 10*3/mm3    RBC 4.30 3.77 - 5.28 10*6/mm3    Hemoglobin 13.5 12.0 - 15.9 g/dL    Hematocrit 41.7 34.0 - 46.6 %    MCV 97.0 79.0 - 97.0 fL    MCH 31.4 26.6 - 33.0 pg    MCHC 32.4 31.5 - 35.7 g/dL    RDW 12.6 12.3 - 15.4 %    RDW-SD 45.1 37.0 - 54.0 fl    MPV 9.2 6.0 - 12.0 fL    Platelets 240 140 - 450 10*3/mm3    Neutrophil % 67.9 42.7 - 76.0 %    Lymphocyte % 20.9 19.6 - 45.3 %    Monocyte % 7.8 5.0 - 12.0 %    Eosinophil % 2.9 0.3 - 6.2 %    Basophil % 0.2 0.0 - 1.5 %    Immature Grans % 0.3 0.0 - 0.5 %    Neutrophils, Absolute 4.29 1.70 - 7.00 10*3/mm3    Lymphocytes, Absolute 1.32 0.70 - 3.10 10*3/mm3    Monocytes, Absolute 0.49 0.10 - 0.90 10*3/mm3    Eosinophils, Absolute 0.18 0.00 - 0.40 10*3/mm3    Basophils, Absolute 0.01 0.00 - 0.20 10*3/mm3     "Immature Grans, Absolute 0.02 0.00 - 0.05 10*3/mm3    nRBC 0.0 0.0 - 0.2 /100 WBC     Note: In addition to lab results from this visit, the labs listed above may include labs taken at another facility or during a different encounter within the last 24 hours. Please correlate lab times with ED admission and discharge times for further clarification of the services performed during this visit.    CT Abdomen Pelvis With Contrast   Final Result   Impression:   No acute abdominopelvic findings. No findings to explain reported flank pain. No hydronephrosis or nephrolithiasis.      Mild hepatic steatosis.      Small sliding hiatal hernia with wall thickening of the distal esophagus which can be seen with GERD. Sigmoid colonic diverticulosis without diverticulitis.            Electronically Signed: Pancho Price MD     11/24/2023 9:28 AM EST     Workstation ID: [id number]        Vitals:    11/24/23 0757 11/24/23 0814 11/24/23 0845   BP: 129/81 142/83    BP Location: Left arm     Patient Position: Sitting     Pulse: 87 84 84   Resp: 16     Temp: 98.2 °F (36.8 °C)     TempSrc: Oral     SpO2: 98% 98% 97%   Weight: 77.1 kg (170 lb)     Height: 165.1 cm (65\")       Medications   iopamidol (ISOVUE-300) 61 % injection 100 mL (85 mL Intravenous Given 11/24/23 0906)   ketorolac (TORADOL) injection 15 mg (15 mg Intravenous Given 11/24/23 0950)     ECG/EMG Results (last 24 hours)       ** No results found for the last 24 hours. **          No orders to display       DISCHARGE    Patient discharged in stable condition.    Reviewed implications of results, diagnosis, meds, responsibility to follow up, warning signs and symptoms of possible worsening, potential complications and reasons to return to ER.    Patient/Family voiced understanding of above instructions.    Discussed plan for discharge, as there is no emergent indication for admission.  Pt/family is agreeable and understands need for follow up and possible repeat testing.  " Pt/family is aware that discharge does not mean that nothing is wrong but that it indicates no emergency is currently present that requires admission and they must continue care with follow-up as given below or with a physician of their choice.     FOLLOW-UP  Mary Antonio DO  [address] [facility] [id number] [phone]    Schedule an appointment as soon as possible for a visit       [facility] EMERGENCY DEPARTMENT  [address]  [facility] [address] [phone]    If symptoms worsen         Medication List        New Prescriptions      cefdinir 300 MG capsule  Commonly known as: OMNICEF  Take 1 capsule by mouth 2 (Two) Times a Day for 10 days.     ketorolac 10 MG tablet  Commonly known as: TORADOL  Take 1 tablet by mouth Every 6 (Six) Hours As Needed for Moderate Pain for up to 12 doses. Patient given in ED.     ondansetron ODT 4 MG disintegrating tablet  Commonly known as: ZOFRAN-ODT  Place 1 tablet on the tongue Every 6 (Six) Hours As Needed for Nausea or Vomiting for up to 15 doses.               Where to Get Your Medications        These medications were sent to [id number] - [place], [place] - [address] - [phone]  - [phone]   [address], located within [facility] [address]      Phone: [phone]   cefdinir 300 MG capsule  ketorolac 10 MG tablet  ondansetron ODT 4 MG disintegrating tablet                                         Medical Decision Making  Pt is a 69 yo female presenting to ED with complaints of bilateral flank pain for 3 days. Labs in ED notable for WBC 6.3, Lactic 1.1, Cr 0.69, Glucose 92 and UA with 21-50 WBC and moderate leukocytes. CT abd/pelvis with esophageal thickening and hiatal hernia but no kidney stones or other emergent acute findings. Noted mildly elevated LFTs. Discussed results and tx plan. Cultured urine. Will dc home on course of Omnicef and Zofran. She will f/u with PCP  and return to ED if new / worse sx.     DDx  UTI, Kidney stones, Pyelonephritis, ALAN, Dehydration, SBO    Problems Addressed:  Acute cystitis without hematuria: complicated acute illness or injury  Bilateral flank pain: complicated acute illness or injury  Elevated liver function tests: complicated acute illness or injury  Thickening of esophagus: complicated acute illness or injury    Amount and/or Complexity of Data Reviewed  Independent Historian: spouse  External Data Reviewed: notes.     Details: PCP  Labs: ordered. Decision-making details documented in ED Course.  Radiology: ordered. Decision-making details documented in ED Course.    Risk  Prescription drug management.        Final diagnoses:   Acute cystitis without hematuria   Bilateral flank pain   Elevated liver function tests   Thickening of esophagus       ED Disposition  ED Disposition       ED Disposition   Discharge    Condition   Stable    Comment   --               Mary Antonio DO  [address] [facility] [address] [phone]    Schedule an appointment as soon as possible for a visit       [facility] EMERGENCY DEPARTMENT  [address] [facility] [address] [phone]    If symptoms worsen         Medication List        New Prescriptions      cefdinir 300 MG capsule  Commonly known as: OMNICEF  Take 1 capsule by mouth 2 (Two) Times a Day for 10 days.     ketorolac 10 MG tablet  Commonly known as: TORADOL  Take 1 tablet by mouth Every 6 (Six) Hours As Needed for Moderate Pain for up to 12 doses. Patient given in ED.     ondansetron ODT 4 MG disintegrating tablet  Commonly known as: ZOFRAN-ODT  Place 1 tablet on the tongue Every 6 (Six) Hours As Needed for Nausea or Vomiting for up to 15 doses.               Where to Get Your Medications        These medications were sent to [facility] #[id number] - [place], [place] - [address] [facility] [phone] [facility] [phone] FX   [address], [facility] [address]      Phone: [phone]   cefdinir 300 MG capsule  ketorolac 10 MG tablet  ondansetron ODT 4 MG disintegrating tablet            Debby Howard PA  11/24/23 3393

## 2023-11-25 LAB — BACTERIA SPEC AEROBE CULT: NORMAL

## 2023-12-05 ENCOUNTER — LAB (OUTPATIENT)
Dept: LAB | Facility: HOSPITAL | Age: 68
End: 2023-12-05
Payer: MEDICARE

## 2023-12-05 ENCOUNTER — HOSPITAL ENCOUNTER (OUTPATIENT)
Dept: GENERAL RADIOLOGY | Facility: HOSPITAL | Age: 68
Discharge: HOME | End: 2023-12-05
Admitting: INTERNAL MEDICINE
Payer: MEDICARE

## 2023-12-05 ENCOUNTER — OFFICE VISIT (OUTPATIENT)
Dept: INTERNAL MEDICINE | Facility: CLINIC | Age: 68
End: 2023-12-05
Payer: MEDICARE

## 2023-12-05 VITALS
WEIGHT: 176 LBS | DIASTOLIC BLOOD PRESSURE: 92 MMHG | TEMPERATURE: 98.1 F | OXYGEN SATURATION: 98 % | SYSTOLIC BLOOD PRESSURE: 134 MMHG | HEART RATE: 74 BPM | HEIGHT: 65 IN | BODY MASS INDEX: 29.32 KG/M2

## 2023-12-05 DIAGNOSIS — R74.8 ABNORMAL LIVER ENZYMES: ICD-10-CM

## 2023-12-05 DIAGNOSIS — M54.50 LOW BACK PAIN AT MULTIPLE SITES: Primary | ICD-10-CM

## 2023-12-05 DIAGNOSIS — M54.50 LOW BACK PAIN AT MULTIPLE SITES: ICD-10-CM

## 2023-12-05 PROCEDURE — 36415 COLL VENOUS BLD VENIPUNCTURE: CPT

## 2023-12-05 PROCEDURE — 85652 RBC SED RATE AUTOMATED: CPT

## 2023-12-05 PROCEDURE — 80053 COMPREHEN METABOLIC PANEL: CPT

## 2023-12-05 PROCEDURE — 86140 C-REACTIVE PROTEIN: CPT

## 2023-12-05 PROCEDURE — 72100 X-RAY EXAM L-S SPINE 2/3 VWS: CPT

## 2023-12-05 RX ORDER — TRAMADOL HYDROCHLORIDE 50 MG/1
50 TABLET ORAL EVERY 6 HOURS PRN
Qty: 20 TABLET | Refills: 0 | Status: SHIPPED | OUTPATIENT
Start: 2023-12-05

## 2023-12-05 NOTE — PROGRESS NOTES
"Subjective   Kelly Strickland is a 68 y.o. female.     History of Present Illness   Flank pain started on right side and travels across. Went to ED. CT abdomen done.  Was given Omnicef. Pain has not improved. The pain is less once she is up moving around. No heavy lifting. No trauma. No rash. No fever. Nausea with it. No diarrhea. No abdominal pain. No urinary frequency or burning.  Was given tramadol in ED and helped.  LFT's were increased in eD.  CT Abdomen Pelvis With Contrast    Result Date: 11/24/2023  Impression: No acute abdominopelvic findings. No findings to explain reported flank pain. No hydronephrosis or nephrolithiasis. Mild hepatic steatosis. Small sliding hiatal hernia with wall thickening of the distal esophagus which can be seen with GERD. Sigmoid colonic diverticulosis without diverticulitis. Electronically Signed: Pancho Price MD  11/24/2023 9:28 AM EST  Workstation ID: [id number]    The following portions of the patient's history were reviewed and updated as appropriate: allergies, current medications, past family history, past medical history, past social history, past surgical history, and problem list.    Review of Systems   Constitutional:  Negative for fever.   Gastrointestinal:  Negative for vomiting.   Genitourinary:  Positive for flank pain.   Musculoskeletal:  Positive for back pain.     /92   Pulse 74   Temp 98.1 °F (36.7 °C)   Ht 165.1 cm (65\")   Wt 79.8 kg (176 lb)   SpO2 98%   BMI 29.29 kg/m²     Objective   Physical Exam  Vitals reviewed.   Cardiovascular:      Rate and Rhythm: Normal rate and regular rhythm.   Abdominal:      Tenderness: There is no right CVA tenderness or left CVA tenderness.   Musculoskeletal:         General: No deformity.   Skin:     Findings: No rash.   Neurological:      Mental Status: She is alert.         Assessment & Plan   Diagnoses and all orders for this visit:    1. Low back pain at multiple sites (Primary)  -     XR Spine Lumbar 2 or 3 " View; Future  -     C-reactive Protein; Future  -     Sedimentation Rate; Future  -     traMADol (ULTRAM) 50 MG tablet; Take 1 tablet by mouth Every 6 (Six) Hours As Needed for Moderate Pain.  Dispense: 20 tablet; Refill: 0    2. Abnormal liver enzymes  -     Comprehensive metabolic panel; Future

## 2023-12-06 LAB
ALBUMIN SERPL-MCNC: 4.3 G/DL (ref 3.5–5.2)
ALBUMIN/GLOB SERPL: 1.4 G/DL
ALP SERPL-CCNC: 135 U/L (ref 39–117)
ALT SERPL W P-5'-P-CCNC: 26 U/L (ref 1–33)
ANION GAP SERPL CALCULATED.3IONS-SCNC: 12.5 MMOL/L (ref 5–15)
AST SERPL-CCNC: 23 U/L (ref 1–32)
BILIRUB SERPL-MCNC: 0.7 MG/DL (ref 0–1.2)
BUN SERPL-MCNC: 13 MG/DL (ref 8–23)
BUN/CREAT SERPL: 17.6 (ref 7–25)
CALCIUM SPEC-SCNC: 9.3 MG/DL (ref 8.6–10.5)
CHLORIDE SERPL-SCNC: 105 MMOL/L (ref 98–107)
CO2 SERPL-SCNC: 22.5 MMOL/L (ref 22–29)
CREAT SERPL-MCNC: 0.74 MG/DL (ref 0.57–1)
CRP SERPL-MCNC: 0.94 MG/DL (ref 0–0.5)
EGFRCR SERPLBLD CKD-EPI 2021: 88.3 ML/MIN/1.73
ERYTHROCYTE [SEDIMENTATION RATE] IN BLOOD: 11 MM/HR (ref 0–30)
GLOBULIN UR ELPH-MCNC: 3 GM/DL
GLUCOSE SERPL-MCNC: 80 MG/DL (ref 65–99)
POTASSIUM SERPL-SCNC: 4 MMOL/L (ref 3.5–5.2)
PROT SERPL-MCNC: 7.3 G/DL (ref 6–8.5)
SODIUM SERPL-SCNC: 140 MMOL/L (ref 136–145)

## 2023-12-07 ENCOUNTER — TELEPHONE (OUTPATIENT)
Dept: INTERNAL MEDICINE | Facility: CLINIC | Age: 68
End: 2023-12-07
Payer: MEDICARE

## 2023-12-07 DIAGNOSIS — S22.080A T12 COMPRESSION FRACTURE, INITIAL ENCOUNTER: Primary | ICD-10-CM

## 2023-12-07 DIAGNOSIS — S22.080A COMPRESSION FRACTURE OF T12 VERTEBRA, INITIAL ENCOUNTER: Primary | ICD-10-CM

## 2023-12-07 NOTE — TELEPHONE ENCOUNTER
Called patient to inform her that the STAT MRI of the Lumbar Spine has been scheduled. Patient verbalized understanding to this.     Patient also mentioned that she had not discussed an MRI with Dr Antonio yet and was hoping to hear from Dr Antonio as soon as possible regarding her recent x-ray results of the Lumbar Spine.

## 2023-12-07 NOTE — TELEPHONE ENCOUNTER
----- Message from Mary Antonio DO sent at 12/7/2023 10:41 AM EST -----  T12 compression fracture. I have ordered a mri and referred to neurosurgery

## 2023-12-11 ENCOUNTER — HOSPITAL ENCOUNTER (OUTPATIENT)
Dept: MRI IMAGING | Facility: HOSPITAL | Age: 68
Discharge: HOME | End: 2023-12-11
Admitting: INTERNAL MEDICINE
Payer: MEDICARE

## 2023-12-11 DIAGNOSIS — S22.080A COMPRESSION FRACTURE OF T12 VERTEBRA, INITIAL ENCOUNTER: ICD-10-CM

## 2023-12-11 PROCEDURE — 72148 MRI LUMBAR SPINE W/O DYE: CPT

## 2023-12-12 ENCOUNTER — OFFICE VISIT (OUTPATIENT)
Dept: INTERNAL MEDICINE | Facility: CLINIC | Age: 68
End: 2023-12-12
Payer: MEDICARE

## 2023-12-12 ENCOUNTER — TELEPHONE (OUTPATIENT)
Dept: INTERNAL MEDICINE | Facility: CLINIC | Age: 68
End: 2023-12-12

## 2023-12-12 VITALS
BODY MASS INDEX: 29.41 KG/M2 | DIASTOLIC BLOOD PRESSURE: 82 MMHG | WEIGHT: 176.5 LBS | HEIGHT: 65 IN | SYSTOLIC BLOOD PRESSURE: 137 MMHG | HEART RATE: 94 BPM | OXYGEN SATURATION: 98 %

## 2023-12-12 DIAGNOSIS — S22.080A COMPRESSION FRACTURE OF T12 VERTEBRA, INITIAL ENCOUNTER: Primary | ICD-10-CM

## 2023-12-12 NOTE — PROGRESS NOTES
"Subjective   Kelly Strickland is a 68 y.o. female.     History of Present Illness   F/u on back pain. MRI confirmed acute t12 compression fracture. Neurosurgery referral has already been placed.   The following portions of the patient's history were reviewed and updated as appropriate: allergies, current medications, past family history, past medical history, past social history, past surgical history, and problem list.    Review of Systems   Constitutional:  Negative for fever.   Respiratory:  Negative for cough and shortness of breath.    Cardiovascular:  Negative for chest pain and leg swelling.   Musculoskeletal:  Positive for back pain.     /82   Pulse 94   Ht 165.1 cm (65\")   Wt 80.1 kg (176 lb 8 oz)   SpO2 98%   BMI 29.37 kg/m²     Objective   Physical Exam  Vitals reviewed.   Musculoskeletal:         General: Tenderness (thoracic area) present.   Neurological:      Mental Status: She is alert and oriented to person, place, and time.       Assessment & Plan   Diagnoses and all orders for this visit:    1. Compression fracture of T12 vertebra, initial encounter (Primary)  Awaiting neurosurgery eval. Continue Tramadol prn pain               "

## 2023-12-12 NOTE — TELEPHONE ENCOUNTER
----- Message from Mary Antonio DO sent at 12/12/2023  7:34 AM EST -----  T12 fracture is acute. Sending to neurosurgery

## 2023-12-13 ENCOUNTER — TELEPHONE (OUTPATIENT)
Dept: INTERNAL MEDICINE | Facility: CLINIC | Age: 68
End: 2023-12-13
Payer: MEDICARE

## 2023-12-13 NOTE — TELEPHONE ENCOUNTER
She would have to get the disc of the MRI from the facility that she had the MRI done at. We do not have the disc of her MRI in our office.

## 2023-12-13 NOTE — TELEPHONE ENCOUNTER
I received a call from  Neurosurgery today. Dr Ernandez is no longer seeing spine patients. I was advised the patient was not happy about this.     I attempted to reach out to the patient to ask where she would like to go. I went ahead and call [facility] and made for an appointment for next Friday 12/22/2023 at 8:30 am.    Patient needs a disc copy of her MRI of the Lumber Spine Results that she can take to the appointment with her. Please let patient know when the disc copy is ready for pick-up.

## 2024-03-14 ENCOUNTER — TELEPHONE (OUTPATIENT)
Dept: INTERNAL MEDICINE | Facility: CLINIC | Age: 69
End: 2024-03-14
Payer: MEDICARE

## 2024-03-14 NOTE — TELEPHONE ENCOUNTER
LUANA WITH [facility] IS CALLING FOR THE PATIENT BECAUSE THE PATIENT HAS BEEN DOING PT AT [facility] FOR COMPRESSION FRACTURE. PT STATES THAT THE PATIENT WAS HAVING EPISODES OF HIGH BP/ DIZZINESS FOR A COUPLE OF WEEKS. THINGS IMPROVED FOR A COUPLE OF WEEK BUT YESTERDAY THE PATIENT AGAIN WAS VERY DIZZY AND FELT LIKE SHE WAS GOING TO FALL OVER. HER BP /100. I DID CALL PATIENT AND GOT HER SCHEDULED TO COME IN TOMORROW MORNING TO SEE DR. CHAN AT 8:30AM. LUANA'S CALLBACK NUMBER IF NEEDED .[phone]. PATIENT WAS FINE WAITING UNTIL TOMORROW TO BE SEEN AND IS ONLY MAKING AN APPOINTMENT BECAUSE PT STATES THAT SHE REALLY THINKS THE PATIENT SHOULD FOLLOW UP WITH PCP.

## 2024-03-15 ENCOUNTER — OFFICE VISIT (OUTPATIENT)
Dept: INTERNAL MEDICINE | Facility: CLINIC | Age: 69
End: 2024-03-15
Payer: MEDICARE

## 2024-03-15 ENCOUNTER — LAB (OUTPATIENT)
Dept: LAB | Facility: HOSPITAL | Age: 69
End: 2024-03-15
Payer: MEDICARE

## 2024-03-15 VITALS
SYSTOLIC BLOOD PRESSURE: 120 MMHG | HEART RATE: 78 BPM | WEIGHT: 166.6 LBS | HEIGHT: 65 IN | DIASTOLIC BLOOD PRESSURE: 90 MMHG | OXYGEN SATURATION: 99 % | BODY MASS INDEX: 27.76 KG/M2

## 2024-03-15 DIAGNOSIS — I10 BENIGN ESSENTIAL HYPERTENSION: ICD-10-CM

## 2024-03-15 DIAGNOSIS — I10 BENIGN ESSENTIAL HYPERTENSION: Primary | ICD-10-CM

## 2024-03-15 LAB
ALBUMIN SERPL-MCNC: 4.2 G/DL (ref 3.5–5.2)
ALBUMIN/GLOB SERPL: 1.8 G/DL
ALP SERPL-CCNC: 127 U/L (ref 39–117)
ALT SERPL W P-5'-P-CCNC: 25 U/L (ref 1–33)
ANION GAP SERPL CALCULATED.3IONS-SCNC: 15.1 MMOL/L (ref 5–15)
AST SERPL-CCNC: 24 U/L (ref 1–32)
BASOPHILS # BLD AUTO: 0.01 10*3/MM3 (ref 0–0.2)
BASOPHILS NFR BLD AUTO: 0.2 % (ref 0–1.5)
BILIRUB SERPL-MCNC: 0.9 MG/DL (ref 0–1.2)
BUN SERPL-MCNC: 13 MG/DL (ref 8–23)
BUN/CREAT SERPL: 14.6 (ref 7–25)
CALCIUM SPEC-SCNC: 8.9 MG/DL (ref 8.6–10.5)
CHLORIDE SERPL-SCNC: 105 MMOL/L (ref 98–107)
CO2 SERPL-SCNC: 21.9 MMOL/L (ref 22–29)
CREAT SERPL-MCNC: 0.89 MG/DL (ref 0.57–1)
DEPRECATED RDW RBC AUTO: 41.5 FL (ref 37–54)
EGFRCR SERPLBLD CKD-EPI 2021: 70.3 ML/MIN/1.73
EOSINOPHIL # BLD AUTO: 0.16 10*3/MM3 (ref 0–0.4)
EOSINOPHIL NFR BLD AUTO: 3.3 % (ref 0.3–6.2)
ERYTHROCYTE [DISTWIDTH] IN BLOOD BY AUTOMATED COUNT: 12.7 % (ref 12.3–15.4)
GLOBULIN UR ELPH-MCNC: 2.4 GM/DL
GLUCOSE SERPL-MCNC: 77 MG/DL (ref 65–99)
HCT VFR BLD AUTO: 40 % (ref 34–46.6)
HGB BLD-MCNC: 13.6 G/DL (ref 12–15.9)
IMM GRANULOCYTES # BLD AUTO: 0.01 10*3/MM3 (ref 0–0.05)
IMM GRANULOCYTES NFR BLD AUTO: 0.2 % (ref 0–0.5)
LYMPHOCYTES # BLD AUTO: 1.59 10*3/MM3 (ref 0.7–3.1)
LYMPHOCYTES NFR BLD AUTO: 32.4 % (ref 19.6–45.3)
MCH RBC QN AUTO: 30.6 PG (ref 26.6–33)
MCHC RBC AUTO-ENTMCNC: 34 G/DL (ref 31.5–35.7)
MCV RBC AUTO: 89.9 FL (ref 79–97)
MONOCYTES # BLD AUTO: 0.4 10*3/MM3 (ref 0.1–0.9)
MONOCYTES NFR BLD AUTO: 8.2 % (ref 5–12)
NEUTROPHILS NFR BLD AUTO: 2.73 10*3/MM3 (ref 1.7–7)
NEUTROPHILS NFR BLD AUTO: 55.7 % (ref 42.7–76)
NRBC BLD AUTO-RTO: 0 /100 WBC (ref 0–0.2)
PLATELET # BLD AUTO: 258 10*3/MM3 (ref 140–450)
PMV BLD AUTO: 10 FL (ref 6–12)
POTASSIUM SERPL-SCNC: 3.9 MMOL/L (ref 3.5–5.2)
PROT SERPL-MCNC: 6.6 G/DL (ref 6–8.5)
RBC # BLD AUTO: 4.45 10*6/MM3 (ref 3.77–5.28)
SODIUM SERPL-SCNC: 142 MMOL/L (ref 136–145)
WBC NRBC COR # BLD AUTO: 4.9 10*3/MM3 (ref 3.4–10.8)

## 2024-03-15 PROCEDURE — 1159F MED LIST DOCD IN RCRD: CPT | Performed by: INTERNAL MEDICINE

## 2024-03-15 PROCEDURE — 3074F SYST BP LT 130 MM HG: CPT | Performed by: INTERNAL MEDICINE

## 2024-03-15 PROCEDURE — 1160F RVW MEDS BY RX/DR IN RCRD: CPT | Performed by: INTERNAL MEDICINE

## 2024-03-15 PROCEDURE — 99213 OFFICE O/P EST LOW 20 MIN: CPT | Performed by: INTERNAL MEDICINE

## 2024-03-15 PROCEDURE — 80053 COMPREHEN METABOLIC PANEL: CPT

## 2024-03-15 PROCEDURE — 3080F DIAST BP >= 90 MM HG: CPT | Performed by: INTERNAL MEDICINE

## 2024-03-15 PROCEDURE — 85025 COMPLETE CBC W/AUTO DIFF WBC: CPT

## 2024-03-15 RX ORDER — HYDROCODONE BITARTRATE AND ACETAMINOPHEN 7.5; 325 MG/1; MG/1
TABLET ORAL
COMMUNITY
Start: 2024-01-15

## 2024-03-15 RX ORDER — AMLODIPINE BESYLATE 2.5 MG/1
2.5 TABLET ORAL DAILY
Qty: 30 TABLET | Refills: 1 | Status: SHIPPED | OUTPATIENT
Start: 2024-03-15 | End: 2024-03-15

## 2024-03-15 RX ORDER — ACETAMINOPHEN 160 MG
TABLET,DISINTEGRATING ORAL
COMMUNITY

## 2024-03-15 RX ORDER — ONDANSETRON 4 MG/1
4 TABLET, ORALLY DISINTEGRATING ORAL
COMMUNITY
Start: 2023-11-24

## 2024-03-15 RX ORDER — AMLODIPINE BESYLATE 2.5 MG/1
2.5 TABLET ORAL DAILY
Qty: 90 TABLET | Refills: 0 | Status: SHIPPED | OUTPATIENT
Start: 2024-03-15

## 2024-03-15 RX ORDER — CYCLOBENZAPRINE HCL 10 MG
10 TABLET ORAL
COMMUNITY
Start: 2024-01-12

## 2024-03-15 NOTE — PROGRESS NOTES
"Subjective   Kelly Strickland is a 69 y.o. female.     History of Present Illness   During PT she became dizzy and BP checked there and was 142/100. Had this reading on 2 different days. No HA.  No dizziness today. Happens when side stepping in PT with a band.   The following portions of the patient's history were reviewed and updated as appropriate: allergies, current medications, past family history, past medical history, past social history, past surgical history, and problem list.    Review of Systems   Constitutional:  Negative for fatigue and fever.   Respiratory:  Negative for cough and shortness of breath.    Cardiovascular:  Negative for chest pain and palpitations.   Gastrointestinal:  Negative for abdominal pain.   Psychiatric/Behavioral:  Negative for confusion.      /90   Pulse 78   Ht 165.1 cm (65\")   Wt 75.6 kg (166 lb 9.6 oz)   SpO2 99%   BMI 27.72 kg/m²     Objective   Physical Exam  Vitals reviewed.   Cardiovascular:      Rate and Rhythm: Normal rate and regular rhythm.   Pulmonary:      Effort: No respiratory distress.      Breath sounds: No wheezing.   Neurological:      Mental Status: She is alert and oriented to person, place, and time.         Assessment & Plan   Diagnoses and all orders for this visit:    1. Benign essential hypertension (Primary)  -     amLODIPine (Norvasc) 2.5 MG tablet; Take 1 tablet by mouth Daily.  Dispense: 30 tablet; Refill: 1  -     CBC & Differential; Future  -     Comprehensive Metabolic Panel; Future  Start Norvasc 2.5 mg po qd.  3 week f/u               "

## 2024-04-24 NOTE — PROGRESS NOTES
Immunization  Immunization performed in Left Deltoid by Hilda Singh MA. Patient tolerated the procedure well without complications.  05/01/23   Hilda Singh MA   Saleem was seen at home with his foster parents, Darin and Tricia Phipps, along with DSS , Evelyn, and [facility] team (Nps, RN, LCSW, ) for routine follow-up palliative care home visit. This NP was meeting patient and family for first time, introducing self and role on the team and reviewing on-call and PRN supports from this NP. Tricia reports some improvements in Saleem's abilities and skill acquisition in the past month since last visit and reports her only current concern is ongoing discomfort with feeds despite trying multiple different formulas with guidance from medication team. Mom notes Saleem has improved/resolved feeding discomfort with use of donor breastmilk and was encouraged to discuss her concerns and findings with peds GI at upcoming appointment in a few weeks. See GURJIT Fitzpatrick note for further details of the visit.    MARGARITO Nath - NP  Pediatric Nurse Practitioner  [facility]  P:[phone]

## 2024-04-26 ENCOUNTER — OFFICE VISIT (OUTPATIENT)
Dept: INTERNAL MEDICINE | Facility: CLINIC | Age: 69
End: 2024-04-26
Payer: MEDICARE

## 2024-04-26 VITALS
HEIGHT: 65 IN | OXYGEN SATURATION: 100 % | SYSTOLIC BLOOD PRESSURE: 128 MMHG | WEIGHT: 169 LBS | DIASTOLIC BLOOD PRESSURE: 82 MMHG | BODY MASS INDEX: 28.16 KG/M2 | HEART RATE: 78 BPM

## 2024-04-26 DIAGNOSIS — I10 BENIGN ESSENTIAL HYPERTENSION: Primary | ICD-10-CM

## 2024-04-26 PROCEDURE — G2211 COMPLEX E/M VISIT ADD ON: HCPCS | Performed by: INTERNAL MEDICINE

## 2024-04-26 PROCEDURE — 3074F SYST BP LT 130 MM HG: CPT | Performed by: INTERNAL MEDICINE

## 2024-04-26 PROCEDURE — 1160F RVW MEDS BY RX/DR IN RCRD: CPT | Performed by: INTERNAL MEDICINE

## 2024-04-26 PROCEDURE — 3079F DIAST BP 80-89 MM HG: CPT | Performed by: INTERNAL MEDICINE

## 2024-04-26 PROCEDURE — 99213 OFFICE O/P EST LOW 20 MIN: CPT | Performed by: INTERNAL MEDICINE

## 2024-04-26 PROCEDURE — 1159F MED LIST DOCD IN RCRD: CPT | Performed by: INTERNAL MEDICINE

## 2024-04-26 NOTE — PROGRESS NOTES
"Subjective   Kelly Strickland is a 69 y.o. female.   Chief Complaint   Patient presents with    Hypertension       History of Present Illness   F/u on HTN. HTN controlled with Norvasc. No CP or SOA. No HA.  The following portions of the patient's history were reviewed and updated as appropriate: allergies, current medications, past family history, past medical history, past social history, past surgical history, and problem list.    Review of Systems   Constitutional:  Negative for fever.   Respiratory:  Negative for cough and shortness of breath.    Cardiovascular:  Negative for chest pain and leg swelling.   Gastrointestinal:  Negative for abdominal pain.   Neurological:  Negative for weakness.     /82   Pulse 78   Ht 165.1 cm (65\")   Wt 76.7 kg (169 lb)   SpO2 100%   BMI 28.12 kg/m²     Objective   Physical Exam  Vitals reviewed.   Cardiovascular:      Rate and Rhythm: Normal rate and regular rhythm.   Pulmonary:      Effort: No respiratory distress.      Breath sounds: No wheezing.   Neurological:      Mental Status: She is alert and oriented to person, place, and time.   Psychiatric:         Mood and Affect: Mood normal.         Behavior: Behavior normal.         Assessment & Plan   Diagnoses and all orders for this visit:    1. Benign essential hypertension (Primary)  Norvasc is working well. Continue Norvasc 2.5 mg po qd.               "

## 2024-05-02 ENCOUNTER — TELEPHONE (OUTPATIENT)
Dept: INTERNAL MEDICINE | Facility: CLINIC | Age: 69
End: 2024-05-02
Payer: MEDICARE

## 2024-05-02 DIAGNOSIS — K22.89 ESOPHAGEAL THICKENING: Primary | ICD-10-CM

## 2024-05-02 NOTE — TELEPHONE ENCOUNTER
----- Message from Ella MCCORMICK sent at 5/2/2024  1:26 PM EDT -----  I put a referral in with Dr. Mehta for an EGD  ----- Message -----  From: Myrna Voss MA  Sent: 5/2/2024  12:05 PM EDT  To: Mary Antonio DO    Patient did not have an EGD and states no one told her she needed to have one, states she is not having any sxs of gerd either  ----- Message -----  From: Mary Antonio DO  Sent: 5/2/2024   9:33 AM EDT  To: Myrna Voss MA    Call her. She had CT at end of Nov ordered by another doctor.  She had some distal wall thickening and I wanted her to have EGD. She had compression fracture in Dec so I dont know if she got in to see Dr. Cronin for the EGD. Send message back so I know.

## 2024-05-06 ENCOUNTER — LAB (OUTPATIENT)
Dept: LAB | Facility: HOSPITAL | Age: 69
End: 2024-05-06
Payer: MEDICARE

## 2024-05-06 ENCOUNTER — OFFICE VISIT (OUTPATIENT)
Dept: INTERNAL MEDICINE | Facility: CLINIC | Age: 69
End: 2024-05-06
Payer: MEDICARE

## 2024-05-06 VITALS
HEIGHT: 65 IN | HEART RATE: 73 BPM | OXYGEN SATURATION: 100 % | SYSTOLIC BLOOD PRESSURE: 138 MMHG | DIASTOLIC BLOOD PRESSURE: 74 MMHG | TEMPERATURE: 97.4 F | BODY MASS INDEX: 28.52 KG/M2 | WEIGHT: 171.2 LBS

## 2024-05-06 DIAGNOSIS — F41.9 ANXIETY: ICD-10-CM

## 2024-05-06 DIAGNOSIS — Z13.29 THYROID DISORDER SCREEN: ICD-10-CM

## 2024-05-06 DIAGNOSIS — Z13.220 SCREENING CHOLESTEROL LEVEL: ICD-10-CM

## 2024-05-06 DIAGNOSIS — K22.89 ESOPHAGEAL THICKENING: ICD-10-CM

## 2024-05-06 DIAGNOSIS — I10 BENIGN ESSENTIAL HYPERTENSION: ICD-10-CM

## 2024-05-06 DIAGNOSIS — Z00.00 INITIAL MEDICARE ANNUAL WELLNESS VISIT: Primary | ICD-10-CM

## 2024-05-06 PROCEDURE — 3078F DIAST BP <80 MM HG: CPT | Performed by: INTERNAL MEDICINE

## 2024-05-06 PROCEDURE — 1159F MED LIST DOCD IN RCRD: CPT | Performed by: INTERNAL MEDICINE

## 2024-05-06 PROCEDURE — 3075F SYST BP GE 130 - 139MM HG: CPT | Performed by: INTERNAL MEDICINE

## 2024-05-06 PROCEDURE — 1160F RVW MEDS BY RX/DR IN RCRD: CPT | Performed by: INTERNAL MEDICINE

## 2024-05-06 PROCEDURE — 1170F FXNL STATUS ASSESSED: CPT | Performed by: INTERNAL MEDICINE

## 2024-05-06 PROCEDURE — 99213 OFFICE O/P EST LOW 20 MIN: CPT | Performed by: INTERNAL MEDICINE

## 2024-05-06 PROCEDURE — G0439 PPPS, SUBSEQ VISIT: HCPCS | Performed by: INTERNAL MEDICINE

## 2024-05-20 ENCOUNTER — TELEPHONE (OUTPATIENT)
Dept: INTERNAL MEDICINE | Facility: CLINIC | Age: 69
End: 2024-05-20
Payer: MEDICARE

## 2024-05-20 NOTE — TELEPHONE ENCOUNTER
Called pt back to let her know that call had been received and that it had been routed to referral dept to check on status as chart shows Referral as Pending. Left VM

## 2024-05-20 NOTE — TELEPHONE ENCOUNTER
Caller: Kelly Strickland    Relationship to patient: Self    Best call back number: [phone]     PATIENT STATES THAT DR ANTHONY'S OFFICE HAS NOT CALLED TO MAKE AN APPOINTMENT.

## 2024-05-23 ENCOUNTER — PATIENT ROUNDING (BHMG ONLY) (OUTPATIENT)
Dept: URGENT CARE | Facility: CLINIC | Age: 69
End: 2024-05-23
Payer: MEDICARE

## 2024-05-24 DIAGNOSIS — F41.9 ANXIETY: ICD-10-CM

## 2024-05-24 DIAGNOSIS — I10 BENIGN ESSENTIAL HYPERTENSION: ICD-10-CM

## 2024-05-24 RX ORDER — AMLODIPINE BESYLATE 2.5 MG/1
2.5 TABLET ORAL DAILY
Qty: 90 TABLET | Refills: 0 | Status: SHIPPED | OUTPATIENT
Start: 2024-05-24

## 2024-05-24 RX ORDER — SERTRALINE HYDROCHLORIDE 100 MG/1
100 TABLET, FILM COATED ORAL DAILY
Qty: 30 TABLET | Refills: 1 | Status: SHIPPED | OUTPATIENT
Start: 2024-05-24

## 2024-05-24 RX ORDER — BUPROPION HYDROCHLORIDE 150 MG/1
150 TABLET ORAL DAILY
Qty: 90 TABLET | Refills: 3 | Status: SHIPPED | OUTPATIENT
Start: 2024-05-24

## 2024-05-28 ENCOUNTER — LAB (OUTPATIENT)
Dept: LAB | Facility: HOSPITAL | Age: 69
End: 2024-05-28
Payer: MEDICARE

## 2024-05-28 DIAGNOSIS — Z13.220 SCREENING CHOLESTEROL LEVEL: ICD-10-CM

## 2024-05-28 DIAGNOSIS — Z13.29 THYROID DISORDER SCREEN: ICD-10-CM

## 2024-05-28 LAB
CHOLEST SERPL-MCNC: 245 MG/DL (ref 0–200)
HDLC SERPL-MCNC: 86 MG/DL (ref 40–60)
LDLC SERPL CALC-MCNC: 142 MG/DL (ref 0–100)
LDLC/HDLC SERPL: 1.62 {RATIO}
TRIGL SERPL-MCNC: 100 MG/DL (ref 0–150)
TSH SERPL DL<=0.05 MIU/L-ACNC: 2.12 UIU/ML (ref 0.27–4.2)
VLDLC SERPL-MCNC: 17 MG/DL (ref 5–40)

## 2024-05-28 PROCEDURE — 36415 COLL VENOUS BLD VENIPUNCTURE: CPT

## 2024-05-28 PROCEDURE — 80061 LIPID PANEL: CPT

## 2024-05-28 PROCEDURE — 84443 ASSAY THYROID STIM HORMONE: CPT

## 2024-07-17 DIAGNOSIS — F41.9 ANXIETY: ICD-10-CM

## 2024-07-17 RX ORDER — SERTRALINE HYDROCHLORIDE 100 MG/1
100 TABLET, FILM COATED ORAL DAILY
Qty: 55 TABLET | Refills: 1 | Status: SHIPPED | OUTPATIENT
Start: 2024-07-17

## 2024-07-17 NOTE — TELEPHONE ENCOUNTER
Last filled:5/24/24  sertraline (ZOLOFT) 100 MG   30 w/1 refill  LOV:5/6/24  NOV:11/4/24  Filled to next appointment

## 2024-08-16 DIAGNOSIS — I10 BENIGN ESSENTIAL HYPERTENSION: ICD-10-CM

## 2024-08-16 RX ORDER — AMLODIPINE BESYLATE 2.5 MG/1
2.5 TABLET ORAL DAILY
Qty: 90 TABLET | Refills: 0 | OUTPATIENT
Start: 2024-08-16

## 2024-10-04 DIAGNOSIS — I10 BENIGN ESSENTIAL HYPERTENSION: ICD-10-CM

## 2024-10-04 RX ORDER — AMLODIPINE BESYLATE 2.5 MG/1
2.5 TABLET ORAL DAILY
Qty: 90 TABLET | Refills: 0 | Status: SHIPPED | OUTPATIENT
Start: 2024-10-04

## 2024-11-11 ENCOUNTER — OFFICE VISIT (OUTPATIENT)
Dept: INTERNAL MEDICINE | Facility: CLINIC | Age: 69
End: 2024-11-11
Payer: MEDICARE

## 2024-11-11 VITALS
OXYGEN SATURATION: 97 % | DIASTOLIC BLOOD PRESSURE: 72 MMHG | SYSTOLIC BLOOD PRESSURE: 118 MMHG | TEMPERATURE: 97.7 F | WEIGHT: 170 LBS | BODY MASS INDEX: 28.32 KG/M2 | HEIGHT: 65 IN

## 2024-11-11 DIAGNOSIS — F41.9 ANXIETY: ICD-10-CM

## 2024-11-11 DIAGNOSIS — I10 BENIGN ESSENTIAL HYPERTENSION: Primary | ICD-10-CM

## 2024-11-11 PROCEDURE — G2211 COMPLEX E/M VISIT ADD ON: HCPCS | Performed by: INTERNAL MEDICINE

## 2024-11-11 PROCEDURE — 3074F SYST BP LT 130 MM HG: CPT | Performed by: INTERNAL MEDICINE

## 2024-11-11 PROCEDURE — 1160F RVW MEDS BY RX/DR IN RCRD: CPT | Performed by: INTERNAL MEDICINE

## 2024-11-11 PROCEDURE — 1159F MED LIST DOCD IN RCRD: CPT | Performed by: INTERNAL MEDICINE

## 2024-11-11 PROCEDURE — 3078F DIAST BP <80 MM HG: CPT | Performed by: INTERNAL MEDICINE

## 2024-11-11 PROCEDURE — 1126F AMNT PAIN NOTED NONE PRSNT: CPT | Performed by: INTERNAL MEDICINE

## 2024-11-11 PROCEDURE — 99214 OFFICE O/P EST MOD 30 MIN: CPT | Performed by: INTERNAL MEDICINE

## 2024-11-11 NOTE — PROGRESS NOTES
"Subjective   Kelly Strickland is a 69 y.o. female.   Chief Complaint   Patient presents with    Hypertension    Anxiety    Cough     Four days       History of Present Illness   F/u on chronic conditions: HTN and anxiety. HTN controlled with Norvasc. Anxiety controlled with Zoloft and Wellbutrin. No CP or SOA.  Underwent EGD for esoph thickening on scan and was okay.   The following portions of the patient's history were reviewed and updated as appropriate: allergies, current medications, past family history, past medical history, past social history, past surgical history, and problem list.    Review of Systems   Constitutional:  Negative for fatigue and fever.   Respiratory:  Negative for cough and shortness of breath.    Cardiovascular:  Negative for chest pain and leg swelling.   Gastrointestinal:  Negative for abdominal pain.   Neurological:  Negative for weakness.   Psychiatric/Behavioral:  Negative for confusion.      /72 (BP Location: Left arm, Patient Position: Sitting, Cuff Size: Adult)   Temp 97.7 °F (36.5 °C) (Temporal)   Ht 165 cm (64.96\")   Wt 77.1 kg (170 lb)   SpO2 97%   BMI 28.32 kg/m²     Objective   Physical Exam  Vitals reviewed.   Cardiovascular:      Rate and Rhythm: Normal rate and regular rhythm.   Pulmonary:      Effort: No respiratory distress.      Breath sounds: No wheezing.   Neurological:      General: No focal deficit present.      Mental Status: She is alert and oriented to person, place, and time.   Psychiatric:         Mood and Affect: Mood normal.         Behavior: Behavior normal.       Assessment & Plan   Diagnoses and all orders for this visit:    1. Benign essential hypertension (Primary)  Continue Norvasc 2.5 mg po qd Reviewed recent BMP done at   2. Anxiety  Continue Wellbutrin  mg po qd and Zoloft 100 mg po qd.                "

## 2024-11-15 ENCOUNTER — TELEPHONE (OUTPATIENT)
Dept: INTERNAL MEDICINE | Facility: CLINIC | Age: 69
End: 2024-11-15
Payer: MEDICARE

## 2024-11-15 RX ORDER — METRONIDAZOLE 500 MG/1
500 TABLET ORAL 3 TIMES DAILY
Qty: 21 TABLET | Refills: 0 | Status: SHIPPED | OUTPATIENT
Start: 2024-11-15

## 2024-11-15 RX ORDER — CIPROFLOXACIN 500 MG/1
500 TABLET, FILM COATED ORAL 2 TIMES DAILY
Qty: 14 TABLET | Refills: 0 | Status: SHIPPED | OUTPATIENT
Start: 2024-11-15

## 2024-11-15 NOTE — TELEPHONE ENCOUNTER
Spoke with patient regarding script sent and recommendation in holding medication  Patient appreciative and understanding

## 2024-11-15 NOTE — TELEPHONE ENCOUNTER
Caller: Kelly Strickland    Relationship: Self    Best call back number: [phone]      What are your current symptoms: KELLY IS HAVING A DIVERTICULITIS FLARE UP.  WILL YOU CALL IN THE 2 MEDS FROM WHEN SHE HAD THIS IN THE PAST?      How long have you been experiencing symptoms: 3 DAYS    Have you had these symptoms before:    [x] Yes  [] No    Have you been treated for these symptoms before:   [x] Yes  [] No    If a prescription is needed, what is your preferred pharmacy and phone number: [id number] - [place], [place] - [address] AT [facility] [address] [facility] - [phone] [facility] [phone] FX

## 2024-11-15 NOTE — TELEPHONE ENCOUNTER
Spoke with patient regarding request. Patient is wondering if needing to be seen to have medication sent.    Last filled:12/22/23  Ordering/Authorizing: Dougie Otto MD  metroNIDAZOLE (FLAGYL) 500 MG  Dispense Quantity: 21 tablet Refills: 0    Sig: Take 1 tablet by mouth 3 (Three) Times a Day.    Last filled:12/22/23  Ordering/Authorizing: Dougie Otto MD  ciprofloxacin (CIPRO) 500 MG  Dispense Quantity: 14 tablet Refills: 0    Sig: Take 1 tablet by mouth 2 (Two) Times a Day.    LOV:11/11/24  NOV:5/9/25

## 2024-11-19 DIAGNOSIS — F41.9 ANXIETY: ICD-10-CM

## 2024-11-19 RX ORDER — SERTRALINE HYDROCHLORIDE 100 MG/1
100 TABLET, FILM COATED ORAL DAILY
Qty: 55 TABLET | Refills: 1 | Status: SHIPPED | OUTPATIENT
Start: 2024-11-19

## 2024-11-19 NOTE — TELEPHONE ENCOUNTER
Last filled 7/17/24  sertraline (ZOLOFT) 100 MG   55 w/ 1 refills    LOV:  11/11/24  NOV: 5/9/25    Sent in   sertraline (ZOLOFT) 100 MG   55  w/ 1 refills

## 2024-12-02 ENCOUNTER — OFFICE VISIT (OUTPATIENT)
Dept: INTERNAL MEDICINE | Facility: CLINIC | Age: 69
End: 2024-12-02
Payer: MEDICARE

## 2024-12-02 ENCOUNTER — HOSPITAL ENCOUNTER (OUTPATIENT)
Dept: CT IMAGING | Facility: HOSPITAL | Age: 69
Discharge: HOME | End: 2024-12-02
Admitting: INTERNAL MEDICINE
Payer: MEDICARE

## 2024-12-02 VITALS
SYSTOLIC BLOOD PRESSURE: 136 MMHG | OXYGEN SATURATION: 100 % | HEIGHT: 65 IN | HEART RATE: 80 BPM | TEMPERATURE: 98.4 F | DIASTOLIC BLOOD PRESSURE: 86 MMHG | BODY MASS INDEX: 27.76 KG/M2 | WEIGHT: 166.6 LBS

## 2024-12-02 DIAGNOSIS — Z87.19 HISTORY OF DIVERTICULITIS: ICD-10-CM

## 2024-12-02 DIAGNOSIS — R07.9 CHEST PAIN IN ADULT: ICD-10-CM

## 2024-12-02 DIAGNOSIS — R10.32 LEFT LOWER QUADRANT PAIN: Primary | ICD-10-CM

## 2024-12-02 DIAGNOSIS — R10.32 LEFT LOWER QUADRANT PAIN: ICD-10-CM

## 2024-12-02 PROCEDURE — 3079F DIAST BP 80-89 MM HG: CPT | Performed by: INTERNAL MEDICINE

## 2024-12-02 PROCEDURE — 1159F MED LIST DOCD IN RCRD: CPT | Performed by: INTERNAL MEDICINE

## 2024-12-02 PROCEDURE — 1160F RVW MEDS BY RX/DR IN RCRD: CPT | Performed by: INTERNAL MEDICINE

## 2024-12-02 PROCEDURE — 99213 OFFICE O/P EST LOW 20 MIN: CPT | Performed by: INTERNAL MEDICINE

## 2024-12-02 PROCEDURE — 1125F AMNT PAIN NOTED PAIN PRSNT: CPT | Performed by: INTERNAL MEDICINE

## 2024-12-02 PROCEDURE — 74176 CT ABD & PELVIS W/O CONTRAST: CPT

## 2024-12-02 PROCEDURE — 93000 ELECTROCARDIOGRAM COMPLETE: CPT | Performed by: INTERNAL MEDICINE

## 2024-12-02 PROCEDURE — 3075F SYST BP GE 130 - 139MM HG: CPT | Performed by: INTERNAL MEDICINE

## 2024-12-02 NOTE — PROGRESS NOTES
"Subjective   Kelly Strickland is a 69 y.o. female.     History of Present Illness   LLQ pain for few weeks. Took Cipro and Flagyl. LLQ  has not resolved. Comes and goes. Bowel movements are normal. No vomiting. No fever. Nausea.   CP that does not radiate to arm, neck, or jaw for last few days.   The following portions of the patient's history were reviewed and updated as appropriate: allergies, current medications, past family history, past medical history, past social history, past surgical history, and problem list.    Review of Systems   Constitutional:  Negative for fatigue and fever.   HENT:  Negative for sinus pressure and sinus pain.    Respiratory:  Negative for cough and shortness of breath.    Cardiovascular:  Positive for chest pain. Negative for leg swelling.   Gastrointestinal:  Positive for abdominal pain and nausea. Negative for diarrhea and vomiting.   Psychiatric/Behavioral:  Negative for confusion.      /86 (BP Location: Left arm, Patient Position: Sitting)   Pulse 80   Temp 98.4 °F (36.9 °C) (Temporal)   Ht 165 cm (64.96\")   Wt 75.6 kg (166 lb 9.6 oz)   SpO2 100%   BMI 27.76 kg/m²     Objective   Physical Exam  Vitals reviewed.   Cardiovascular:      Rate and Rhythm: Normal rate and regular rhythm.   Pulmonary:      Effort: No respiratory distress.      Breath sounds: No wheezing.   Abdominal:      General: There is no distension.      Palpations: Abdomen is soft.      Tenderness: There is abdominal tenderness (LLQ). There is no guarding.   Neurological:      Mental Status: She is alert and oriented to person, place, and time.   Psychiatric:         Behavior: Behavior normal.         Assessment & Plan   Diagnoses and all orders for this visit:    1. Left lower quadrant pain (Primary)  -     CT Abdomen Pelvis Without Contrast; Future  Further recommendations pending CT  2. History of diverticulitis  -     CT Abdomen Pelvis Without Contrast; Future    3. Chest pain in adult  -     ECG " 12 Lead          ECG 12 Lead    Date/Time: 12/2/2024 1:42 PM  Performed by: Mary Antonio DO    Authorized by: Mary Antonio DO  Comparison: not compared with previous ECG   Rhythm: sinus rhythm  Rate: normal  ST Segments: ST segments normal  T Waves: T waves normal  QRS axis: normal    Clinical impression: normal ECG

## 2024-12-03 ENCOUNTER — TELEPHONE (OUTPATIENT)
Dept: INTERNAL MEDICINE | Facility: CLINIC | Age: 69
End: 2024-12-03
Payer: MEDICARE

## 2024-12-03 DIAGNOSIS — K57.92 ACUTE DIVERTICULITIS: Primary | ICD-10-CM

## 2024-12-03 DIAGNOSIS — K57.92 DIVERTICULITIS: Primary | ICD-10-CM

## 2024-12-03 RX ORDER — METRONIDAZOLE 500 MG/1
500 TABLET ORAL 3 TIMES DAILY
Qty: 30 TABLET | Refills: 0 | Status: SHIPPED | OUTPATIENT
Start: 2024-12-03

## 2024-12-03 RX ORDER — SULFAMETHOXAZOLE AND TRIMETHOPRIM 800; 160 MG/1; MG/1
1 TABLET ORAL 2 TIMES DAILY
Qty: 20 TABLET | Refills: 0 | Status: SHIPPED | OUTPATIENT
Start: 2024-12-03

## 2024-12-03 NOTE — TELEPHONE ENCOUNTER
Pt returned call. As pt is unable to tolerate Augmentin, (GI intolerance), pt is requesting another medication.   Pt is also requesting referral be made to [facility] instead of Dr. Cronin   Please advise.

## 2024-12-03 NOTE — TELEPHONE ENCOUNTER
Called and spoke to pt, gave message from provider. Pt voiced understanding and appreciation.     ----- Message from Mary Antonio sent at 12/3/2024  7:36 AM EST -----  Sent in new antibiotic augmentin tid x 10 days. Referred her back to Dr. Cronin for recurrent diverticulits. If symptoms worsen to ED

## 2025-02-02 ENCOUNTER — PATIENT ROUNDING (BHMG ONLY) (OUTPATIENT)
Dept: URGENT CARE | Facility: CLINIC | Age: 70
End: 2025-02-02
Payer: MEDICARE

## 2025-02-02 DIAGNOSIS — I10 BENIGN ESSENTIAL HYPERTENSION: ICD-10-CM

## 2025-02-03 RX ORDER — AMLODIPINE BESYLATE 2.5 MG/1
2.5 TABLET ORAL DAILY
Qty: 90 TABLET | Refills: 0 | Status: SHIPPED | OUTPATIENT
Start: 2025-02-03

## 2025-03-08 DIAGNOSIS — F41.9 ANXIETY: ICD-10-CM

## 2025-03-08 RX ORDER — SERTRALINE HYDROCHLORIDE 100 MG/1
100 TABLET, FILM COATED ORAL DAILY
Qty: 60 TABLET | Refills: 1 | Status: SHIPPED | OUTPATIENT
Start: 2025-03-08

## 2025-03-13 ENCOUNTER — TELEPHONE (OUTPATIENT)
Dept: INTERNAL MEDICINE | Facility: CLINIC | Age: 70
End: 2025-03-13

## 2025-03-13 NOTE — TELEPHONE ENCOUNTER
Caller: Kelly Strickland    Relationship: Self    Best call back number: [phone]     What medication are you requesting: TAMIFLU    What are your current symptoms: EXPOSED TO FLU B    How long have you been experiencing symptoms:         If a prescription is needed, what is your preferred pharmacy and phone number: [facility] [id number] - [address] [phone] [facility] [phone] FX     Additional notes:PATIENT WANTS A CALL BACK WHEN SENT IN

## 2025-03-13 NOTE — TELEPHONE ENCOUNTER
Spoke with patient and made her aware that Dr Antonio would not be able to [place]. She verbalized understanding. She will find an urgent care near her.

## 2025-04-21 ENCOUNTER — OFFICE VISIT (OUTPATIENT)
Dept: INTERNAL MEDICINE | Facility: CLINIC | Age: 70
End: 2025-04-21
Payer: MEDICARE

## 2025-04-21 VITALS
OXYGEN SATURATION: 98 % | TEMPERATURE: 98 F | SYSTOLIC BLOOD PRESSURE: 130 MMHG | BODY MASS INDEX: 29.33 KG/M2 | HEIGHT: 64 IN | WEIGHT: 171.8 LBS | DIASTOLIC BLOOD PRESSURE: 88 MMHG | HEART RATE: 73 BPM

## 2025-04-21 DIAGNOSIS — Z13.31 DEPRESSION SCREEN: ICD-10-CM

## 2025-04-21 DIAGNOSIS — K57.92 DIVERTICULITIS: Primary | ICD-10-CM

## 2025-04-21 PROCEDURE — 96127 BRIEF EMOTIONAL/BEHAV ASSMT: CPT | Performed by: NURSE PRACTITIONER

## 2025-04-21 PROCEDURE — 99213 OFFICE O/P EST LOW 20 MIN: CPT | Performed by: NURSE PRACTITIONER

## 2025-04-21 PROCEDURE — 3079F DIAST BP 80-89 MM HG: CPT | Performed by: NURSE PRACTITIONER

## 2025-04-21 PROCEDURE — 3075F SYST BP GE 130 - 139MM HG: CPT | Performed by: NURSE PRACTITIONER

## 2025-04-21 PROCEDURE — 1160F RVW MEDS BY RX/DR IN RCRD: CPT | Performed by: NURSE PRACTITIONER

## 2025-04-21 PROCEDURE — 1159F MED LIST DOCD IN RCRD: CPT | Performed by: NURSE PRACTITIONER

## 2025-04-21 PROCEDURE — 1125F AMNT PAIN NOTED PAIN PRSNT: CPT | Performed by: NURSE PRACTITIONER

## 2025-04-21 RX ORDER — SULFAMETHOXAZOLE AND TRIMETHOPRIM 800; 160 MG/1; MG/1
1 TABLET ORAL 2 TIMES DAILY
Qty: 20 TABLET | Refills: 0 | Status: SHIPPED | OUTPATIENT
Start: 2025-04-21 | End: 2025-05-01

## 2025-04-21 RX ORDER — METRONIDAZOLE 500 MG/1
500 TABLET ORAL 3 TIMES DAILY
Qty: 30 TABLET | Refills: 0 | Status: SHIPPED | OUTPATIENT
Start: 2025-04-21 | End: 2025-05-01

## 2025-04-21 NOTE — PROGRESS NOTES
Office Note     Name: Kelly Strickland    : 1955     MRN: [id number]     Chief Complaint  Diverticulitis (Patient having lower left abdominal pain. )    Subjective     History of Present Illness:  Kelly Strickland is a 70 y.o. female who presents today for abdominal pain.    Per chart review it does appear that she has been treated for diverticulitis via Dr. Antonio in 2024.  Medication included Bactrim and Flagyl.  Patient did request referral to Dr. Kailey López for recurrent diverticulitis. Patient did let me know that Dr. López is retired and no longer in practice. She will address and discuss referral to someone else with next visit with Dr. Antonio    Patient states she is currently having some lower abdominal pain especially the lower left side.  She feels she is in a diverticulitis flare.  She has had this happen a few times.  She has been doing at home remedies for the last few days without any significant success.  No fevers.  Patient stated that last treatment she was not able to tolerate the Augmentin but she was able to tolerate the regimen that was prescribed          Past Medical History:   Diagnosis Date    Acid reflux     Ankle sprain     Anxiety     Basal cell carcinoma of skin     Bursitis of hip     Colon polyp     Diverticulosis     Fracture of ankle     Fracture, foot     Heart murmur     Herpes zoster     Hip arthrosis     Humeral fracture     Hypertension     Migraine     Nephrolithiasis     Osteopenia     Tennis elbow     Thoracic vertebral fracture     Vitamin D deficiency        Past Surgical History:   Procedure Laterality Date    BREAST BIOPSY Left     CHOLECYSTECTOMY      COLONOSCOPY      CYSTOSCOPY W/ LASER LITHOTRIPSY      ENDOMETRIAL ABLATION      FRACTURE SURGERY  2024    T12    HERNIA REPAIR      MOHS SURGERY      ORIF HUMERAL SHAFT FRACTURE      DC OPTX PROX HUMERAL FX W/INT FIXJ RPR TUBEROSITY Right 2017    Procedure: OPEN REDUCTION INTERNAL  FIXATION RIGHT HUMERUS;  Surgeon: Ryan Jimenes MD;  Location: [facility];  Service: Orthopedics    SPINE SURGERY  Jan2024    T12    TONSILLECTOMY         Social History     Socioeconomic History    Marital status:    Tobacco Use    Smoking status: Never    Smokeless tobacco: Never   Vaping Use    Vaping status: Never Used   Substance and Sexual Activity    Alcohol use: Yes     Alcohol/week: 6.0 standard drinks of alcohol     Types: 6 Glasses of wine per week     Comment: occasionally    Drug use: No    Sexual activity: Yes     Partners: Male     Birth control/protection: Post-menopausal, None         Current Outpatient Medications:     amLODIPine (NORVASC) 2.5 MG tablet, TAKE 1 TABLET BY MOUTH DAILY, Disp: 90 tablet, Rfl: 0    buPROPion XL (WELLBUTRIN XL) 150 MG 24 hr tablet, TAKE 1 TABLET BY MOUTH DAILY, Disp: 90 tablet, Rfl: 3    Cholecalciferol (Vitamin D3) 50 MCG (2000 UT) capsule, , Disp: , Rfl:     multivitamin with minerals (Centrum Silver 50+Women) tablet tablet, , Disp: , Rfl:     sertraline (ZOLOFT) 100 MG tablet, TAKE 1 TABLET BY MOUTH DAILY, Disp: 60 tablet, Rfl: 1    triamcinolone (KENALOG) 0.1 % cream, APPLY TO HANDS TWICE DAILY FOR 2 WEEKS PER MONTH. THEN AS NEEDED FOR FLARES, Disp: , Rfl:     metroNIDAZOLE (FLAGYL) 500 MG tablet, Take 1 tablet by mouth 3 (Three) Times a Day for 10 days., Disp: 30 tablet, Rfl: 0    sulfamethoxazole-trimethoprim (BACTRIM DS,SEPTRA DS) 800-160 MG per tablet, Take 1 tablet by mouth 2 (Two) Times a Day for 10 days., Disp: 20 tablet, Rfl: 0  No current facility-administered medications for this visit.    Facility-Administered Medications Ordered in Other Visits:     bupivacaine PF (MARCAINE) 0.25 % injection, , , PRN, Naveen Ruvalcaba CRNA, 15 mL at 03/14/17 1200    buprenorphine (BUPRENEX) injection, , , PRN, Naveen Ruvalcaba CRNA, 0.3 mg at 03/14/17 1200    dexamethasone sodium phosphate injection, , Peripheral Nerve, PRN, Naveen Ruvalcaba CRNA, 2 mg at  "03/14/17 1200    Objective     Vital Signs  /88 (BP Location: Left arm, Patient Position: Sitting, Cuff Size: Adult)   Pulse 73   Temp 98 °F (36.7 °C)   Ht 162.6 cm (64\")   Wt 77.9 kg (171 lb 12.8 oz)   SpO2 98%   BMI 29.49 kg/m²   Estimated body mass index is 29.49 kg/m² as calculated from the following:    Height as of this encounter: 162.6 cm (64\").    Weight as of this encounter: 77.9 kg (171 lb 12.8 oz).            PHQ-9 Depression Screening  Little interest or pleasure in doing things? Not at all   Feeling down, depressed, or hopeless? Not at all   PHQ-2 Total Score 0   Trouble falling or staying asleep, or sleeping too much?     Feeling tired or having little energy?     Poor appetite or overeating?     Feeling bad about yourself - or that you are a failure or have let yourself or your family down?     Trouble concentrating on things, such as reading the newspaper or watching television?     Moving or speaking so slowly that other people could have noticed? Or the opposite - being so fidgety or restless that you have been moving around a lot more than usual?     Thoughts that you would be better off dead, or of hurting yourself in some way?     PHQ-9 Total Score     If you checked off any problems, how difficult have these problems made it for you to do your work, take care of things at home, or get along with other people? Not difficult at all     PHQ-9 Total Score:             Physical Exam  Vitals and nursing note reviewed.   Constitutional:       Appearance: Normal appearance.   HENT:      Head: Normocephalic and atraumatic.   Eyes:      Extraocular Movements: Extraocular movements intact.      Pupils: Pupils are equal, round, and reactive to light.   Cardiovascular:      Rate and Rhythm: Normal rate and regular rhythm.      Heart sounds: Normal heart sounds.   Pulmonary:      Effort: Pulmonary effort is normal.      Breath sounds: Normal breath sounds.   Abdominal:      General: Bowel sounds " are normal.      Palpations: Abdomen is soft.      Tenderness: There is abdominal tenderness in the left lower quadrant. There is no guarding or rebound.   Musculoskeletal:         General: Normal range of motion.   Skin:     General: Skin is warm and dry.   Neurological:      Mental Status: She is alert and oriented to person, place, and time.   Psychiatric:         Mood and Affect: Mood normal.         Behavior: Behavior normal.                   Assessment and Plan     Diagnoses and all orders for this visit:    1. Diverticulitis (Primary)  -     sulfamethoxazole-trimethoprim (BACTRIM DS,SEPTRA DS) 800-160 MG per tablet; Take 1 tablet by mouth 2 (Two) Times a Day for 10 days.  Dispense: 20 tablet; Refill: 0  -     metroNIDAZOLE (FLAGYL) 500 MG tablet; Take 1 tablet by mouth 3 (Three) Times a Day for 10 days.  Dispense: 30 tablet; Refill: 0    2. Depression screen    Plan  I appreciate the information noted about this provider retiring (Dr. López).  I will make sure to notify Dr. Antonio as well.  Patient will discuss new referral with Dr. Antonio at upcoming visit  Medication sent to pharmacy to assist with her current diverticulitis flare.  Depression screen with negative results  Continue with bland soft diet  Go to ER if any condition worsens or severe  Plan to follow-up as scheduled    Follow Up  Return for Next scheduled follow up.    WILFRID Rojas    Part of this note may be an electronic transcription/translation of spoken language to printed text using the Dragon Dictation System.

## 2025-05-08 RX ORDER — SERTRALINE HYDROCHLORIDE 100 MG/1
100 TABLET, FILM COATED ORAL DAILY
Qty: 90 TABLET | Refills: 0 | Status: CANCELLED | OUTPATIENT
Start: 2025-05-08

## 2025-05-08 RX ORDER — BUPROPION HYDROCHLORIDE 150 MG/1
150 TABLET ORAL DAILY
Qty: 90 TABLET | Refills: 0 | Status: CANCELLED | OUTPATIENT
Start: 2025-05-08

## 2025-05-08 RX ORDER — AMLODIPINE BESYLATE 2.5 MG/1
2.5 TABLET ORAL DAILY
Qty: 90 TABLET | Refills: 0 | Status: CANCELLED | OUTPATIENT
Start: 2025-05-08

## 2025-05-09 ENCOUNTER — OFFICE VISIT (OUTPATIENT)
Dept: INTERNAL MEDICINE | Facility: CLINIC | Age: 70
End: 2025-05-09
Payer: MEDICARE

## 2025-05-09 ENCOUNTER — LAB (OUTPATIENT)
Dept: LAB | Facility: HOSPITAL | Age: 70
End: 2025-05-09
Payer: MEDICARE

## 2025-05-09 VITALS
WEIGHT: 166.4 LBS | OXYGEN SATURATION: 99 % | DIASTOLIC BLOOD PRESSURE: 76 MMHG | HEIGHT: 64 IN | SYSTOLIC BLOOD PRESSURE: 120 MMHG | BODY MASS INDEX: 28.41 KG/M2 | TEMPERATURE: 97.6 F | HEART RATE: 78 BPM

## 2025-05-09 DIAGNOSIS — F41.9 ANXIETY: ICD-10-CM

## 2025-05-09 DIAGNOSIS — Z00.00 MEDICARE ANNUAL WELLNESS VISIT, SUBSEQUENT: Primary | ICD-10-CM

## 2025-05-09 DIAGNOSIS — I10 BENIGN ESSENTIAL HYPERTENSION: ICD-10-CM

## 2025-05-09 DIAGNOSIS — Z13.220 SCREENING CHOLESTEROL LEVEL: ICD-10-CM

## 2025-05-09 DIAGNOSIS — M79.605 LEFT LEG PAIN: Primary | ICD-10-CM

## 2025-05-09 DIAGNOSIS — Z13.29 THYROID DISORDER SCREEN: ICD-10-CM

## 2025-05-09 DIAGNOSIS — I83.92 VARICOSE VEINS OF LEFT LOWER EXTREMITY, UNSPECIFIED WHETHER COMPLICATED: ICD-10-CM

## 2025-05-09 DIAGNOSIS — M79.605 LEFT LEG PAIN: ICD-10-CM

## 2025-05-09 LAB
ALBUMIN SERPL-MCNC: 4 G/DL (ref 3.5–5.2)
ALBUMIN/GLOB SERPL: 1.5 G/DL
ALP SERPL-CCNC: 89 U/L (ref 39–117)
ALT SERPL W P-5'-P-CCNC: 18 U/L (ref 1–33)
ANION GAP SERPL CALCULATED.3IONS-SCNC: 10 MMOL/L (ref 5–15)
AST SERPL-CCNC: 20 U/L (ref 1–32)
BASOPHILS # BLD AUTO: 0.03 10*3/MM3 (ref 0–0.2)
BASOPHILS NFR BLD AUTO: 0.5 % (ref 0–1.5)
BILIRUB SERPL-MCNC: 0.3 MG/DL (ref 0–1.2)
BUN SERPL-MCNC: 12 MG/DL (ref 8–23)
BUN/CREAT SERPL: 15.4 (ref 7–25)
CALCIUM SPEC-SCNC: 9.1 MG/DL (ref 8.6–10.5)
CHLORIDE SERPL-SCNC: 106 MMOL/L (ref 98–107)
CHOLEST SERPL-MCNC: 189 MG/DL (ref 0–200)
CO2 SERPL-SCNC: 27 MMOL/L (ref 22–29)
CREAT SERPL-MCNC: 0.78 MG/DL (ref 0.57–1)
DEPRECATED RDW RBC AUTO: 43.2 FL (ref 37–54)
EGFRCR SERPLBLD CKD-EPI 2021: 81.8 ML/MIN/1.73
EOSINOPHIL # BLD AUTO: 0.18 10*3/MM3 (ref 0–0.4)
EOSINOPHIL NFR BLD AUTO: 3 % (ref 0.3–6.2)
ERYTHROCYTE [DISTWIDTH] IN BLOOD BY AUTOMATED COUNT: 12.4 % (ref 12.3–15.4)
GLOBULIN UR ELPH-MCNC: 2.6 GM/DL
GLUCOSE SERPL-MCNC: 104 MG/DL (ref 65–99)
HCT VFR BLD AUTO: 37.2 % (ref 34–46.6)
HDLC SERPL-MCNC: 59 MG/DL (ref 40–60)
HGB BLD-MCNC: 12.6 G/DL (ref 12–15.9)
IMM GRANULOCYTES # BLD AUTO: 0.02 10*3/MM3 (ref 0–0.05)
IMM GRANULOCYTES NFR BLD AUTO: 0.3 % (ref 0–0.5)
LDLC SERPL CALC-MCNC: 116 MG/DL (ref 0–100)
LDLC/HDLC SERPL: 1.95 {RATIO}
LYMPHOCYTES # BLD AUTO: 1.64 10*3/MM3 (ref 0.7–3.1)
LYMPHOCYTES NFR BLD AUTO: 27.5 % (ref 19.6–45.3)
MCH RBC QN AUTO: 32.3 PG (ref 26.6–33)
MCHC RBC AUTO-ENTMCNC: 33.9 G/DL (ref 31.5–35.7)
MCV RBC AUTO: 95.4 FL (ref 79–97)
MONOCYTES # BLD AUTO: 0.33 10*3/MM3 (ref 0.1–0.9)
MONOCYTES NFR BLD AUTO: 5.5 % (ref 5–12)
NEUTROPHILS NFR BLD AUTO: 3.77 10*3/MM3 (ref 1.7–7)
NEUTROPHILS NFR BLD AUTO: 63.2 % (ref 42.7–76)
NRBC BLD AUTO-RTO: 0 /100 WBC (ref 0–0.2)
PLATELET # BLD AUTO: 316 10*3/MM3 (ref 140–450)
PMV BLD AUTO: 9.2 FL (ref 6–12)
POTASSIUM SERPL-SCNC: 4.4 MMOL/L (ref 3.5–5.2)
PROT SERPL-MCNC: 6.6 G/DL (ref 6–8.5)
RBC # BLD AUTO: 3.9 10*6/MM3 (ref 3.77–5.28)
SODIUM SERPL-SCNC: 143 MMOL/L (ref 136–145)
TRIGL SERPL-MCNC: 75 MG/DL (ref 0–150)
TSH SERPL DL<=0.05 MIU/L-ACNC: 2.12 UIU/ML (ref 0.27–4.2)
VLDLC SERPL-MCNC: 14 MG/DL (ref 5–40)
WBC NRBC COR # BLD AUTO: 5.97 10*3/MM3 (ref 3.4–10.8)

## 2025-05-09 PROCEDURE — 80061 LIPID PANEL: CPT

## 2025-05-09 PROCEDURE — 84443 ASSAY THYROID STIM HORMONE: CPT

## 2025-05-09 PROCEDURE — 80053 COMPREHEN METABOLIC PANEL: CPT

## 2025-05-09 PROCEDURE — 85025 COMPLETE CBC W/AUTO DIFF WBC: CPT

## 2025-05-09 NOTE — ASSESSMENT & PLAN NOTE
Continue Norvasc    Orders:    CBC & Differential; Future    Comprehensive Metabolic Panel; Future

## 2025-05-09 NOTE — PROGRESS NOTES
Subjective   The ABCs of the Annual Wellness Visit  Medicare Wellness Visit  Chief Complaint   Patient presents with    Medicare Wellness-subsequent    Hypertension    Anxiety    Varicose Veins     Left leg         Kelly Strickland is a 70 y.o. patient who presents for a Medicare Wellness Visit.    The following portions of the patient's history were reviewed and   updated as appropriate: allergies, current medications, past family history, past medical history, past social history, past surgical history, and problem list.    Compared to one year ago, the patient's physical   health is the same.  Compared to one year ago, the patient's mental   health is the same.    Recent Hospitalizations:  She was not admitted to the hospital during the last year.     Current Medical Providers:  Patient Care Team:  Mary Antonio DO as PCP - General  Mary Antonio DO as Referring Physician (Internal Medicine)  Volodymyr Whelan MD as Consulting Physician (Neurosurgery)    Outpatient Medications Prior to Visit   Medication Sig Dispense Refill    amLODIPine (NORVASC) 2.5 MG tablet TAKE 1 TABLET BY MOUTH DAILY 90 tablet 0    buPROPion XL (WELLBUTRIN XL) 150 MG 24 hr tablet TAKE 1 TABLET BY MOUTH DAILY 90 tablet 3    Cholecalciferol (Vitamin D3) 50 MCG (2000 UT) capsule       multivitamin with minerals (Centrum Silver 50+Women) tablet tablet       sertraline (ZOLOFT) 100 MG tablet TAKE 1 TABLET BY MOUTH DAILY 60 tablet 1    triamcinolone (KENALOG) 0.1 % cream APPLY TO HANDS TWICE DAILY FOR 2 WEEKS PER MONTH. THEN AS NEEDED FOR FLARES       Facility-Administered Medications Prior to Visit   Medication Dose Route Frequency Provider Last Rate Last Admin    bupivacaine PF (MARCAINE) 0.25 % injection    PRN Naveen Ruvalcaba CRNA   15 mL at 03/14/17 1200    buprenorphine (BUPRENEX) injection    PRN Naveen Ruvalcaba CRNA   0.3 mg at 03/14/17 1200    dexamethasone sodium phosphate injection   Peripheral Nerve PRN Naveen Ruvalcaba  "M, CRNA   2 mg at 03/14/17 1200     Opioid medication/s are on active medication list.  and I have evaluated her active treatment plan and pain score trends (see table).  Vitals:    05/09/25 0955   PainSc: 1    PainLoc: Leg  Comment: vein in inner left leg     I have reviewed the chart for potential of high risk medication and harmful drug interactions in the elderly.        Aspirin is not on active medication list.  Aspirin use is not indicated based on review of current medical condition/s. Risk of harm outweighs potential benefits.  .    Patient Active Problem List   Diagnosis    Disorder of bilirubin excretion    Elevated blood-pressure reading without diagnosis of hypertension    Herpes zoster    Carotid bruit    GERD (gastroesophageal reflux disease)    Osteopenia    S/P ORIF right humeral shaft fracture     Diverticulitis of large intestine with perforation without abscess or bleeding    Anxiety    Benign essential hypertension    Diverticulitis of colon with perforation    Neutrophilic leukemoid reaction    Osteoporosis    Right flank pain    Vitamin D deficiency    Chest pain in adult     Advance Care Planning Advance Directive is not on file.  ACP discussion was held with the patient during this visit. Patient has an advance directive (not in EMR), copy requested.            Objective   Vitals:    05/09/25 0955   BP: 120/76   BP Location: Left arm   Patient Position: Sitting   Cuff Size: Adult   Pulse: 78   Temp: 97.6 °F (36.4 °C)   TempSrc: Temporal   SpO2: 99%   Weight: 75.5 kg (166 lb 6.4 oz)   Height: 161.5 cm (63.58\")   PainSc: 1    PainLoc: Leg  Comment: vein in inner left leg       Estimated body mass index is 28.94 kg/m² as calculated from the following:    Height as of this encounter: 161.5 cm (63.58\").    Weight as of this encounter: 75.5 kg (166 lb 6.4 oz).    BMI is >= 25 and <30. (Overweight) The following options were offered after discussion;: exercise counseling/recommendations       "     Does the patient have evidence of cognitive impairment? No                                                                                                Health  Risk Assessment    Smoking Status:  Social History     Tobacco Use   Smoking Status Never   Smokeless Tobacco Never     Alcohol Consumption:  Social History     Substance and Sexual Activity   Alcohol Use Yes    Alcohol/week: 6.0 standard drinks of alcohol    Types: 6 Glasses of wine per week    Comment: occasionally       Fall Risk Screen  JAVY Fall Risk Assessment was completed, and patient is at LOW risk for falls.Assessment completed on:2025    Depression Screening   Little interest or pleasure in doing things? Not at all   Feeling down, depressed, or hopeless? Not at all   PHQ-2 Total Score 0      Health Habits and Functional and Cognitive Screenin/9/2025     9:57 AM   Functional & Cognitive Status   Do you have difficulty preparing food and eating? No   Do you have difficulty bathing yourself, getting dressed or grooming yourself? No   Do you have difficulty using the toilet? No   Do you have difficulty moving around from place to place? No   Do you have trouble with steps or getting out of a bed or a chair? No   Current Diet Limited Junk Food   Dental Exam Up to date   Eye Exam Up to date   Exercise (times per week) 3 times per week   Current Exercises Include Walking   Do you need help using the phone?  No   Are you deaf or do you have serious difficulty hearing?  No   Do you need help to go to places out of walking distance? No   Do you need help shopping? No   Do you need help preparing meals?  No   Do you need help with housework?  No   Do you need help with laundry? No   Do you need help taking your medications? No   Do you need help managing money? No   Do you ever drive or ride in a car without wearing a seat belt? No   Have you felt unusual stress, anger or loneliness in the last month? No   Who do you live with? Spouse    If you need help, do you have trouble finding someone available to you? No   Have you been bothered in the last four weeks by sexual problems? No   Do you have difficulty concentrating, remembering or making decisions? No           Age-appropriate Screening Schedule:  Refer to the list below for future screening recommendations based on patient's age, sex and/or medical conditions. Orders for these recommended tests are listed in the plan section. The patient has been provided with a written plan.    Health Maintenance List  Health Maintenance   Topic Date Due    MAMMOGRAM  06/26/2025    COVID-19 Vaccine (3 - 2024-25 season) 05/23/2025 (Originally 9/1/2024)    TDAP/TD VACCINES (3 - Td or Tdap) 11/05/2025 (Originally 1/3/2025)    ZOSTER VACCINE (1 of 2) 11/05/2025 (Originally 2/19/2005)    INFLUENZA VACCINE  07/01/2025    ANNUAL WELLNESS VISIT  05/09/2026    DXA SCAN  04/08/2027    COLORECTAL CANCER SCREENING  12/28/2027    HEPATITIS C SCREENING  Completed    Pneumococcal Vaccine 50+  Completed                                                                                                                                                CMS Preventative Services Quick Reference  Risk Factors Identified During Encounter  None Identified    The above risks/problems have been discussed with the patient.  Pertinent information has been shared with the patient in the After Visit Summary.  An After Visit Summary and PPPS were made available to the patient.    Follow Up:   Next Medicare Wellness visit to be scheduled in 1 year.         Additional E&M Note during same encounter follows:  Patient has additional, significant, and separately identifiable condition(s)/problem(s) that require work above and beyond the Medicare Wellness Visit     Chief Complaint  Medicare Wellness-subsequent, Hypertension, Anxiety, and Varicose Veins (Left leg)    Subjective   HPI      Review of Systems   Constitutional:  Negative for fatigue and  "fever.   HENT:  Negative for sinus pressure, sneezing and sore throat.    Respiratory:  Negative for cough and shortness of breath.    Cardiovascular:  Negative for chest pain and leg swelling.   Gastrointestinal:  Negative for abdominal pain, diarrhea, nausea and vomiting.   Genitourinary:  Negative for urgency.   Musculoskeletal:         Leg pain   Neurological:  Negative for confusion.              Objective   Vital Signs:  /76 (BP Location: Left arm, Patient Position: Sitting, Cuff Size: Adult)   Pulse 78   Temp 97.6 °F (36.4 °C) (Temporal)   Ht 161.5 cm (63.58\")   Wt 75.5 kg (166 lb 6.4 oz)   SpO2 99%   BMI 28.94 kg/m²   Physical Exam  Vitals reviewed.   Cardiovascular:      Rate and Rhythm: Normal rate and regular rhythm.   Pulmonary:      Effort: No respiratory distress.      Breath sounds: No wheezing.   Musculoskeletal:         General: Tenderness (left inner thigh) present.      Comments: Varicose Vein Left inner thigh   Neurological:      Mental Status: She is alert and oriented to person, place, and time.                  Assessment and Plan      Anxiety  Continue Wellbutrin and Zoloft       Benign essential hypertension  Continue Norvasc    Orders:    CBC & Differential; Future    Comprehensive Metabolic Panel; Future    Medicare annual wellness visit, subsequent  Done today       Screening cholesterol level    Orders:    Lipid Panel; Future    Thyroid disorder screen    Orders:    TSH; Future    Left leg pain    Orders:    Duplex Venous Lower Extremity - Left CAR; Future    Varicose veins of left lower extremity, unspecified whether complicated  Awaiting doppler results     bg          Follow Up   No follow-ups on file.  Patient was given instructions and counseling regarding her condition or for health maintenance advice. Please see specific information pulled into the AVS if appropriate.    "

## 2025-05-11 DIAGNOSIS — I10 BENIGN ESSENTIAL HYPERTENSION: ICD-10-CM

## 2025-05-12 ENCOUNTER — HOSPITAL ENCOUNTER (OUTPATIENT)
Facility: HOSPITAL | Age: 70
Discharge: HOME | End: 2025-05-12
Admitting: INTERNAL MEDICINE
Payer: MEDICARE

## 2025-05-12 ENCOUNTER — RESULTS FOLLOW-UP (OUTPATIENT)
Dept: LAB | Facility: HOSPITAL | Age: 70
End: 2025-05-12
Payer: MEDICARE

## 2025-05-12 DIAGNOSIS — M79.605 LEFT LEG PAIN: ICD-10-CM

## 2025-05-12 LAB
BH CV LOW VAS LEFT GREATER SAPH AK VESSEL: 1
BH CV LOW VAS LEFT SAPHENOFEMORAL JUNCTION SPONT: 1
BH CV LOWER VASCULAR LEFT COMMON FEMORAL AUGMENT: NORMAL
BH CV LOWER VASCULAR LEFT COMMON FEMORAL COMPRESS: NORMAL
BH CV LOWER VASCULAR LEFT COMMON FEMORAL PHASIC: NORMAL
BH CV LOWER VASCULAR LEFT COMMON FEMORAL SPONT: NORMAL
BH CV LOWER VASCULAR LEFT DISTAL FEMORAL COMPRESS: NORMAL
BH CV LOWER VASCULAR LEFT GASTRONEMIUS COMPRESS: NORMAL
BH CV LOWER VASCULAR LEFT GREATER SAPH AK COMPRESS: NORMAL
BH CV LOWER VASCULAR LEFT GREATER SAPH BK COMPRESS: NORMAL
BH CV LOWER VASCULAR LEFT LESSER SAPH COMPRESS: NORMAL
BH CV LOWER VASCULAR LEFT MID FEMORAL AUGMENT: NORMAL
BH CV LOWER VASCULAR LEFT MID FEMORAL COMPRESS: NORMAL
BH CV LOWER VASCULAR LEFT MID FEMORAL PHASIC: NORMAL
BH CV LOWER VASCULAR LEFT MID FEMORAL SPONT: NORMAL
BH CV LOWER VASCULAR LEFT PERONEAL COMPRESS: NORMAL
BH CV LOWER VASCULAR LEFT POPLITEAL AUGMENT: NORMAL
BH CV LOWER VASCULAR LEFT POPLITEAL COMPRESS: NORMAL
BH CV LOWER VASCULAR LEFT POPLITEAL PHASIC: NORMAL
BH CV LOWER VASCULAR LEFT POPLITEAL SPONT: NORMAL
BH CV LOWER VASCULAR LEFT POSTERIOR TIBIAL COMPRESS: NORMAL
BH CV LOWER VASCULAR LEFT PROFUNDA FEMORAL COMPRESS: NORMAL
BH CV LOWER VASCULAR LEFT PROXIMAL FEMORAL COMPRESS: NORMAL
BH CV LOWER VASCULAR LEFT SAPHENOFEMORAL JUNCTION COMPRESS: NORMAL
BH CV LOWER VASCULAR RIGHT COMMON FEMORAL AUGMENT: NORMAL
BH CV LOWER VASCULAR RIGHT COMMON FEMORAL COMPRESS: NORMAL
BH CV LOWER VASCULAR RIGHT COMMON FEMORAL PHASIC: NORMAL
BH CV LOWER VASCULAR RIGHT COMMON FEMORAL SPONT: NORMAL
BH CV VAS PRELIMINARY FINDINGS SCRIPTING: 1

## 2025-05-12 PROCEDURE — 93971 EXTREMITY STUDY: CPT

## 2025-05-12 PROCEDURE — 93971 EXTREMITY STUDY: CPT | Performed by: INTERNAL MEDICINE

## 2025-05-12 RX ORDER — AMLODIPINE BESYLATE 2.5 MG/1
2.5 TABLET ORAL DAILY
Qty: 90 TABLET | Refills: 0 | Status: SHIPPED | OUTPATIENT
Start: 2025-05-12

## 2025-05-13 ENCOUNTER — RESULTS FOLLOW-UP (OUTPATIENT)
Dept: INTERNAL MEDICINE | Facility: CLINIC | Age: 70
End: 2025-05-13
Payer: MEDICARE

## 2025-05-13 DIAGNOSIS — I80.00 SUPERFICIAL THROMBOPHLEBITIS OF LOWER EXTREMITY, UNSPECIFIED LATERALITY: Primary | ICD-10-CM

## 2025-05-13 NOTE — TELEPHONE ENCOUNTER
Spoke with patient and relayed results note  Patient requested for the condition to be spelled out  Spelled condition and offered to send letter stating what the result note said. Patient declined offered letter   No further questions at this time

## 2025-06-25 RX ORDER — AMLODIPINE BESYLATE 2.5 MG/1
2.5 TABLET ORAL DAILY
Qty: 90 TABLET | Refills: 0 | Status: CANCELLED | OUTPATIENT
Start: 2025-06-25

## 2025-06-25 RX ORDER — SERTRALINE HYDROCHLORIDE 100 MG/1
100 TABLET, FILM COATED ORAL DAILY
Qty: 90 TABLET | Refills: 0 | Status: CANCELLED | OUTPATIENT
Start: 2025-06-25

## 2025-06-25 RX ORDER — BUPROPION HYDROCHLORIDE 150 MG/1
150 TABLET ORAL DAILY
Qty: 90 TABLET | Refills: 0 | Status: CANCELLED | OUTPATIENT
Start: 2025-06-25

## 2025-07-01 ENCOUNTER — OFFICE VISIT (OUTPATIENT)
Dept: INTERNAL MEDICINE | Facility: CLINIC | Age: 70
End: 2025-07-01
Payer: MEDICARE

## 2025-07-01 VITALS
HEIGHT: 64 IN | BODY MASS INDEX: 29.5 KG/M2 | SYSTOLIC BLOOD PRESSURE: 122 MMHG | RESPIRATION RATE: 12 BRPM | TEMPERATURE: 97.1 F | WEIGHT: 172.8 LBS | OXYGEN SATURATION: 97 % | HEART RATE: 74 BPM | DIASTOLIC BLOOD PRESSURE: 64 MMHG

## 2025-07-01 DIAGNOSIS — I80.00 SUPERFICIAL THROMBOPHLEBITIS OF LOWER EXTREMITY, UNSPECIFIED LATERALITY: ICD-10-CM

## 2025-07-01 PROCEDURE — 1160F RVW MEDS BY RX/DR IN RCRD: CPT | Performed by: INTERNAL MEDICINE

## 2025-07-01 PROCEDURE — 3078F DIAST BP <80 MM HG: CPT | Performed by: INTERNAL MEDICINE

## 2025-07-01 PROCEDURE — 1159F MED LIST DOCD IN RCRD: CPT | Performed by: INTERNAL MEDICINE

## 2025-07-01 PROCEDURE — 1126F AMNT PAIN NOTED NONE PRSNT: CPT | Performed by: INTERNAL MEDICINE

## 2025-07-01 PROCEDURE — 3074F SYST BP LT 130 MM HG: CPT | Performed by: INTERNAL MEDICINE

## 2025-07-01 PROCEDURE — 99212 OFFICE O/P EST SF 10 MIN: CPT | Performed by: INTERNAL MEDICINE

## 2025-07-01 NOTE — PROGRESS NOTES
"Subjective   Kelly Strickland is a 70 y.o. female.   Chief Complaint   Patient presents with    Med Management       History of Present Illness   Treated for superficial thrombophlebitis with xarelto in LLE.  LLE swelling nad pain has resolved.No CP or SOA>  The following portions of the patient's history were reviewed and updated as appropriate: allergies, current medications, past family history, past medical history, past social history, past surgical history, and problem list.    Review of Systems   Constitutional:  Negative for fatigue and fever.   Respiratory:  Negative for shortness of breath and stridor.    Cardiovascular:  Negative for leg swelling.     /64   Pulse 74   Temp 97.1 °F (36.2 °C) (Temporal)   Resp 12   Ht 161.5 cm (63.58\")   Wt 78.4 kg (172 lb 12.8 oz)   SpO2 97%   BMI 30.05 kg/m²     Objective   Physical Exam  Vitals reviewed.   Cardiovascular:      Rate and Rhythm: Normal rate and regular rhythm.   Pulmonary:      Effort: No respiratory distress.      Breath sounds: No wheezing.   Musculoskeletal:         General: No swelling.      Right lower leg: No edema.      Left lower leg: No edema.   Neurological:      Mental Status: She is alert.         Assessment & Plan   Diagnoses and all orders for this visit:    1. Superficial thrombophlebitis of lower extremity, unspecified laterality  Has resolved with xarelto. Completed treatment.                "

## 2025-07-17 DIAGNOSIS — F41.9 ANXIETY: ICD-10-CM

## 2025-07-17 RX ORDER — SERTRALINE HYDROCHLORIDE 100 MG/1
100 TABLET, FILM COATED ORAL DAILY
Qty: 90 TABLET | Refills: 0 | Status: SHIPPED | OUTPATIENT
Start: 2025-07-17

## 2025-08-08 DIAGNOSIS — I10 BENIGN ESSENTIAL HYPERTENSION: ICD-10-CM

## 2025-08-08 DIAGNOSIS — F41.9 ANXIETY: ICD-10-CM

## 2025-08-08 RX ORDER — AMLODIPINE BESYLATE 2.5 MG/1
2.5 TABLET ORAL DAILY
Qty: 90 TABLET | Refills: 0 | Status: SHIPPED | OUTPATIENT
Start: 2025-08-08

## 2025-08-08 RX ORDER — BUPROPION HYDROCHLORIDE 150 MG/1
150 TABLET ORAL DAILY
Qty: 90 TABLET | Refills: 0 | Status: SHIPPED | OUTPATIENT
Start: 2025-08-08

## 2025-08-12 ENCOUNTER — TRANSCRIBE ORDERS (OUTPATIENT)
Dept: ADMINISTRATIVE | Facility: HOSPITAL | Age: 70
End: 2025-08-12
Payer: MEDICARE

## 2025-08-12 DIAGNOSIS — Z12.31 SCREENING MAMMOGRAM FOR BREAST CANCER: Primary | ICD-10-CM

## 2025-08-13 PROCEDURE — 87070 CULTURE OTHR SPECIMN AEROBIC: CPT | Performed by: NURSE PRACTITIONER

## 2025-08-13 PROCEDURE — 87205 SMEAR GRAM STAIN: CPT | Performed by: NURSE PRACTITIONER

## (undated) DEVICE — KT PUMP PAIN ONQ CBLOC SELCTAFLO 400ML

## (undated) DEVICE — PK MAJ SHLDR SPLT 10

## (undated) DEVICE — GLV SURG SIGNATURE TOUCH PF LTX 8 STRL BX/50

## (undated) DEVICE — BIT DRL QC DIA 2.5X110MM

## (undated) DEVICE — SKIN AFFIX SURG ADHESIVE 72/CS 0.55ML: Brand: MEDLINE

## (undated) DEVICE — SOL NACL 0.9PCT 1000ML

## (undated) DEVICE — DRAPE,SHOULDER,BEACH CHAIR,STERILE: Brand: MEDLINE

## (undated) DEVICE — DRSNG SURG AQUACEL AG 9X25CM

## (undated) DEVICE — BIT DRL QC DIA 3.2X145MM

## (undated) DEVICE — BIT DRL QC DIA 3.5X110MM

## (undated) DEVICE — T-MAX DISPOSABLE FACE MASK 8 PER BOX

## (undated) DEVICE — SNAP KOVER: Brand: UNBRANDED

## (undated) DEVICE — GLV SURG TRIUMPH CLASSIC PF LTX 7.5 STRL

## (undated) DEVICE — CVR HNDL LT SURG ACCSSRY BLU STRL

## (undated) DEVICE — ANTIBACTERIAL UNDYED BRAIDED (POLYGLACTIN 910), SYNTHETIC ABSORBABLE SUTURE: Brand: COATED VICRYL

## (undated) DEVICE — SUT MONOCRYL PLS ANTIB UND 3/0  PS1 27IN